# Patient Record
Sex: FEMALE | Race: WHITE | Employment: OTHER | URBAN - METROPOLITAN AREA
[De-identification: names, ages, dates, MRNs, and addresses within clinical notes are randomized per-mention and may not be internally consistent; named-entity substitution may affect disease eponyms.]

---

## 2019-10-15 ENCOUNTER — HOSPITAL ENCOUNTER (OUTPATIENT)
Dept: GENERAL RADIOLOGY | Age: 49
Discharge: HOME | End: 2019-10-15
Payer: COMMERCIAL

## 2019-10-15 ENCOUNTER — HOSPITAL ENCOUNTER (OUTPATIENT)
Age: 49
Discharge: HOME | End: 2019-10-15
Payer: COMMERCIAL

## 2019-10-15 DIAGNOSIS — S62.308A: ICD-10-CM

## 2019-10-15 DIAGNOSIS — J01.80 OTHER ACUTE SINUSITIS, RECURRENCE NOT SPECIFIED: ICD-10-CM

## 2019-10-15 DIAGNOSIS — S62.306P: ICD-10-CM

## 2019-10-15 DIAGNOSIS — J40 BRONCHITIS: ICD-10-CM

## 2019-10-15 PROCEDURE — 70220 X-RAY EXAM OF SINUSES: CPT

## 2019-10-15 PROCEDURE — 73130 X-RAY EXAM OF HAND: CPT

## 2019-10-15 PROCEDURE — 71046 X-RAY EXAM CHEST 2 VIEWS: CPT

## 2021-10-15 LAB — MAMMOGRAPHY, EXTERNAL: NORMAL

## 2022-07-21 ENCOUNTER — OFFICE VISIT (OUTPATIENT)
Dept: FAMILY MEDICINE CLINIC | Age: 52
End: 2022-07-21
Payer: OTHER GOVERNMENT

## 2022-07-21 VITALS
TEMPERATURE: 97.6 F | BODY MASS INDEX: 25.61 KG/M2 | HEART RATE: 74 BPM | HEIGHT: 68 IN | DIASTOLIC BLOOD PRESSURE: 82 MMHG | OXYGEN SATURATION: 96 % | WEIGHT: 169 LBS | SYSTOLIC BLOOD PRESSURE: 124 MMHG

## 2022-07-21 DIAGNOSIS — R12 HEARTBURN: ICD-10-CM

## 2022-07-21 DIAGNOSIS — F41.9 ANXIETY: Primary | ICD-10-CM

## 2022-07-21 DIAGNOSIS — G47.30 SLEEP APNEA, UNSPECIFIED TYPE: ICD-10-CM

## 2022-07-21 DIAGNOSIS — E78.5 HYPERLIPIDEMIA, UNSPECIFIED HYPERLIPIDEMIA TYPE: ICD-10-CM

## 2022-07-21 DIAGNOSIS — I10 HYPERTENSION, UNSPECIFIED TYPE: ICD-10-CM

## 2022-07-21 PROCEDURE — 99203 OFFICE O/P NEW LOW 30 MIN: CPT | Performed by: FAMILY MEDICINE

## 2022-07-21 RX ORDER — OMEPRAZOLE 20 MG/1
20 CAPSULE, DELAYED RELEASE ORAL DAILY
COMMUNITY
End: 2022-07-21 | Stop reason: SDUPTHER

## 2022-07-21 RX ORDER — OMEPRAZOLE 20 MG/1
40 CAPSULE, DELAYED RELEASE ORAL
COMMUNITY
Start: 2021-10-15

## 2022-07-21 RX ORDER — BUSPIRONE HYDROCHLORIDE 10 MG/1
10 TABLET ORAL
COMMUNITY
Start: 2022-07-12

## 2022-07-21 RX ORDER — HYDROCHLOROTHIAZIDE 12.5 MG/1
12.5 TABLET ORAL DAILY
COMMUNITY
End: 2022-07-21 | Stop reason: SDUPTHER

## 2022-07-21 RX ORDER — ATORVASTATIN CALCIUM 10 MG/1
10 TABLET, FILM COATED ORAL DAILY
COMMUNITY

## 2022-07-21 RX ORDER — BUSPIRONE HYDROCHLORIDE 5 MG/1
5 TABLET ORAL DAILY
COMMUNITY
End: 2022-07-21 | Stop reason: SDUPTHER

## 2022-07-21 RX ORDER — TELMISARTAN 40 MG/1
40 TABLET ORAL DAILY
COMMUNITY

## 2022-07-21 RX ORDER — HYDROCHLOROTHIAZIDE 12.5 MG/1
12.5 TABLET ORAL
COMMUNITY
Start: 2022-02-10

## 2022-07-21 SDOH — ECONOMIC STABILITY: FOOD INSECURITY: WITHIN THE PAST 12 MONTHS, THE FOOD YOU BOUGHT JUST DIDN'T LAST AND YOU DIDN'T HAVE MONEY TO GET MORE.: NEVER TRUE

## 2022-07-21 SDOH — ECONOMIC STABILITY: FOOD INSECURITY: WITHIN THE PAST 12 MONTHS, YOU WORRIED THAT YOUR FOOD WOULD RUN OUT BEFORE YOU GOT MONEY TO BUY MORE.: NEVER TRUE

## 2022-07-21 ASSESSMENT — PATIENT HEALTH QUESTIONNAIRE - PHQ9
1. LITTLE INTEREST OR PLEASURE IN DOING THINGS: 0
SUM OF ALL RESPONSES TO PHQ QUESTIONS 1-9: 0
2. FEELING DOWN, DEPRESSED OR HOPELESS: 0
SUM OF ALL RESPONSES TO PHQ QUESTIONS 1-9: 0
SUM OF ALL RESPONSES TO PHQ9 QUESTIONS 1 & 2: 0
SUM OF ALL RESPONSES TO PHQ QUESTIONS 1-9: 0
1. LITTLE INTEREST OR PLEASURE IN DOING THINGS: 0
SUM OF ALL RESPONSES TO PHQ QUESTIONS 1-9: 0

## 2022-07-21 ASSESSMENT — SOCIAL DETERMINANTS OF HEALTH (SDOH): HOW HARD IS IT FOR YOU TO PAY FOR THE VERY BASICS LIKE FOOD, HOUSING, MEDICAL CARE, AND HEATING?: NOT HARD AT ALL

## 2022-07-21 NOTE — PROGRESS NOTES
Chief Complaint   Patient presents with    New Patient     Does go to [place] but would like to have another doctor. HPI:  Brock López is a 46 y.o. female     Establish PCP outside of [place] system    Only on a few meds    Had bloodwork in April   Scheduled to go back in September     Just started buspar this past week     Reports she is feeling well without complaints    Patient Active Problem List   Diagnosis    Anxiety    Heartburn    Hyperlipidemia    Hypertension    Sleep apnea         Current Outpatient Medications   Medication Sig Dispense Refill    telmisartan (MICARDIS) 40 MG tablet Take 40 mg by mouth in the morning. atorvastatin (LIPITOR) 10 MG tablet Take 10 mg by mouth in the morning. busPIRone (BUSPAR) 10 MG tablet Take 10 mg by mouth      hydroCHLOROthiazide (HYDRODIURIL) 12.5 MG tablet Take 12.5 mg by mouth      omeprazole (PRILOSEC) 20 MG delayed release capsule Take 40 mg by mouth       No current facility-administered medications for this visit. Past Medical History:   Diagnosis Date    Anxiety     Heartburn     Hyperlipidemia     Hypertension     Sleep apnea      Past Surgical History:   Procedure Laterality Date    HERNIA REPAIR Right     TOTAL VAGINAL HYSTERECTOMY  12/09/2007     Family History   Problem Relation Age of Onset    Hypertension Mother     Other Father     Cancer Father     Diabetes Father     Cancer Maternal Grandmother     Cancer Paternal Grandmother      Social History     Socioeconomic History    Marital status: Single     Spouse name: None    Number of children: None    Years of education: None    Highest education level: None   Tobacco Use    Smoking status: Every Day     Packs/day: 0.50     Types: Cigarettes     Start date: 24    Smokeless tobacco: Never   Substance and Sexual Activity    Alcohol use:  Yes     Alcohol/week: 4.0 standard drinks     Types: 4 Shots of liquor per week    Drug use: Never     Social Determinants of Health     Financial Resource Strain: Low Risk     Difficulty of Paying Living Expenses: Not hard at all   Food Insecurity: No Food Insecurity    Worried About Running Out of Food in the Last Year: Never true    Ran Out of Food in the Last Year: Never true     No Known Allergies    Review of Systems:   General ROS: negative for - chills, fatigue, fever, malaise, weight gain or weight loss  Respiratory ROS: no cough, shortness of breath, or wheezing  Cardiovascular ROS: no chest pain or dyspnea on exertion  Gastrointestinal ROS: no abdominal pain, change in bowel habits, or black or bloody stools  Genito-Urinary ROS: no dysuria, trouble voiding  Musculoskeletal ROS: back pain/hip pain   Neurological ROS: negative for - behavioral changes, memory loss, numbness/tingling, tremors or weakness    In general patient otherwise reports feeling well. Physical Exam:  /82 (Site: Left Upper Arm, Position: Sitting, Cuff Size: Medium Adult)   Pulse 74   Temp 97.6 °F (36.4 °C) (Tympanic)   Ht 5' 8\" (1.727 m)   Wt 169 lb (76.7 kg)   LMP 12/09/2007 (Exact Date)   SpO2 96%   Breastfeeding No   BMI 25.70 kg/m²     Gen: Well, NAD, Alert, Oriented x 3   HEENT: EOMI, eyes clear, MMM  Skin: without rash or jaundice  Neck: no significant lymphadenopathy or thyromegaly  Lungs: CTA B w/out Rales/Wheezes/Rhonchi, Good respiratory effort   Heart: RRR, S1S2, w/out M/R/G, non-displaced PMI   Ext: No C/C/E Bilaterally. Neuro: Neurovascularly intact w/ Sensory/Motor intact UE/LE Bilaterally. No results found for: WBC, HGB, HCT, PLT, CHOL, TRIG, HDL, LDLDIRECT, ALT, AST, NA, K, CL, CREATININE, BUN, CO2, TSH, PSA, INR, GLUF, LABA1C, LABMICR      A&P   Diagnosis Orders   1. Anxiety        2. Heartburn        3. Hyperlipidemia, unspecified hyperlipidemia type        4. Hypertension, unspecified type        5. Sleep apnea, unspecified type            Healthy diet and exercise  Reports bad hips and bad back     Labs through [facility] through [employer].

## 2022-08-17 ENCOUNTER — HOSPITAL ENCOUNTER (EMERGENCY)
Age: 52
Discharge: HOME | End: 2022-08-17
Attending: EMERGENCY MEDICINE
Payer: OTHER GOVERNMENT

## 2022-08-17 ENCOUNTER — APPOINTMENT (OUTPATIENT)
Dept: CT IMAGING | Age: 52
End: 2022-08-17
Payer: OTHER GOVERNMENT

## 2022-08-17 VITALS
WEIGHT: 172 LBS | HEIGHT: 68 IN | HEART RATE: 67 BPM | BODY MASS INDEX: 26.07 KG/M2 | SYSTOLIC BLOOD PRESSURE: 157 MMHG | TEMPERATURE: 98.5 F | RESPIRATION RATE: 18 BRPM | OXYGEN SATURATION: 96 % | DIASTOLIC BLOOD PRESSURE: 99 MMHG

## 2022-08-17 DIAGNOSIS — R11.2 NON-INTRACTABLE VOMITING WITH NAUSEA, UNSPECIFIED VOMITING TYPE: Primary | ICD-10-CM

## 2022-08-17 LAB
ALBUMIN SERPL-MCNC: 4.7 G/DL (ref 3.5–4.6)
ALP BLD-CCNC: 110 U/L (ref 40–130)
ALT SERPL-CCNC: 131 U/L (ref 0–33)
ANION GAP SERPL CALCULATED.3IONS-SCNC: 27 MEQ/L (ref 9–15)
AST SERPL-CCNC: 167 U/L (ref 0–35)
BASOPHILS ABSOLUTE: 0 K/UL (ref 0–0.2)
BASOPHILS RELATIVE PERCENT: 1.1 %
BILIRUB SERPL-MCNC: 2.3 MG/DL (ref 0.2–0.7)
BUN BLDV-MCNC: 8 MG/DL (ref 6–20)
CALCIUM SERPL-MCNC: 9.8 MG/DL (ref 8.5–9.9)
CHLORIDE BLD-SCNC: 87 MEQ/L (ref 95–107)
CO2: 21 MEQ/L (ref 20–31)
CREAT SERPL-MCNC: 0.56 MG/DL (ref 0.5–0.9)
EOSINOPHILS ABSOLUTE: 0 K/UL (ref 0–0.7)
EOSINOPHILS RELATIVE PERCENT: 0.1 %
GFR AFRICAN AMERICAN: >60
GFR NON-AFRICAN AMERICAN: >60
GLOBULIN: 2.5 G/DL (ref 2.3–3.5)
GLUCOSE BLD-MCNC: 102 MG/DL (ref 70–99)
HCT VFR BLD CALC: 41.2 % (ref 37–47)
HEMOGLOBIN: 14.4 G/DL (ref 12–16)
LYMPHOCYTES ABSOLUTE: 1.1 K/UL (ref 1–4.8)
LYMPHOCYTES RELATIVE PERCENT: 24.5 %
MCH RBC QN AUTO: 34.9 PG (ref 27–31.3)
MCHC RBC AUTO-ENTMCNC: 34.8 % (ref 33–37)
MCV RBC AUTO: 100.1 FL (ref 82–100)
MONOCYTES ABSOLUTE: 0.5 K/UL (ref 0.2–0.8)
MONOCYTES RELATIVE PERCENT: 11.2 %
NEUTROPHILS ABSOLUTE: 2.8 K/UL (ref 1.4–6.5)
NEUTROPHILS RELATIVE PERCENT: 63.1 %
PDW BLD-RTO: 13.9 % (ref 11.5–14.5)
PLATELET # BLD: 157 K/UL (ref 130–400)
POTASSIUM SERPL-SCNC: 3.9 MEQ/L (ref 3.4–4.9)
RBC # BLD: 4.12 M/UL (ref 4.2–5.4)
SODIUM BLD-SCNC: 135 MEQ/L (ref 135–144)
TOTAL PROTEIN: 7.2 G/DL (ref 6.3–8)
WBC # BLD: 4.5 K/UL (ref 4.8–10.8)

## 2022-08-17 PROCEDURE — 99285 EMERGENCY DEPT VISIT HI MDM: CPT

## 2022-08-17 PROCEDURE — 36415 COLL VENOUS BLD VENIPUNCTURE: CPT

## 2022-08-17 PROCEDURE — 96374 THER/PROPH/DIAG INJ IV PUSH: CPT

## 2022-08-17 PROCEDURE — 2580000003 HC RX 258: Performed by: EMERGENCY MEDICINE

## 2022-08-17 PROCEDURE — 6360000004 HC RX CONTRAST MEDICATION: Performed by: EMERGENCY MEDICINE

## 2022-08-17 PROCEDURE — 85025 COMPLETE CBC W/AUTO DIFF WBC: CPT

## 2022-08-17 PROCEDURE — 96375 TX/PRO/DX INJ NEW DRUG ADDON: CPT

## 2022-08-17 PROCEDURE — 6370000000 HC RX 637 (ALT 250 FOR IP): Performed by: EMERGENCY MEDICINE

## 2022-08-17 PROCEDURE — 74177 CT ABD & PELVIS W/CONTRAST: CPT

## 2022-08-17 PROCEDURE — 80053 COMPREHEN METABOLIC PANEL: CPT

## 2022-08-17 PROCEDURE — 96376 TX/PRO/DX INJ SAME DRUG ADON: CPT

## 2022-08-17 PROCEDURE — 6360000002 HC RX W HCPCS: Performed by: EMERGENCY MEDICINE

## 2022-08-17 RX ORDER — ONDANSETRON 2 MG/ML
4 INJECTION INTRAMUSCULAR; INTRAVENOUS ONCE
Status: COMPLETED | OUTPATIENT
Start: 2022-08-17 | End: 2022-08-17

## 2022-08-17 RX ORDER — ONDANSETRON 4 MG/1
4 TABLET, ORALLY DISINTEGRATING ORAL EVERY 8 HOURS PRN
Qty: 15 TABLET | Refills: 0 | Status: SHIPPED | OUTPATIENT
Start: 2022-08-17

## 2022-08-17 RX ORDER — ONDANSETRON 4 MG/1
4 TABLET, ORALLY DISINTEGRATING ORAL ONCE
Status: COMPLETED | OUTPATIENT
Start: 2022-08-17 | End: 2022-08-17

## 2022-08-17 RX ORDER — DIAZEPAM 5 MG/ML
5 INJECTION, SOLUTION INTRAMUSCULAR; INTRAVENOUS ONCE
Status: COMPLETED | OUTPATIENT
Start: 2022-08-17 | End: 2022-08-17

## 2022-08-17 RX ORDER — 0.9 % SODIUM CHLORIDE 0.9 %
1000 INTRAVENOUS SOLUTION INTRAVENOUS ONCE
Status: COMPLETED | OUTPATIENT
Start: 2022-08-17 | End: 2022-08-17

## 2022-08-17 RX ORDER — HYDROXYZINE 50 MG/1
50 TABLET, FILM COATED ORAL 2 TIMES DAILY PRN
Qty: 30 TABLET | Refills: 1 | Status: SHIPPED | OUTPATIENT
Start: 2022-08-17 | End: 2022-09-01

## 2022-08-17 RX ADMIN — IOPAMIDOL 50 ML: 612 INJECTION, SOLUTION INTRAVENOUS at 20:38

## 2022-08-17 RX ADMIN — DIAZEPAM 5 MG: 5 INJECTION, SOLUTION INTRAMUSCULAR; INTRAVENOUS at 19:09

## 2022-08-17 RX ADMIN — SODIUM CHLORIDE 1000 ML: 9 INJECTION, SOLUTION INTRAVENOUS at 19:08

## 2022-08-17 RX ADMIN — ONDANSETRON 4 MG: 2 INJECTION INTRAMUSCULAR; INTRAVENOUS at 19:09

## 2022-08-17 RX ADMIN — ONDANSETRON 4 MG: 2 INJECTION INTRAMUSCULAR; INTRAVENOUS at 21:23

## 2022-08-17 RX ADMIN — ONDANSETRON 4 MG: 4 TABLET, ORALLY DISINTEGRATING ORAL at 21:23

## 2022-08-17 ASSESSMENT — ENCOUNTER SYMPTOMS
EYE DISCHARGE: 0
WHEEZING: 0
ABDOMINAL DISTENTION: 0
SORE THROAT: 0
VOMITING: 1
CHEST TIGHTNESS: 0
ABDOMINAL PAIN: 0
NAUSEA: 1
PHOTOPHOBIA: 0
COUGH: 0
SHORTNESS OF BREATH: 0

## 2022-08-17 ASSESSMENT — PAIN - FUNCTIONAL ASSESSMENT
PAIN_FUNCTIONAL_ASSESSMENT: NONE - DENIES PAIN

## 2022-08-17 NOTE — ED PROVIDER NOTES
[facility] ED  eMERGENCY dEPARTMENT eNCOUnter      Pt Name: Jerrod Powers  MRN: [id number]  [place] 1970  Date of evaluation: 8/17/2022  Provider: Bo Alas MD    CHIEF COMPLAINT       Chief Complaint   Patient presents with    Nausea     Times 3 weeks         HISTORY OF PRESENT ILLNESS   (Location/Symptom, Timing/Onset,Context/Setting, Quality, Duration, Modifying Factors, Severity)  Note limiting factors. Jerrod Powers is a 46 y.o. female who presents to the emergency department for evaluation of nausea. Patient's had persistent nausea with vomiting for the past 3 weeks. She states its become more severe in the last 3 days where she feels like she cannot even keep anything down. No related abdominal pain. No diarrhea. No chest pain or shortness of breath. She relates the symptoms to her anxiety medication. Initially on Zoloft and then Wellbutrin and then now more recently BuSpar. She feels like she tolerates the medication for a couple weeks and then it begins to cause nausea. He has not taken her medication last 3 days because the vomiting is been severe. No real change since discontinuing her medication. HPI    NursingNotes were reviewed. REVIEW OF SYSTEMS    (2-9 systems for level 4, 10 or more for level 5)     Review of Systems   Constitutional:  Negative for chills and diaphoresis. HENT:  Negative for congestion, ear pain, mouth sores and sore throat. Eyes:  Negative for photophobia and discharge. Respiratory:  Negative for cough, chest tightness, shortness of breath and wheezing. Cardiovascular:  Negative for chest pain and palpitations. Gastrointestinal:  Positive for nausea and vomiting. Negative for abdominal distention and abdominal pain. Endocrine: Negative for cold intolerance. Genitourinary:  Negative for difficulty urinating. Musculoskeletal:  Negative for arthralgias. Skin:  Negative for pallor and rash.    Allergic/Immunologic: Negative for immunocompromised state. Neurological:  Negative for dizziness and syncope. Hematological:  Negative for adenopathy. Psychiatric/Behavioral:  Negative for agitation and hallucinations. All other systems reviewed and are negative. Except as noted above the remainder of the review of systems was reviewed and negative. PAST MEDICAL HISTORY     Past Medical History:   Diagnosis Date    Anxiety     Heartburn     Hyperlipidemia     Hypertension     Sleep apnea          SURGICALHISTORY       Past Surgical History:   Procedure Laterality Date    HERNIA REPAIR Right     TOTAL VAGINAL HYSTERECTOMY  12/09/2007         CURRENT MEDICATIONS       Previous Medications    ATORVASTATIN (LIPITOR) 10 MG TABLET    Take 10 mg by mouth in the morning. BUSPIRONE (BUSPAR) 10 MG TABLET    Take 10 mg by mouth    HYDROCHLOROTHIAZIDE (HYDRODIURIL) 12.5 MG TABLET    Take 12.5 mg by mouth    OMEPRAZOLE (PRILOSEC) 20 MG DELAYED RELEASE CAPSULE    Take 40 mg by mouth    TELMISARTAN (MICARDIS) 40 MG TABLET    Take 40 mg by mouth in the morning. ALLERGIES     Patient has no known allergies. FAMILY HISTORY       Family History   Problem Relation Age of Onset    Hypertension Mother     Other Father     Cancer Father     Diabetes Father     Cancer Maternal Grandmother     Cancer Paternal Grandmother           SOCIAL HISTORY       Social History     Socioeconomic History    Marital status: Single     Spouse name: None    Number of children: None    Years of education: None    Highest education level: None   Tobacco Use    Smoking status: Every Day     Packs/day: 0.50     Types: Cigarettes     Start date: 24    Smokeless tobacco: Never   Substance and Sexual Activity    Alcohol use:  Yes     Alcohol/week: 4.0 standard drinks     Types: 4 Shots of liquor per week    Drug use: Never     Social Determinants of Health     Financial Resource Strain: Low Risk     Difficulty of Paying Living Expenses: Not hard at all   Food Insecurity: No Food Insecurity    Worried About Running Out of Food in the Last Year: Never true    Ran Out of Food in the Last Year: Never true       SCREENINGS    [place] Coma Scale  Eye Opening: Spontaneous  Best Verbal Response: Oriented  Best Motor Response: Obeys commands  [place] Coma Scale Score: 15 @FLOW([id number])@      PHYSICAL EXAM    (up to 7 for level 4, 8 or more for level 5)     ED Triage Vitals [08/17/22 1811]   BP Temp Temp Source Heart Rate Resp SpO2 Height Weight   (!) 130/94 98.5 °F (36.9 °C) Oral 97 18 96 % 5' 8\" (1.727 m) 172 lb (78 kg)       Physical Exam  Vitals and nursing note reviewed. Constitutional:       Appearance: She is well-developed. HENT:      Head: Normocephalic. Nose: Nose normal.   Eyes:      Conjunctiva/sclera: Conjunctivae normal.      Pupils: Pupils are equal, round, and reactive to light. Cardiovascular:      Rate and Rhythm: Normal rate and regular rhythm. Heart sounds: Normal heart sounds. Pulmonary:      Effort: Pulmonary effort is normal.      Breath sounds: Normal breath sounds. Abdominal:      General: Bowel sounds are normal.      Palpations: Abdomen is soft. Tenderness: There is no abdominal tenderness. There is no guarding. Musculoskeletal:         General: Normal range of motion. Cervical back: Normal range of motion and neck supple. Skin:     General: Skin is warm and dry. Capillary Refill: Capillary refill takes less than 2 seconds. Neurological:      Mental Status: She is alert and oriented to person, place, and time.    Psychiatric:         Mood and Affect: Mood normal.       DIAGNOSTIC RESULTS     EKG: All EKG's are interpreted by the Emergency Department Physician who either signs or Co-signsthis chart in the absence of a cardiologist.        RADIOLOGY:   Non-plain filmimages such as CT, Ultrasound and MRI are read by the radiologist. Plain radiographic images are visualized and preliminarily interpreted by the emergency physician with the below findings:    Interpretation per the Radiologist below, if available at the time ofthis note:    CT ABDOMEN PELVIS W IV CONTRAST Additional Contrast? None    (Results Pending)         ED BEDSIDE ULTRASOUND:   Performed by ED Physician - none    LABS:  Labs Reviewed   COMPREHENSIVE METABOLIC PANEL - Abnormal; Notable for the following components:       Result Value    Chloride 87 (*)     Anion Gap 27 (*)     Glucose 102 (*)     Albumin 4.7 (*)     Total Bilirubin 2.3 (*)      (*)      (*)     All other components within normal limits   CBC WITH AUTO DIFFERENTIAL - Abnormal; Notable for the following components:    WBC 4.5 (*)     RBC 4.12 (*)     .1 (*)     MCH 34.9 (*)     All other components within normal limits       All other labs were within normal range or not returned as of this dictation. EMERGENCY DEPARTMENT COURSE and DIFFERENTIAL DIAGNOSIS/MDM:   Vitals:    Vitals:    08/17/22 1811 08/17/22 2045   BP: (!) 130/94 (!) 157/99   Pulse: 97 67   Resp: 18 18   Temp: 98.5 °F (36.9 °C)    TempSrc: Oral    SpO2: 96% 96%   Weight: 172 lb (78 kg)    Height: 5' 8\" (1.727 m)           MDM  On recheck patient's nausea is mostly improved. She did have elevated liver functions and bilirubin so CT was performed. ET shows no signs of gallbladder or pancreas disease. She does have an adrenal gland cyst that was actually evaluated by MRI in the past few months. Considered benign. Already discontinued the BuSpar. I Jose Comes put her on Atarax as needed till she follows up with her primary care physician. CONSULTS:  None    PROCEDURES:  Unless otherwise noted below, none     Procedures    FINAL IMPRESSION      1.  Non-intractable vomiting with nausea, unspecified vomiting type          DISPOSITION/PLAN   DISPOSITION Discharge - Pending Orders Complete 08/17/2022 09:18:57 PM      PATIENT REFERRED TO:  MD [facility], [address] [place] [address], [phone]  [phone]          DISCHARGE MEDICATIONS:  New Prescriptions    HYDROXYZINE HCL (ATARAX) 50 MG TABLET    Take 1 tablet by mouth 2 times daily as needed for Itching or Anxiety May sub Vistaril.     ONDANSETRON (ZOFRAN ODT) 4 MG DISINTEGRATING TABLET    Take 1 tablet by mouth every 8 hours as needed for Nausea          (Please note that portions of this note were completed with a voice recognition program.  Efforts were made to edit the dictations but occasionally words are mis-transcribed.)    Kalen Cardenas MD (electronically signed)  Attending Emergency Physician          Kalen Cardenas MD  08/17/22 4841

## 2022-08-17 NOTE — ED TRIAGE NOTES
Pt states that she has N/V off and on for 3 weeks.    Pt thought maybe due to buspar that she was taking and has since stopped taking  Pt states that she has no pain   Pt denies any HA   Pt denies any chest pain  Pt states that \"any time I take anything for anxiety it makes it happen\"   Pt has been unable to eat or drink anything for the last 3 days  Pt is alert and oriented times 4  Pt states has been having small bowel movements  Pt states that has been urinating

## 2022-10-21 ENCOUNTER — OFFICE VISIT (OUTPATIENT)
Dept: FAMILY MEDICINE CLINIC | Age: 52
End: 2022-10-21
Payer: OTHER GOVERNMENT

## 2022-10-21 VITALS
HEIGHT: 68 IN | HEART RATE: 89 BPM | OXYGEN SATURATION: 95 % | BODY MASS INDEX: 25.31 KG/M2 | SYSTOLIC BLOOD PRESSURE: 142 MMHG | DIASTOLIC BLOOD PRESSURE: 90 MMHG | WEIGHT: 167 LBS

## 2022-10-21 DIAGNOSIS — D49.0 IPMN (INTRADUCTAL PAPILLARY MUCINOUS NEOPLASM): ICD-10-CM

## 2022-10-21 DIAGNOSIS — R63.4 UNINTENTIONAL WEIGHT LOSS: ICD-10-CM

## 2022-10-21 DIAGNOSIS — K76.0 HEPATIC STEATOSIS: ICD-10-CM

## 2022-10-21 DIAGNOSIS — R11.2 NAUSEA AND VOMITING, UNSPECIFIED VOMITING TYPE: Primary | ICD-10-CM

## 2022-10-21 PROCEDURE — 99213 OFFICE O/P EST LOW 20 MIN: CPT | Performed by: NURSE PRACTITIONER

## 2022-10-21 ASSESSMENT — ENCOUNTER SYMPTOMS
TROUBLE SWALLOWING: 0
NAUSEA: 1
CHEST TIGHTNESS: 0
COUGH: 0
EYE PAIN: 0
COLOR CHANGE: 0
SHORTNESS OF BREATH: 0
CONSTIPATION: 0
ABDOMINAL PAIN: 0
DIARRHEA: 0
VOMITING: 1
BACK PAIN: 0

## 2022-10-21 NOTE — PROGRESS NOTES
Subjective  Chief Complaint   Patient presents with    Nausea & Vomiting     Since the end of Jan 2022, 20 lbs down without trying, pt thought it was related to her anxiety medication but now knows it is not because she stopped taking it. HPI    Pt here to discuss symptoms as above. Started in January with significant nausea and vomiting. Thought it was related to anxiety medication, stopped the medication. Started a new one 2 more times and had same symptoms. Initially symptoms improved after stopping medications; however this time after stopping medication in July the symptoms have not subsided. [facility] Did have MRI of the abdomen in July which showed Adrenal Nodule, fatty liver and and possible ipmn of the pancreas. Has lost approx 35 pounds in 3 1/2 years without trying. Has had labs which were all normal other than elevated liver enzymes. Past Medical History:   Diagnosis Date    Anxiety     Heartburn     Hyperlipidemia     Hypertension     Sleep apnea      Patient Active Problem List    Diagnosis Date Noted    Anxiety 07/21/2022    Heartburn 07/21/2022    Hyperlipidemia 07/21/2022    Hypertension 07/21/2022    Sleep apnea 07/21/2022     Past Surgical History:   Procedure Laterality Date    HERNIA REPAIR Right     TOTAL VAGINAL HYSTERECTOMY  12/09/2007     Family History   Problem Relation Age of Onset    Hypertension Mother     Other Father     Cancer Father     Diabetes Father     Cancer Maternal Grandmother     Cancer Paternal Grandmother      Social History     Socioeconomic History    Marital status: Single     Spouse name: None    Number of children: None    Years of education: None    Highest education level: None   Tobacco Use    Smoking status: Every Day     Packs/day: 0.50     Years: 35.00     Pack years: 17.50     Types: Cigarettes     Start date: 1987    Smokeless tobacco: Never   Substance and Sexual Activity    Alcohol use:  Yes     Alcohol/week: 4.0 standard drinks     Types: 4 Shots of liquor per week    Drug use: Never     Social Determinants of Health     Financial Resource Strain: Low Risk     Difficulty of Paying Living Expenses: Not hard at all   Food Insecurity: No Food Insecurity    Worried About Running Out of Food in the Last Year: Never true    Ran Out of Food in the Last Year: Never true     Current Outpatient Medications on File Prior to Visit   Medication Sig Dispense Refill    ondansetron (ZOFRAN ODT) 4 MG disintegrating tablet Take 1 tablet by mouth every 8 hours as needed for Nausea 15 tablet 0    telmisartan (MICARDIS) 40 MG tablet Take 40 mg by mouth in the morning. atorvastatin (LIPITOR) 10 MG tablet Take 10 mg by mouth in the morning. hydroCHLOROthiazide (HYDRODIURIL) 12.5 MG tablet Take 12.5 mg by mouth      omeprazole (PRILOSEC) 20 MG delayed release capsule Take 40 mg by mouth      busPIRone (BUSPAR) 10 MG tablet Take 10 mg by mouth (Patient not taking: Reported on 10/21/2022)       No current facility-administered medications on file prior to visit. No Known Allergies    Review of Systems   Constitutional:  Positive for unexpected weight change. Negative for activity change, appetite change, chills, diaphoresis, fatigue and fever. HENT:  Negative for congestion, ear pain, hearing loss and trouble swallowing. Eyes:  Negative for pain and visual disturbance. Respiratory:  Negative for cough, chest tightness and shortness of breath. Cardiovascular:  Negative for chest pain, palpitations and leg swelling. Gastrointestinal:  Positive for nausea and vomiting. Negative for abdominal pain, constipation and diarrhea. Endocrine: Negative for polydipsia, polyphagia and polyuria. Genitourinary:  Negative for difficulty urinating and dysuria. Musculoskeletal:  Negative for arthralgias and back pain. Skin:  Negative for color change and rash. Neurological:  Negative for dizziness and light-headedness. Psychiatric/Behavioral:  Negative for dysphoric mood. The patient is not nervous/anxious. Objective  Vitals:    10/21/22 1401 10/21/22 1407   BP: (!) 148/92 (!) 142/90   Site: Right Upper Arm    Position: Sitting    Cuff Size: Medium Adult    Pulse: 89    SpO2: 95%    Weight: 167 lb (75.8 kg)    Height: 5' 8\" (1.727 m)      Physical Exam  Constitutional:       General: She is not in acute distress. Appearance: Normal appearance. She is normal weight. She is not ill-appearing, toxic-appearing or diaphoretic. HENT:      Head: Normocephalic and atraumatic. Right Ear: External ear normal.      Left Ear: External ear normal.      Nose: Nose normal. No congestion or rhinorrhea. Eyes:      Extraocular Movements: Extraocular movements intact. Conjunctiva/sclera: Conjunctivae normal.      Pupils: Pupils are equal, round, and reactive to light. Cardiovascular:      Rate and Rhythm: Normal rate and regular rhythm. Pulses: Normal pulses. Heart sounds: Normal heart sounds. No murmur heard. Pulmonary:      Effort: Pulmonary effort is normal. No respiratory distress. Breath sounds: Normal breath sounds. No stridor. No wheezing, rhonchi or rales. Chest:      Chest wall: No tenderness. Musculoskeletal:         General: Normal range of motion. Cervical back: Normal range of motion and neck supple. No tenderness. Right lower leg: No edema. Left lower leg: No edema. Lymphadenopathy:      Cervical: No cervical adenopathy. Skin:     General: Skin is warm. Capillary Refill: Capillary refill takes less than 2 seconds. Coloration: Skin is not jaundiced. Findings: No erythema or lesion. Neurological:      General: No focal deficit present. Mental Status: She is alert and oriented to person, place, and time. Mental status is at baseline. Cranial Nerves: No cranial nerve deficit.       Coordination: Coordination normal.      Gait: Gait normal. Psychiatric:         Mood and Affect: Mood normal.         Behavior: Behavior normal.         Thought Content: Thought content normal.         Judgment: Judgment normal.       Assessment& Plan     Diagnosis Orders   1. Nausea and vomiting, unspecified vomiting type  [facility]      2. Unintentional weight loss  [facility]      3. Hepatic steatosis  [facility]      4. IPMN (intraductal papillary mucinous neoplasm)  [facility]        Referral to GI. Side effects, adverse effects of the medication prescribed today, as well as treatment plan/ rationale and result expectations have been discussed with the patient who expresses understanding and desires to proceed. Close follow up to evaluate treatment results and for coordination of care. I have reviewed the patient's medical history in detail and updated the computerized patient record. As always, patient is advised that if symptoms worsen in any way they will proceed to the nearest emergency room. Orders Placed This Encounter   Procedures    [facility]     Referral Priority:   Routine     Referral Type:   Eval and Treat     Referral Reason:   Specialty Services Required     Requested Specialty:   Gastroenterology     Number of Visits Requested:   1       No orders of the defined types were placed in this encounter. There are no discontinued medications. Return if symptoms worsen or fail to improve.     Amanda Torres, APRN - CNP

## 2022-11-15 ENCOUNTER — OFFICE VISIT (OUTPATIENT)
Dept: GASTROENTEROLOGY | Age: 52
End: 2022-11-15
Payer: OTHER GOVERNMENT

## 2022-11-15 VITALS
HEIGHT: 68 IN | SYSTOLIC BLOOD PRESSURE: 124 MMHG | OXYGEN SATURATION: 99 % | WEIGHT: 161 LBS | HEART RATE: 68 BPM | DIASTOLIC BLOOD PRESSURE: 64 MMHG | BODY MASS INDEX: 24.4 KG/M2

## 2022-11-15 DIAGNOSIS — R63.0 LOSS OF APPETITE: ICD-10-CM

## 2022-11-15 DIAGNOSIS — K70.9 ALCOHOLIC LIVER DISEASE (HCC): ICD-10-CM

## 2022-11-15 DIAGNOSIS — R11.2 NAUSEA AND VOMITING, UNSPECIFIED VOMITING TYPE: Primary | ICD-10-CM

## 2022-11-15 DIAGNOSIS — K76.0 FATTY LIVER: ICD-10-CM

## 2022-11-15 DIAGNOSIS — R63.4 WEIGHT LOSS: ICD-10-CM

## 2022-11-15 PROCEDURE — 3078F DIAST BP <80 MM HG: CPT | Performed by: INTERNAL MEDICINE

## 2022-11-15 PROCEDURE — 99204 OFFICE O/P NEW MOD 45 MIN: CPT | Performed by: INTERNAL MEDICINE

## 2022-11-15 PROCEDURE — 3074F SYST BP LT 130 MM HG: CPT | Performed by: INTERNAL MEDICINE

## 2022-11-15 RX ORDER — SODIUM, POTASSIUM,MAG SULFATES 17.5-3.13G
SOLUTION, RECONSTITUTED, ORAL ORAL
Qty: 354 ML | Refills: 0 | Status: SHIPPED | OUTPATIENT
Start: 2022-11-15

## 2022-11-15 NOTE — PROGRESS NOTES
Gastroenterology Clinic Visit    Norah Mar  [id number]  Chief Complaint   Patient presents with    New Patient    Nausea     HPI: 45 y.o. female presents to the clinic with persistent nausea, occasional vomiting over the last 11 months. Patient reports symptoms started initially in third week of January, 3-1/2 weeks into new treatment with Zoloft. She discontinued Zoloft with some improvement in symptoms, restarted Lexapro in May, once again developed nausea and vomiting 3-1/2 weeks into the treatment, discontinued Lexapro and was tried on BuSpar in July 2022, once again she had nausea and vomiting 3-1/2 weeks into the treatment. She once again discontinued the BuSpar. She has not restarted any of the other antianxiety medication however her nausea and occasional vomiting symptoms persist.  She also has noticed reduced appetite, weight loss of about 20 pounds over the last year. Since she could not take any of the antianxiety medication patient has started using marijuana with some improvement in symptoms. She denies any abdominal pain or dysphagia. She denies any hematemesis, hematochezia or melena. She is a  and receives most of her care at the [facility]. She has history of gastroesophageal reflux, on Prilosec since 2007. S/p upper endoscopy prior to her penitentiary from the [facility] in 2019. According to patient this was noted to be normal.  On further review of her chart patient endorses ongoing smoking, alcohol use and marijuana use as noted above. She smokes about half a pack of cigarettes a day, drinks about 3 drinks (liquor) a day. She does endorse to having issues with excess alcohol intake in the past, needed rehabilitation. No family history of colon cancer, stomach cancer or esophageal cancer. Previous GI work up/Endoscopic investigations:   EGD: 2009:  At [facility]: Normal per patient  Colonoscopy: 5/15/2013: Report not available for review    Review of Systems   All other systems reviewed and are is well-developed. Eyes:      General: No scleral icterus. Cardiovascular:      Rate and Rhythm: Normal rate and regular rhythm. Pulmonary:      Effort: Pulmonary effort is normal.      Breath sounds: Normal breath sounds. Abdominal:      General: Bowel sounds are normal. There is no distension. Palpations: Abdomen is soft. There is no mass. Tenderness: There is no abdominal tenderness. There is no guarding or rebound. Musculoskeletal:         General: Normal range of motion. Lymphadenopathy:      Cervical: No cervical adenopathy. Neurological:      Mental Status: She is alert and oriented to person, place, and time. Psychiatric:         Behavior: Behavior normal.         Thought Content: Thought content normal.         Judgment: Judgment normal.     Laboratory, Pathology, Radiology reviewed indetail with relevant important investigations summarized below:  Lab Results   Component Value Date    WBC 4.5 (L) 08/17/2022    HGB 14.4 08/17/2022    HCT 41.2 08/17/2022    .1 (H) 08/17/2022     08/17/2022     No results found for: IRON, TIBC, FERRITIN  No results found for: Sabrina Riedel   No results found for: FOLATE  Lab Results   Component Value Date    LABALBU 4.7 (H) 08/17/2022      Lab Results   Component Value Date     (H) 08/17/2022     (H) 08/17/2022    ALKPHOS 110 08/17/2022    BILITOT 2.3 (H) 08/17/2022   Patient reports labs in September at [place] with AST ALT in the 90's    MRI: 10/21/2022: Diffuse hepatic steatosis, focal significant fatty infiltration, small right adrenal nodule, small cystic pancreatic lesion suggestive of IPMN    Assessment and Plan:  45 y.o. female presents to the GI clinic for further evaluation of persistent nausea and vomiting. Initially symptoms related to antianxiety medications (Zoloft, Lexapro and BuSpar). Patient developed consistent symptoms 3-1/2 weeks into treatment.   No significant improvement in symptoms despite stopping BuSpar a couple of months ago. Review of her labs, imaging shows evidence for significant fatty infiltration likely secondary to alcohol use, concern for alcoholic liver disease, no evidence for cirrhosis at this time. Alcohol-related gastritis in differential, certainly has element of alcoholic liver disease. 1. Nausea and vomiting, unspecified vomiting type  2. Loss of appetite  -Strongly recommend alcohol cessation  - Additionally recommend reduction/stopping  in smoking  -EGD to evaluate further  -Check LFTs at next GI clinic visit  -Continue PPI therapy    3. Weight loss  -Likely a symptom of alcoholic liver disease  - EGD and colonoscopy to exclude organic pathology in the GI tract  - Na Sulfate-K Sulfate-Mg Sulf (SUPREP) 17.5-3.13-1.6 GM/177ML SOLN solution; As directed  Dispense: 354 mL; Refill: 0    4. Fatty liver  5. Concern for Alcoholic liver disease ([address]  -Importance of alcohol cessation emphasized  -Monitor LFTs    Follow up in GI clinic in 2 weeks after endoscopic evaluation     Rodrigo Bradley MD   Staff Gastroenterologist  [facility]    Please note this report has been partially produced using speech recognition software and may cause or contain errors related to thatsystem including grammar, punctuation and spelling as well as words and phrases that may seem inappropriate. If there are questions or concerns please feel free to contact me to clarify.

## 2022-11-21 ENCOUNTER — TELEPHONE (OUTPATIENT)
Dept: GASTROENTEROLOGY | Age: 52
End: 2022-11-21

## 2023-01-12 ENCOUNTER — ANESTHESIA (OUTPATIENT)
Dept: ENDOSCOPY | Age: 53
End: 2023-01-12
Payer: OTHER GOVERNMENT

## 2023-01-12 ENCOUNTER — HOSPITAL ENCOUNTER (OUTPATIENT)
Age: 53
Setting detail: OUTPATIENT SURGERY
Discharge: HOME | End: 2023-01-12
Attending: INTERNAL MEDICINE | Admitting: INTERNAL MEDICINE
Payer: OTHER GOVERNMENT

## 2023-01-12 ENCOUNTER — ANESTHESIA EVENT (OUTPATIENT)
Dept: ENDOSCOPY | Age: 53
End: 2023-01-12
Payer: OTHER GOVERNMENT

## 2023-01-12 VITALS
DIASTOLIC BLOOD PRESSURE: 59 MMHG | SYSTOLIC BLOOD PRESSURE: 113 MMHG | BODY MASS INDEX: 24.55 KG/M2 | RESPIRATION RATE: 16 BRPM | TEMPERATURE: 97.6 F | WEIGHT: 162 LBS | OXYGEN SATURATION: 98 % | HEIGHT: 68 IN | HEART RATE: 73 BPM

## 2023-01-12 PROCEDURE — 6370000000 HC RX 637 (ALT 250 FOR IP): Performed by: INTERNAL MEDICINE

## 2023-01-12 PROCEDURE — 7100000010 HC PHASE II RECOVERY - FIRST 15 MIN: Performed by: INTERNAL MEDICINE

## 2023-01-12 PROCEDURE — 3700000001 HC ADD 15 MINUTES (ANESTHESIA): Performed by: INTERNAL MEDICINE

## 2023-01-12 PROCEDURE — 2580000003 HC RX 258: Performed by: INTERNAL MEDICINE

## 2023-01-12 PROCEDURE — 2580000003 HC RX 258

## 2023-01-12 PROCEDURE — 2500000003 HC RX 250 WO HCPCS: Performed by: NURSE ANESTHETIST, CERTIFIED REGISTERED

## 2023-01-12 PROCEDURE — 3609017100 HC EGD: Performed by: INTERNAL MEDICINE

## 2023-01-12 PROCEDURE — 3700000000 HC ANESTHESIA ATTENDED CARE: Performed by: INTERNAL MEDICINE

## 2023-01-12 PROCEDURE — 7100000011 HC PHASE II RECOVERY - ADDTL 15 MIN: Performed by: INTERNAL MEDICINE

## 2023-01-12 PROCEDURE — 3609027000 HC COLONOSCOPY: Performed by: INTERNAL MEDICINE

## 2023-01-12 PROCEDURE — 43235 EGD DIAGNOSTIC BRUSH WASH: CPT | Performed by: INTERNAL MEDICINE

## 2023-01-12 PROCEDURE — 2709999900 HC NON-CHARGEABLE SUPPLY: Performed by: INTERNAL MEDICINE

## 2023-01-12 PROCEDURE — 6360000002 HC RX W HCPCS: Performed by: NURSE ANESTHETIST, CERTIFIED REGISTERED

## 2023-01-12 PROCEDURE — 45378 DIAGNOSTIC COLONOSCOPY: CPT | Performed by: INTERNAL MEDICINE

## 2023-01-12 RX ORDER — SIMETHICONE 20 MG/.3ML
EMULSION ORAL PRN
Status: DISCONTINUED | OUTPATIENT
Start: 2023-01-12 | End: 2023-01-12 | Stop reason: ALTCHOICE

## 2023-01-12 RX ORDER — PROPOFOL 10 MG/ML
INJECTION, EMULSION INTRAVENOUS PRN
Status: DISCONTINUED | OUTPATIENT
Start: 2023-01-12 | End: 2023-01-12 | Stop reason: SDUPTHER

## 2023-01-12 RX ORDER — SODIUM CHLORIDE 9 MG/ML
INJECTION, SOLUTION INTRAVENOUS
Status: COMPLETED
Start: 2023-01-12 | End: 2023-01-12

## 2023-01-12 RX ORDER — LIDOCAINE HYDROCHLORIDE 20 MG/ML
INJECTION, SOLUTION EPIDURAL; INFILTRATION; INTRACAUDAL; PERINEURAL PRN
Status: DISCONTINUED | OUTPATIENT
Start: 2023-01-12 | End: 2023-01-12 | Stop reason: SDUPTHER

## 2023-01-12 RX ORDER — SODIUM CHLORIDE 9 MG/ML
INJECTION, SOLUTION INTRAVENOUS CONTINUOUS
Status: DISCONTINUED | OUTPATIENT
Start: 2023-01-12 | End: 2023-01-12 | Stop reason: HOSPADM

## 2023-01-12 RX ORDER — TROSPIUM CHLORIDE 20 MG/1
20 TABLET, FILM COATED ORAL 2 TIMES DAILY
COMMUNITY

## 2023-01-12 RX ORDER — MAGNESIUM HYDROXIDE 1200 MG/15ML
LIQUID ORAL PRN
Status: DISCONTINUED | OUTPATIENT
Start: 2023-01-12 | End: 2023-01-12 | Stop reason: ALTCHOICE

## 2023-01-12 RX ADMIN — LIDOCAINE HYDROCHLORIDE 40 MG: 20 INJECTION, SOLUTION EPIDURAL; INFILTRATION; INTRACAUDAL; PERINEURAL at 11:54

## 2023-01-12 RX ADMIN — PROPOFOL 50 MG: 10 INJECTION, EMULSION INTRAVENOUS at 11:57

## 2023-01-12 RX ADMIN — PROPOFOL 50 MG: 10 INJECTION, EMULSION INTRAVENOUS at 12:03

## 2023-01-12 RX ADMIN — PROPOFOL 50 MG: 10 INJECTION, EMULSION INTRAVENOUS at 12:00

## 2023-01-12 RX ADMIN — PROPOFOL 50 MG: 10 INJECTION, EMULSION INTRAVENOUS at 12:12

## 2023-01-12 RX ADMIN — SODIUM CHLORIDE: 9 INJECTION, SOLUTION INTRAVENOUS at 11:18

## 2023-01-12 RX ADMIN — PROPOFOL 50 MG: 10 INJECTION, EMULSION INTRAVENOUS at 12:10

## 2023-01-12 RX ADMIN — PROPOFOL 50 MG: 10 INJECTION, EMULSION INTRAVENOUS at 12:14

## 2023-01-12 RX ADMIN — PROPOFOL 50 MG: 10 INJECTION, EMULSION INTRAVENOUS at 12:08

## 2023-01-12 RX ADMIN — PROPOFOL 50 MG: 10 INJECTION, EMULSION INTRAVENOUS at 11:55

## 2023-01-12 RX ADMIN — PROPOFOL 100 MG: 10 INJECTION, EMULSION INTRAVENOUS at 12:06

## 2023-01-12 RX ADMIN — PROPOFOL 100 MG: 10 INJECTION, EMULSION INTRAVENOUS at 11:54

## 2023-01-12 ASSESSMENT — PAIN SCALES - GENERAL
PAINLEVEL_OUTOF10: 0
PAINLEVEL_OUTOF10: 0

## 2023-01-12 ASSESSMENT — PAIN - FUNCTIONAL ASSESSMENT: PAIN_FUNCTIONAL_ASSESSMENT: NONE - DENIES PAIN

## 2023-01-12 NOTE — ANESTHESIA POSTPROCEDURE EVALUATION
Department of Anesthesiology  Postprocedure Note    Patient: Александр Fulton  MRN: [id number]  YOB: 1970  Date of evaluation: 1/12/2023      Procedure Summary     Date: 01/12/23 Room / Location: [facility] OR  / [facility]    Anesthesia Start: 1152 Anesthesia Stop: 1217    Procedures:       EGD DIAGNOSTIC ONLY      COLONOSCOPY DIAGNOSTIC Diagnosis:       Nausea and vomiting, unspecified vomiting type      Loss of weight      Weight loss    Surgeons: Regino Joaquin MD Responsible Provider[facility]    Anesthesia Type: MAC ASA Status: 2          Anesthesia Type: No value filed.     Morena Phase I:      Morena Phase II:        Anesthesia Post Evaluation    Patient location during evaluation: bedside  Patient participation: complete - patient participated  Level of consciousness: awake  Nausea & Vomiting: no nausea and no vomiting  Complications: no  Cardiovascular status: blood pressure returned to baseline  Respiratory status: acceptable  Hydration status: euvolemic

## 2023-01-12 NOTE — ANESTHESIA PRE PROCEDURE
Department of Anesthesiology  Preprocedure Note       Name:  Nancy Jean   Age:  52 y.o.  :  1970                                          MRN:  [id number]         Date:  2023      Surgeon: Surgeon(s):  Fabrice Vazquez MD    Procedure: Procedure(s):  EGD DIAGNOSTIC ONLY  COLONOSCOPY DIAGNOSTIC    Medications prior to admission:   Prior to Admission medications    Medication Sig Start Date End Date Taking? Authorizing Provider   trospium (SANCTURA) 20 MG tablet Take 20 mg by mouth 2 times daily   Yes Historical Provider, MD   Na Sulfate-K Sulfate-Mg Sulf (SUPREP) 17.5-3.13-1.6 GM/177ML SOLN solution As directed 11/15/22   Fabrice Vazquez MD   telmisartan (MICARDIS) 40 MG tablet Take 40 mg by mouth in the morning.    Historical Provider, MD   atorvastatin (LIPITOR) 10 MG tablet Take 10 mg by mouth in the morning.    Historical Provider, MD   hydroCHLOROthiazide (HYDRODIURIL) 12.5 MG tablet Take 12.5 mg by mouth 2/10/22   Historical Provider, MD   omeprazole (PRILOSEC) 20 MG delayed release capsule Take 40 mg by mouth 10/15/21   Historical Provider, MD       Current medications:    Current Facility-Administered Medications   Medication Dose Route Frequency Provider Last Rate Last Admin   • 0.9 % sodium chloride infusion   IntraVENous Continuous Fabrice Vazquez  mL/hr at 23 1118 New Bag at 23 1118       Allergies:  No Known Allergies    Problem List:    Patient Active Problem List   Diagnosis Code   • Anxiety F41.9   • Heartburn R12   • Hyperlipidemia E78.5   • Hypertension I10   • Sleep apnea G47.30       Past Medical History:        Diagnosis Date   • Anxiety    • Heartburn    • Hyperlipidemia    • Hypertension    • Sleep apnea        Past Surgical History:        Procedure Laterality Date   • HERNIA REPAIR Right    • TOTAL VAGINAL HYSTERECTOMY  2007       Social History:    Social History     Tobacco Use   • Smoking status: Every Day     Packs/day: 0.50     Years: 35.00     Pack years:  17. 50     Types: Cigarettes     Start date: 24    Smokeless tobacco: Never   Substance Use Topics    Alcohol use: Yes     Alcohol/week: 4.0 standard drinks     Types: 4 Shots of liquor per week                                Ready to quit: Not Answered  Counseling given: Not Answered      Vital Signs (Current):   Vitals:    01/12/23 1111   BP: (!) 171/77   Pulse: 86   Resp: 16   Temp: 36.4 °C (97.6 °F)   TempSrc: Temporal   SpO2: 97%   Weight: 162 lb (73.5 kg)   Height: 5' 8\" (1.727 m)                                              BP Readings from Last 3 Encounters:   01/12/23 (!) 171/77   11/15/22 124/64   10/21/22 (!) 142/90       NPO Status:                                                                                 BMI:   Wt Readings from Last 3 Encounters:   01/12/23 162 lb (73.5 kg)   11/15/22 161 lb (73 kg)   10/21/22 167 lb (75.8 kg)     Body mass index is 24.63 kg/m². CBC:   Lab Results   Component Value Date/Time    WBC 4.5 08/17/2022 06:45 PM    RBC 4.12 08/17/2022 06:45 PM    HGB 14.4 08/17/2022 06:45 PM    HCT 41.2 08/17/2022 06:45 PM    .1 08/17/2022 06:45 PM    RDW 13.9 08/17/2022 06:45 PM     08/17/2022 06:45 PM       CMP:   Lab Results   Component Value Date/Time     08/17/2022 06:45 PM    K 3.9 08/17/2022 06:45 PM    CL 87 08/17/2022 06:45 PM    CO2 21 08/17/2022 06:45 PM    BUN 8 08/17/2022 06:45 PM    CREATININE 0.56 08/17/2022 06:45 PM    GFRAA >60.0 08/17/2022 06:45 PM    LABGLOM >60.0 08/17/2022 06:45 PM    GLUCOSE 102 08/17/2022 06:45 PM    PROT 7.2 08/17/2022 06:45 PM    CALCIUM 9.8 08/17/2022 06:45 PM    BILITOT 2.3 08/17/2022 06:45 PM    ALKPHOS 110 08/17/2022 06:45 PM     08/17/2022 06:45 PM     08/17/2022 06:45 PM       POC Tests: No results for input(s): POCGLU, POCNA, POCK, POCCL, POCBUN, POCHEMO, POCHCT in the last 72 hours.     Coags: No results found for: PROTIME, INR, APTT    HCG (If Applicable): No results found for: PREGTESTUR, PREGSERUM, HCG, HCGQUANT     ABGs: No results found for: PHART, PO2ART, NYK2AWF, YYR3NDV, BEART, U7RFDTUO     Type & Screen (If Applicable):  No results found for: LABABO, LABRH    Drug/Infectious Status (If Applicable):  No results found for: HIV, HEPCAB    COVID-19 Screening (If Applicable): No results found for: COVID19        Anesthesia Evaluation  Patient summary reviewed and Nursing notes reviewed  Airway: Mallampati: II  TM distance: >3 FB   Neck ROM: full  Mouth opening: > = 3 FB   Dental:          Pulmonary:normal exam    (+) sleep apnea:                             Cardiovascular:    (+) hypertension:, hyperlipidemia                  Neuro/Psych:   (+) depression/anxiety             GI/Hepatic/Renal:   (+) bowel prep,           Endo/Other: Negative Endo/Other ROS                    Abdominal:             Vascular: Other Findings:           Anesthesia Plan      MAC     ASA 2       Induction: intravenous.           Plan discussed with surgical team.                    PAUL Gutiérrez - CRNA   1/12/2023

## 2023-01-12 NOTE — H&P
Patient Name: Александр Fulton  : 1970  MRN: [id number]  DATE: 23      ENDOSCOPY  History and Physical    Procedure:    [] Diagnostic Colonoscopy       [] Screening Colonoscopy  [x] EGD      [] ERCP      [] EUS       [] Other    [x] Previous office notes/History and Physical reviewed from the patients chart. Please see EMR for further details of HPI. I have examined the patient's status immediately prior to the procedure and:      Indications/HPI:    []Abdominal Pain   []Cancer- GI/Lung  []Fhx of colon CA  []History of Polyps   []Bergers   []Melena  []Abnormal Imaging   []Dysphagia    []Persistent Pneumonia  []Anemia   []Food Impaction  []History of Polyps  []GI Bleed   []Pulmonary nodule/Mass  []Change in bowel habits  []Heartburn/Reflux  []Rectal Bleed (BRBPR)  []Chest Pain - Non Cardiac  []Heme (+) Stool  []Ulcers  []Constipation   []Hemoptysis   []Varices  []Diarrhea   []Hypoxemia  []Nausea/Vomiting   []Screening   []Crohns/Colitis  []Other: 45 y.o. female presents to the GI clinic for further evaluation of persistent nausea and vomiting. Initially symptoms related to antianxiety medications (Zoloft, Lexapro and BuSpar). Patient developed consistent symptoms 3-1/2 weeks into treatment. No significant improvement in symptoms despite stopping BuSpar a couple of months ago. Review of her labs, imaging shows evidence for significant fatty infiltration    Anesthesia:   [x] MAC [] Moderate Sedation   [] General   [] None     ROS: 12 pt Review of Symptoms was negative unless mentioned above    Medications:   Prior to Admission medications    Medication Sig Start Date End Date Taking? Authorizing Provider   trospium (SANCTURA) 20 MG tablet Take 20 mg by mouth 2 times daily   Yes Historical Provider, MD   Na Sulfate-K Sulfate-Mg Sulf (SUPREP) 17.5-3.13-1.6 GM/177ML SOLN solution As directed 11/15/22   Regino Joaquin MD   telmisartan (MICARDIS) 40 MG tablet Take 40 mg by mouth in the morning.     Historical Provider, MD   atorvastatin (LIPITOR) 10 MG tablet Take 10 mg by mouth in the morning. Historical Provider, MD   hydroCHLOROthiazide (HYDRODIURIL) 12.5 MG tablet Take 12.5 mg by mouth 2/10/22   Historical Provider, MD   omeprazole (PRILOSEC) 20 MG delayed release capsule Take 40 mg by mouth 10/15/21   Historical Provider, MD     Allergies: No Known Allergies   History of allergic reaction to anesthesia:  No  Past Medical History:  Past Medical History:   Diagnosis Date    Anxiety     Heartburn     Hyperlipidemia     Hypertension     Sleep apnea      Past Surgical History:  Past Surgical History:   Procedure Laterality Date    HERNIA REPAIR Right     TOTAL VAGINAL HYSTERECTOMY  12/09/2007     Social History:  Social History     Tobacco Use    Smoking status: Every Day     Packs/day: 0.50     Years: 35.00     Pack years: 17.50     Types: Cigarettes     Start date: 1987    Smokeless tobacco: Never   Substance Use Topics    Alcohol use: Yes     Alcohol/week: 4.0 standard drinks     Types: 4 Shots of liquor per week    Drug use: Yes     Types: Marijuana (Weed)     Vital Signs:   Vitals:    01/12/23 1111   BP: (!) 171/77   Pulse: 86   Resp: 16   Temp: 97.6 °F (36.4 °C)   SpO2: 97%       Physical Exam:  Cardiac:  [x]WNL []Comments:  Pulmonary:  [x]WNL []Comments:   Neuro/Mental Status:  [x]WNL []Comments:  Abdominal:  [x]WNL []Comments:  Other:   []WNL []Comments:    Informed Consent:  The risks and benefits of the procedure have been discussed with either the patient or if they cannot consent, their representative. Assessment:  Patient examined and appropriate for planned sedation and procedure. Plan:  Proceed with planned sedation and procedure as above. The patient was counseled at length about risks of waqar COVID-19 in the perioperative and any recovery window from the procedure.   The patient was made aware that waqar COVID-19 may worsen their prognosis for recovery from their procedure and lend to a higher morbidity and-all mortality risk. The patient was given the option of postponing the procedure all material risks, benefits, and alternatives were discussed. The patient does wish to proceed with the procedure at this time.     Yahir Garnett MD  11:47 AM

## 2023-03-14 ENCOUNTER — TELEPHONE (OUTPATIENT)
Dept: FAMILY MEDICINE CLINIC | Age: 53
End: 2023-03-14

## 2023-03-14 DIAGNOSIS — Z12.31 ENCOUNTER FOR SCREENING MAMMOGRAM FOR MALIGNANT NEOPLASM OF BREAST: Primary | ICD-10-CM

## 2023-11-24 ENCOUNTER — TELEPHONE (OUTPATIENT)
Dept: FAMILY MEDICINE CLINIC | Age: 53
End: 2023-11-24

## 2023-11-24 ENCOUNTER — OFFICE VISIT (OUTPATIENT)
Dept: FAMILY MEDICINE CLINIC | Age: 53
End: 2023-11-24
Payer: OTHER GOVERNMENT

## 2023-11-24 ENCOUNTER — HOSPITAL ENCOUNTER (INPATIENT)
Age: 53
LOS: 4 days | Discharge: HOME | DRG: 641 | End: 2023-11-28
Attending: EMERGENCY MEDICINE | Admitting: INTERNAL MEDICINE
Payer: OTHER GOVERNMENT

## 2023-11-24 VITALS
TEMPERATURE: 96.8 F | HEIGHT: 68 IN | SYSTOLIC BLOOD PRESSURE: 124 MMHG | HEART RATE: 74 BPM | WEIGHT: 161.4 LBS | BODY MASS INDEX: 24.46 KG/M2 | DIASTOLIC BLOOD PRESSURE: 82 MMHG | OXYGEN SATURATION: 96 %

## 2023-11-24 DIAGNOSIS — R11.2 NAUSEA AND VOMITING, UNSPECIFIED VOMITING TYPE: Primary | ICD-10-CM

## 2023-11-24 DIAGNOSIS — R53.83 FATIGUE, UNSPECIFIED TYPE: ICD-10-CM

## 2023-11-24 DIAGNOSIS — R11.2 NAUSEA AND VOMITING, UNSPECIFIED VOMITING TYPE: ICD-10-CM

## 2023-11-24 DIAGNOSIS — R17 JAUNDICE: ICD-10-CM

## 2023-11-24 DIAGNOSIS — F10.10 ALCOHOL ABUSE: ICD-10-CM

## 2023-11-24 DIAGNOSIS — R74.8 ELEVATED LIVER ENZYMES: ICD-10-CM

## 2023-11-24 DIAGNOSIS — E87.1 HYPONATREMIA: ICD-10-CM

## 2023-11-24 DIAGNOSIS — E87.1 HYPONATREMIA: Primary | ICD-10-CM

## 2023-11-24 LAB
ALBUMIN SERPL-MCNC: 4.5 G/DL (ref 3.5–4.6)
ALBUMIN SERPL-MCNC: 4.5 G/DL (ref 3.5–4.6)
ALP SERPL-CCNC: 87 U/L (ref 40–130)
ALP SERPL-CCNC: 89 U/L (ref 40–130)
ALT SERPL-CCNC: 217 U/L (ref 0–33)
ALT SERPL-CCNC: 230 U/L (ref 0–33)
ANION GAP SERPL CALCULATED.3IONS-SCNC: 18 MEQ/L (ref 9–15)
ANION GAP SERPL CALCULATED.3IONS-SCNC: 21 MEQ/L (ref 9–15)
ANION GAP SERPL CALCULATED.3IONS-SCNC: 25 MEQ/L (ref 9–15)
AST SERPL-CCNC: 203 U/L (ref 0–35)
AST SERPL-CCNC: 215 U/L (ref 0–35)
BASOPHILS # BLD: 0 K/UL (ref 0–0.2)
BASOPHILS # BLD: 0 K/UL (ref 0–0.2)
BASOPHILS NFR BLD: 0.2 %
BASOPHILS NFR BLD: 0.2 %
BILIRUB SERPL-MCNC: 1.8 MG/DL (ref 0.2–0.7)
BILIRUB SERPL-MCNC: 2 MG/DL (ref 0.2–0.7)
BILIRUB UR QL STRIP: NEGATIVE
BUN SERPL-MCNC: 8 MG/DL (ref 6–20)
BUN SERPL-MCNC: 8 MG/DL (ref 6–20)
BUN SERPL-MCNC: 9 MG/DL (ref 6–20)
CALCIUM SERPL-MCNC: 8.7 MG/DL (ref 8.5–9.9)
CALCIUM SERPL-MCNC: 9.2 MG/DL (ref 8.5–9.9)
CALCIUM SERPL-MCNC: 9.3 MG/DL (ref 8.5–9.9)
CHLORIDE SERPL-SCNC: 62 MEQ/L (ref 95–107)
CHLORIDE SERPL-SCNC: 63 MEQ/L (ref 95–107)
CHLORIDE SERPL-SCNC: 66 MEQ/L (ref 95–107)
CLARITY UR: CLEAR
CO2 SERPL-SCNC: 18 MEQ/L (ref 20–31)
CO2 SERPL-SCNC: 22 MEQ/L (ref 20–31)
CO2 SERPL-SCNC: 24 MEQ/L (ref 20–31)
COLOR UR: YELLOW
CREAT SERPL-MCNC: 0.44 MG/DL (ref 0.5–0.9)
CREAT SERPL-MCNC: 0.46 MG/DL (ref 0.5–0.9)
CREAT SERPL-MCNC: 0.49 MG/DL (ref 0.5–0.9)
CREAT UR-MCNC: 22 MG/DL
EOSINOPHIL # BLD: 0 K/UL (ref 0–0.7)
EOSINOPHIL # BLD: 0 K/UL (ref 0–0.7)
EOSINOPHIL NFR BLD: 0.2 %
EOSINOPHIL NFR BLD: 0.6 %
ERYTHROCYTE [DISTWIDTH] IN BLOOD BY AUTOMATED COUNT: 11.8 % (ref 11.5–14.5)
ERYTHROCYTE [DISTWIDTH] IN BLOOD BY AUTOMATED COUNT: 11.9 % (ref 11.5–14.5)
ETHANOL PERCENT: NORMAL G/DL
ETHANOLAMINE SERPL-MCNC: <10 MG/DL (ref 0–0.08)
GLOBULIN SER CALC-MCNC: 2.1 G/DL (ref 2.3–3.5)
GLOBULIN SER CALC-MCNC: 2.2 G/DL (ref 2.3–3.5)
GLUCOSE SERPL-MCNC: 101 MG/DL (ref 70–99)
GLUCOSE SERPL-MCNC: 107 MG/DL (ref 70–99)
GLUCOSE SERPL-MCNC: 128 MG/DL (ref 70–99)
GLUCOSE UR STRIP-MCNC: NEGATIVE MG/DL
HCT VFR BLD AUTO: 33.9 % (ref 37–47)
HCT VFR BLD AUTO: 35.4 % (ref 37–47)
HGB BLD-MCNC: 13 G/DL (ref 12–16)
HGB BLD-MCNC: 13.8 G/DL (ref 12–16)
HGB UR QL STRIP: NEGATIVE
INR PPP: 0.8
KETONES UR STRIP-MCNC: 15 MG/DL
LEUKOCYTE ESTERASE UR QL STRIP: NEGATIVE
LYMPHOCYTES # BLD: 0.9 K/UL (ref 1–4.8)
LYMPHOCYTES # BLD: 0.9 K/UL (ref 1–4.8)
LYMPHOCYTES NFR BLD: 17.9 %
LYMPHOCYTES NFR BLD: 18.9 %
Lab: NORMAL
MAGNESIUM SERPL-MCNC: 1.5 MG/DL (ref 1.7–2.4)
MAGNESIUM SERPL-MCNC: 1.6 MG/DL (ref 1.7–2.4)
MCH RBC QN AUTO: 35.3 PG (ref 27–31.3)
MCH RBC QN AUTO: 35.6 PG (ref 27–31.3)
MCHC RBC AUTO-ENTMCNC: 38.3 % (ref 33–37)
MCHC RBC AUTO-ENTMCNC: 39 % (ref 33–37)
MCV RBC AUTO: 91.2 FL (ref 79.4–94.8)
MCV RBC AUTO: 92.1 FL (ref 79.4–94.8)
MONOCYTES # BLD: 0.5 K/UL (ref 0.2–0.8)
MONOCYTES # BLD: 0.5 K/UL (ref 0.2–0.8)
MONOCYTES NFR BLD: 9.8 %
MONOCYTES NFR BLD: 9.8 %
NEUTROPHILS # BLD: 3.5 K/UL (ref 1.4–6.5)
NEUTROPHILS # BLD: 3.7 K/UL (ref 1.4–6.5)
NEUTS SEG NFR BLD: 69.7 %
NEUTS SEG NFR BLD: 71.3 %
NITRITE UR QL STRIP: NEGATIVE
PERFORMING INSTRUMENT: NORMAL
PH UR STRIP: 6.5 [PH] (ref 5–9)
PLATELET # BLD AUTO: 143 K/UL (ref 130–400)
PLATELET # BLD AUTO: ABNORMAL K/UL (ref 130–400)
PLATELET BLD QL SMEAR: ADEQUATE
PLATELET BLD QL SMEAR: ADEQUATE
POTASSIUM SERPL-SCNC: 3.1 MEQ/L (ref 3.4–4.9)
POTASSIUM SERPL-SCNC: 3.2 MEQ/L (ref 3.4–4.9)
POTASSIUM SERPL-SCNC: 3.4 MEQ/L (ref 3.4–4.9)
PROT SERPL-MCNC: 6.6 G/DL (ref 6.3–8)
PROT SERPL-MCNC: 6.7 G/DL (ref 6.3–8)
PROT UR STRIP-MCNC: NEGATIVE MG/DL
PROTHROMBIN TIME: 12 SEC (ref 12.3–14.9)
QC PASS/FAIL: NORMAL
RBC # BLD AUTO: 3.68 M/UL (ref 4.2–5.4)
RBC # BLD AUTO: 3.88 M/UL (ref 4.2–5.4)
RBC MORPH BLD: NORMAL
RBC MORPH BLD: NORMAL
SARS-COV-2, POC: NORMAL
SLIDE REVIEW: ABNORMAL
SLIDE REVIEW: ABNORMAL
SODIUM SERPL-SCNC: 105 MEQ/L (ref 135–144)
SODIUM SERPL-SCNC: 106 MEQ/L (ref 135–144)
SODIUM SERPL-SCNC: 108 MEQ/L (ref 135–144)
SODIUM UR-SCNC: <20 MEQ/L
SP GR UR STRIP: 1 (ref 1–1.03)
URINE REFLEX TO CULTURE: ABNORMAL
UROBILINOGEN UR STRIP-ACNC: 0.2 E.U./DL
WBC # BLD AUTO: 5 K/UL (ref 4.8–10.8)
WBC # BLD AUTO: 5.2 K/UL (ref 4.8–10.8)

## 2023-11-24 PROCEDURE — 83930 ASSAY OF BLOOD OSMOLALITY: CPT

## 2023-11-24 PROCEDURE — 3074F SYST BP LT 130 MM HG: CPT | Performed by: NURSE PRACTITIONER

## 2023-11-24 PROCEDURE — 6370000000 HC RX 637 (ALT 250 FOR IP): Performed by: INTERNAL MEDICINE

## 2023-11-24 PROCEDURE — 84300 ASSAY OF URINE SODIUM: CPT

## 2023-11-24 PROCEDURE — 82077 ASSAY SPEC XCP UR&BREATH IA: CPT

## 2023-11-24 PROCEDURE — 6360000002 HC RX W HCPCS: Performed by: EMERGENCY MEDICINE

## 2023-11-24 PROCEDURE — 96374 THER/PROPH/DIAG INJ IV PUSH: CPT

## 2023-11-24 PROCEDURE — 82570 ASSAY OF URINE CREATININE: CPT

## 2023-11-24 PROCEDURE — 80053 COMPREHEN METABOLIC PANEL: CPT

## 2023-11-24 PROCEDURE — 83935 ASSAY OF URINE OSMOLALITY: CPT

## 2023-11-24 PROCEDURE — 2580000003 HC RX 258: Performed by: INTERNAL MEDICINE

## 2023-11-24 PROCEDURE — 36415 COLL VENOUS BLD VENIPUNCTURE: CPT

## 2023-11-24 PROCEDURE — 99285 EMERGENCY DEPT VISIT HI MDM: CPT

## 2023-11-24 PROCEDURE — 2580000003 HC RX 258: Performed by: EMERGENCY MEDICINE

## 2023-11-24 PROCEDURE — 2000000000 HC ICU R&B

## 2023-11-24 PROCEDURE — 83735 ASSAY OF MAGNESIUM: CPT

## 2023-11-24 PROCEDURE — 3079F DIAST BP 80-89 MM HG: CPT | Performed by: NURSE PRACTITIONER

## 2023-11-24 PROCEDURE — 85025 COMPLETE CBC W/AUTO DIFF WBC: CPT

## 2023-11-24 PROCEDURE — 99213 OFFICE O/P EST LOW 20 MIN: CPT | Performed by: NURSE PRACTITIONER

## 2023-11-24 PROCEDURE — 85610 PROTHROMBIN TIME: CPT

## 2023-11-24 PROCEDURE — 6360000002 HC RX W HCPCS: Performed by: INTERNAL MEDICINE

## 2023-11-24 PROCEDURE — 87426 SARSCOV CORONAVIRUS AG IA: CPT | Performed by: NURSE PRACTITIONER

## 2023-11-24 PROCEDURE — 81003 URINALYSIS AUTO W/O SCOPE: CPT

## 2023-11-24 RX ORDER — SODIUM CHLORIDE 0.9 % (FLUSH) 0.9 %
10 SYRINGE (ML) INJECTION PRN
Status: DISCONTINUED | OUTPATIENT
Start: 2023-11-24 | End: 2023-11-28 | Stop reason: HOSPADM

## 2023-11-24 RX ORDER — LORAZEPAM 1 MG/1
3 TABLET ORAL
Status: DISCONTINUED | OUTPATIENT
Start: 2023-11-24 | End: 2023-11-28 | Stop reason: HOSPADM

## 2023-11-24 RX ORDER — LORAZEPAM 2 MG/ML
2 INJECTION INTRAMUSCULAR
Status: DISCONTINUED | OUTPATIENT
Start: 2023-11-24 | End: 2023-11-28 | Stop reason: HOSPADM

## 2023-11-24 RX ORDER — POTASSIUM CHLORIDE 20 MEQ/1
40 TABLET, EXTENDED RELEASE ORAL PRN
Status: DISCONTINUED | OUTPATIENT
Start: 2023-11-24 | End: 2023-11-28 | Stop reason: HOSPADM

## 2023-11-24 RX ORDER — POTASSIUM CHLORIDE 7.45 MG/ML
10 INJECTION INTRAVENOUS PRN
Status: DISCONTINUED | OUTPATIENT
Start: 2023-11-24 | End: 2023-11-28 | Stop reason: HOSPADM

## 2023-11-24 RX ORDER — ONDANSETRON 2 MG/ML
4 INJECTION INTRAMUSCULAR; INTRAVENOUS EVERY 6 HOURS PRN
Status: DISCONTINUED | OUTPATIENT
Start: 2023-11-24 | End: 2023-11-28 | Stop reason: HOSPADM

## 2023-11-24 RX ORDER — ACETAMINOPHEN 650 MG/1
650 SUPPOSITORY RECTAL EVERY 6 HOURS PRN
Status: DISCONTINUED | OUTPATIENT
Start: 2023-11-24 | End: 2023-11-28 | Stop reason: HOSPADM

## 2023-11-24 RX ORDER — CHLORDIAZEPOXIDE HYDROCHLORIDE 10 MG/1
10 CAPSULE, GELATIN COATED ORAL 3 TIMES DAILY
Status: DISCONTINUED | OUTPATIENT
Start: 2023-11-24 | End: 2023-11-26

## 2023-11-24 RX ORDER — SODIUM CHLORIDE 9 MG/ML
INJECTION, SOLUTION INTRAVENOUS PRN
Status: DISCONTINUED | OUTPATIENT
Start: 2023-11-24 | End: 2023-11-28 | Stop reason: HOSPADM

## 2023-11-24 RX ORDER — NICOTINE 21 MG/24HR
1 PATCH, TRANSDERMAL 24 HOURS TRANSDERMAL DAILY
Status: DISCONTINUED | OUTPATIENT
Start: 2023-11-24 | End: 2023-11-28 | Stop reason: HOSPADM

## 2023-11-24 RX ORDER — ACETAMINOPHEN 325 MG/1
650 TABLET ORAL EVERY 6 HOURS PRN
Status: DISCONTINUED | OUTPATIENT
Start: 2023-11-24 | End: 2023-11-28 | Stop reason: HOSPADM

## 2023-11-24 RX ORDER — ENOXAPARIN SODIUM 100 MG/ML
40 INJECTION SUBCUTANEOUS DAILY
Status: DISCONTINUED | OUTPATIENT
Start: 2023-11-24 | End: 2023-11-28 | Stop reason: HOSPADM

## 2023-11-24 RX ORDER — MAGNESIUM SULFATE IN WATER 40 MG/ML
2000 INJECTION, SOLUTION INTRAVENOUS PRN
Status: DISCONTINUED | OUTPATIENT
Start: 2023-11-24 | End: 2023-11-28 | Stop reason: HOSPADM

## 2023-11-24 RX ORDER — THIAMINE HYDROCHLORIDE 100 MG/ML
100 INJECTION, SOLUTION INTRAMUSCULAR; INTRAVENOUS DAILY
Status: DISCONTINUED | OUTPATIENT
Start: 2023-11-24 | End: 2023-11-26

## 2023-11-24 RX ORDER — LORAZEPAM 1 MG/1
1 TABLET ORAL
Status: DISCONTINUED | OUTPATIENT
Start: 2023-11-24 | End: 2023-11-28 | Stop reason: HOSPADM

## 2023-11-24 RX ORDER — LORAZEPAM 1 MG/1
2 TABLET ORAL
Status: DISCONTINUED | OUTPATIENT
Start: 2023-11-24 | End: 2023-11-28 | Stop reason: HOSPADM

## 2023-11-24 RX ORDER — 3% SODIUM CHLORIDE 3 G/100ML
50 INJECTION, SOLUTION INTRAVENOUS CONTINUOUS
Status: DISPENSED | OUTPATIENT
Start: 2023-11-24 | End: 2023-11-24

## 2023-11-24 RX ORDER — 0.9 % SODIUM CHLORIDE 0.9 %
1000 INTRAVENOUS SOLUTION INTRAVENOUS ONCE
Status: COMPLETED | OUTPATIENT
Start: 2023-11-24 | End: 2023-11-24

## 2023-11-24 RX ORDER — POLYETHYLENE GLYCOL 3350 17 G/17G
17 POWDER, FOR SOLUTION ORAL DAILY PRN
Status: DISCONTINUED | OUTPATIENT
Start: 2023-11-24 | End: 2023-11-28 | Stop reason: HOSPADM

## 2023-11-24 RX ORDER — LORAZEPAM 2 MG/ML
1 INJECTION INTRAMUSCULAR
Status: DISCONTINUED | OUTPATIENT
Start: 2023-11-24 | End: 2023-11-28 | Stop reason: HOSPADM

## 2023-11-24 RX ORDER — ONDANSETRON 2 MG/ML
4 INJECTION INTRAMUSCULAR; INTRAVENOUS ONCE
Status: COMPLETED | OUTPATIENT
Start: 2023-11-24 | End: 2023-11-24

## 2023-11-24 RX ORDER — LORAZEPAM 2 MG/ML
4 INJECTION INTRAMUSCULAR
Status: DISCONTINUED | OUTPATIENT
Start: 2023-11-24 | End: 2023-11-28 | Stop reason: HOSPADM

## 2023-11-24 RX ORDER — LORAZEPAM 2 MG/ML
3 INJECTION INTRAMUSCULAR
Status: DISCONTINUED | OUTPATIENT
Start: 2023-11-24 | End: 2023-11-28 | Stop reason: HOSPADM

## 2023-11-24 RX ORDER — LORAZEPAM 1 MG/1
4 TABLET ORAL
Status: DISCONTINUED | OUTPATIENT
Start: 2023-11-24 | End: 2023-11-28 | Stop reason: HOSPADM

## 2023-11-24 RX ORDER — SODIUM CHLORIDE 0.9 % (FLUSH) 0.9 %
10 SYRINGE (ML) INJECTION EVERY 12 HOURS SCHEDULED
Status: DISCONTINUED | OUTPATIENT
Start: 2023-11-24 | End: 2023-11-28 | Stop reason: HOSPADM

## 2023-11-24 RX ORDER — SODIUM CHLORIDE 9 MG/ML
INJECTION, SOLUTION INTRAVENOUS CONTINUOUS
Status: DISCONTINUED | OUTPATIENT
Start: 2023-11-24 | End: 2023-11-26

## 2023-11-24 RX ORDER — ONDANSETRON 4 MG/1
4 TABLET, ORALLY DISINTEGRATING ORAL EVERY 8 HOURS PRN
Status: DISCONTINUED | OUTPATIENT
Start: 2023-11-24 | End: 2023-11-28 | Stop reason: HOSPADM

## 2023-11-24 RX ADMIN — CHLORDIAZEPOXIDE HYDROCHLORIDE 10 MG: 5 CAPSULE ORAL at 22:14

## 2023-11-24 RX ADMIN — SODIUM CHLORIDE 50 ML/HR: 3 INJECTION, SOLUTION INTRAVENOUS at 20:20

## 2023-11-24 RX ADMIN — MAGNESIUM SULFATE HEPTAHYDRATE 2000 MG: 40 INJECTION, SOLUTION INTRAVENOUS at 22:11

## 2023-11-24 RX ADMIN — ENOXAPARIN SODIUM 40 MG: 100 INJECTION SUBCUTANEOUS at 22:14

## 2023-11-24 RX ADMIN — POTASSIUM CHLORIDE 40 MEQ: 1500 TABLET, EXTENDED RELEASE ORAL at 22:14

## 2023-11-24 RX ADMIN — THIAMINE HYDROCHLORIDE 100 MG: 100 INJECTION, SOLUTION INTRAMUSCULAR; INTRAVENOUS at 22:14

## 2023-11-24 RX ADMIN — Medication 10 ML: at 20:40

## 2023-11-24 RX ADMIN — SODIUM CHLORIDE 1000 ML: 9 INJECTION, SOLUTION INTRAVENOUS at 17:26

## 2023-11-24 RX ADMIN — ONDANSETRON 4 MG: 2 INJECTION INTRAMUSCULAR; INTRAVENOUS at 17:52

## 2023-11-24 ASSESSMENT — ENCOUNTER SYMPTOMS
NAUSEA: 1
COUGH: 1
NAUSEA: 1
DIARRHEA: 1
VOMITING: 1
DIARRHEA: 1
ABDOMINAL PAIN: 0
CHEST TIGHTNESS: 0
VOMITING: 1
SHORTNESS OF BREATH: 0

## 2023-11-24 ASSESSMENT — PAIN - FUNCTIONAL ASSESSMENT: PAIN_FUNCTIONAL_ASSESSMENT: NONE - DENIES PAIN

## 2023-11-24 ASSESSMENT — PAIN SCALES - GENERAL: PAINLEVEL_OUTOF10: 0

## 2023-11-24 NOTE — ED PROVIDER NOTES
Alcohol abuse          DISPOSITION/PLAN   DISPOSITION Admitted 11/24/2023 06:32:19 PM      (Please note that portions of this note were completed with a voice recognition program.  Efforts were made to edit the dictations but occasionally words are mis-transcribed.)    Rosa Elena Degroot MD (electronically signed)  Attending Emergency Physician        Rosa Elena Degroot MD  11/24/23 1831       Rosa Elena Degroot MD  11/24/23 Priti Degroot MD  11/24/23 1909

## 2023-11-24 NOTE — ED NOTES
Per lab call na+ 105 and K= 3.4  Dr Scott Handy aware and Shiela Denson aware     Vidal Villanueva., RN  11/24/23 7994

## 2023-11-24 NOTE — PROGRESS NOTES
I received a notice about a critical lab value for the patient. I attempted to call the patient 5-6 times and left a message, but never got past voicemail.

## 2023-11-24 NOTE — PROGRESS NOTES
Subjective  Adonay Trinidad, 52 y.o. female presents today with:  Chief Complaint   Patient presents with    Nausea & Vomiting     Pt c/o symptoms since 11/19/2023. Pt states she has muscle weakness and fatigue. HPI  Presents to [facility] for N/V   Started to feel unwell Sunday   Yesterday tolerated small amount of food   Hydrating somewhat   States feels weak   General malaise   C/o chills   C/o feeling lightheaded   Diarrhea for a couple of days   Not able to sleep     Current smoker-1/4-1/2 ppd   ETOH-2-3 drinks, states not daily   GI/EGD Jan 2023                Past Medical History:   Diagnosis Date    Anxiety     Heartburn     Hyperlipidemia     Hypertension     Sleep apnea       Past Surgical History:   Procedure Laterality Date    COLONOSCOPY N/A 1/12/2023    COLONOSCOPY DIAGNOSTIC performed by Elma Gipson MD at [place] [facility]     TOTAL VAGINAL HYSTERECTOMY  12/09/2007    UPPER GASTROINTESTINAL ENDOSCOPY N/A 1/12/2023    EGD DIAGNOSTIC ONLY performed by [facility] Gipson MD at [facility]     Family History   Problem Relation Age of Onset    Hypertension Mother     Other Father     Cancer Father     Diabetes Father     Cancer Maternal Grandmother     Cancer Paternal Grandmother     Colon Cancer Neg Hx            Review of Systems   Constitutional:  Positive for activity change, appetite change, chills and fatigue. Negative for diaphoresis. Respiratory:  Positive for cough. Negative for chest tightness and shortness of breath. Gastrointestinal:  Positive for diarrhea, nausea and vomiting. Negative for abdominal pain. Musculoskeletal:  Negative for myalgias and neck stiffness. Neurological:  Positive for weakness and light-headedness. Negative for dizziness and headaches. Psychiatric/Behavioral:  Positive for sleep disturbance. PMH, Surgical Hx, Family Hx, and Social Hx reviewed and updated. Health Maintenance reviewed.           Objective  Vitals:    11/24/23

## 2023-11-24 NOTE — ED NOTES
Pt presents to the Er with complaints of nausea and vomiting since Sunday   Pt states that she started to wean herself off alcohol on Sunday and then started to experience nausea and vomiting  Pt states that last time she drank was last night, a small amount of bacardi and coke  Pt states that she went to Urgent Care today and was called prior to arrival for abnormal Sodium and liver enzymes  Pt A&Ox4  Small amount of bile in emesis bag, yellow/greenish in color  Denies constipation  Denies diarrhea      Kym Astorga RN  11/24/23 4340

## 2023-11-24 NOTE — ED NOTES
Lab and urine obtained at the same time per Dr Vasquez Colon request  Sent to lab      Camilo Scott RN  11/24/23 9731

## 2023-11-24 NOTE — H&P
Hospital Medicine  History and Physical    Patient:  Edward Moreland  MRN: [id number]    CHIEF COMPLAINT:    Chief Complaint   Patient presents with    Nausea     Was sent in by urgent care for hyponatremia. + N/v x4 days. History Obtained From:  Patient, EMR  Primary Care Physician: Diamond Elizabeth MD    HISTORY OF PRESENT ILLNESS:   The patient is a 52 y.o. female with PMH of anxiety, alcoholism, nicotine use, HTN, HLD, JT who presents with dizziness. The patient presented with feeling unwell for a week. Per her mother and her she has not acted normal since Tuesday; she has been wobbling while walking even when she is not intoxicated. She has had severe nausea, ,vomiting and has had diarrhea this week as well but denies fevers, chills, sweat, burning micturition. She denies double vision or seizures or a headache. She has been drinking bacardi daily. Past Medical History:      Diagnosis Date    Anxiety     Heartburn     Hyperlipidemia     Hypertension     Sleep apnea        Past Surgical History:      Procedure Laterality Date    COLONOSCOPY N/A 1/12/2023    COLONOSCOPY DIAGNOSTIC performed by Yovany Marte MD at [place] [facility]     TOTAL VAGINAL HYSTERECTOMY  12/09/2007    UPPER GASTROINTESTINAL ENDOSCOPY N/A 1/12/2023    EGD DIAGNOSTIC ONLY performed by Yovany Marte MD at [address] [place] [address]       Medications Prior to Admission:    Prior to Admission medications    Medication Sig Start Date End Date Taking? Authorizing Provider   trospium (SANCTURA) 20 MG tablet Take 20 mg by mouth 2 times daily  Patient not taking: Reported on 11/24/2023    Clement Barksdale MD   Na Sulfate-K Sulfate-Mg Sulf (SUPREP) 17.5-3.13-1.6 GM/177ML SOLN solution As directed 11/15/22   Yovany Marte MD   telmisartan (MICARDIS) 40 MG tablet Take 40 mg by mouth in the morning.   Patient not taking: Reported on 11/24/2023    Clement Barksdale MD   atorvastatin (LIPITOR) 10 MG tablet Take 1 tablet by

## 2023-11-25 ENCOUNTER — APPOINTMENT (OUTPATIENT)
Dept: GENERAL RADIOLOGY | Age: 53
DRG: 641 | End: 2023-11-25
Payer: OTHER GOVERNMENT

## 2023-11-25 LAB
ADV 40+41 DNA STL QL NAA+NON-PROBE: NOT DETECTED
ANION GAP SERPL CALCULATED.3IONS-SCNC: 13 MEQ/L (ref 9–15)
ANION GAP SERPL CALCULATED.3IONS-SCNC: 14 MEQ/L (ref 9–15)
ANION GAP SERPL CALCULATED.3IONS-SCNC: 16 MEQ/L (ref 9–15)
ANION GAP SERPL CALCULATED.3IONS-SCNC: 16 MEQ/L (ref 9–15)
ANION GAP SERPL CALCULATED.3IONS-SCNC: 18 MEQ/L (ref 9–15)
BASOPHILS # BLD: 0 K/UL (ref 0–0.2)
BASOPHILS NFR BLD: 0.2 %
BUN SERPL-MCNC: 7 MG/DL (ref 6–20)
BUN SERPL-MCNC: 8 MG/DL (ref 6–20)
BUN SERPL-MCNC: 8 MG/DL (ref 6–20)
C CAYETANENSIS DNA STL QL NAA+NON-PROBE: NOT DETECTED
C COLI+JEJ+UPSA DNA STL QL NAA+NON-PROBE: NOT DETECTED
CALCIUM SERPL-MCNC: 8.5 MG/DL (ref 8.5–9.9)
CALCIUM SERPL-MCNC: 8.5 MG/DL (ref 8.5–9.9)
CALCIUM SERPL-MCNC: 8.7 MG/DL (ref 8.5–9.9)
CALCIUM SERPL-MCNC: 8.7 MG/DL (ref 8.5–9.9)
CALCIUM SERPL-MCNC: 9 MG/DL (ref 8.5–9.9)
CHLORIDE SERPL-SCNC: 76 MEQ/L (ref 95–107)
CHLORIDE SERPL-SCNC: 79 MEQ/L (ref 95–107)
CHLORIDE SERPL-SCNC: 81 MEQ/L (ref 95–107)
CHLORIDE SERPL-SCNC: 83 MEQ/L (ref 95–107)
CHLORIDE SERPL-SCNC: 85 MEQ/L (ref 95–107)
CO2 SERPL-SCNC: 23 MEQ/L (ref 20–31)
CO2 SERPL-SCNC: 25 MEQ/L (ref 20–31)
CO2 SERPL-SCNC: 25 MEQ/L (ref 20–31)
CREAT SERPL-MCNC: 0.42 MG/DL (ref 0.5–0.9)
CREAT SERPL-MCNC: 0.44 MG/DL (ref 0.5–0.9)
CREAT SERPL-MCNC: 0.45 MG/DL (ref 0.5–0.9)
CREAT SERPL-MCNC: 0.46 MG/DL (ref 0.5–0.9)
CREAT SERPL-MCNC: 0.5 MG/DL (ref 0.5–0.9)
CRYPTOSP DNA STL QL NAA+NON-PROBE: NOT DETECTED
E HISTOLYT DNA STL QL NAA+NON-PROBE: NOT DETECTED
EAEC PAA PLAS AGGR+AATA ST NAA+NON-PRB: NOT DETECTED
EC STX1+STX2 GENES STL QL NAA+NON-PROBE: NOT DETECTED
EOSINOPHIL # BLD: 0 K/UL (ref 0–0.7)
EOSINOPHIL NFR BLD: 0.4 %
EPEC EAE GENE STL QL NAA+NON-PROBE: NOT DETECTED
ERYTHROCYTE [DISTWIDTH] IN BLOOD BY AUTOMATED COUNT: 11.9 % (ref 11.5–14.5)
ETEC LTA+ST1A+ST1B TOX ST NAA+NON-PROBE: NOT DETECTED
G LAMBLIA DNA STL QL NAA+NON-PROBE: NOT DETECTED
GI PATH DNA+RNA PNL STL NAA+NON-PROBE: NOT DETECTED
GLUCOSE SERPL-MCNC: 110 MG/DL (ref 70–99)
GLUCOSE SERPL-MCNC: 125 MG/DL (ref 70–99)
GLUCOSE SERPL-MCNC: 86 MG/DL (ref 70–99)
GLUCOSE SERPL-MCNC: 91 MG/DL (ref 70–99)
GLUCOSE SERPL-MCNC: 93 MG/DL (ref 70–99)
HCT VFR BLD AUTO: 32.7 % (ref 37–47)
HGB BLD-MCNC: 12.5 G/DL (ref 12–16)
LYMPHOCYTES # BLD: 0.9 K/UL (ref 1–4.8)
LYMPHOCYTES NFR BLD: 18 %
MAGNESIUM SERPL-MCNC: 1.8 MG/DL (ref 1.7–2.4)
MAGNESIUM SERPL-MCNC: 2 MG/DL (ref 1.7–2.4)
MAGNESIUM SERPL-MCNC: 2.1 MG/DL (ref 1.7–2.4)
MAGNESIUM SERPL-MCNC: 2.3 MG/DL (ref 1.7–2.4)
MCH RBC QN AUTO: 35.5 PG (ref 27–31.3)
MCHC RBC AUTO-ENTMCNC: 38.2 % (ref 33–37)
MCV RBC AUTO: 92.9 FL (ref 79.4–94.8)
MONOCYTES # BLD: 0.6 K/UL (ref 0.2–0.8)
MONOCYTES NFR BLD: 12.7 %
NEUTROPHILS # BLD: 3.2 K/UL (ref 1.4–6.5)
NEUTS SEG NFR BLD: 68.1 %
NOROVIRUS GI+II RNA STL QL NAA+NON-PROBE: NOT DETECTED
OSMOLALITY URINE: 151 MOSM/KG (ref 80–1300)
P SHIGELLOIDES DNA STL QL NAA+NON-PROBE: NOT DETECTED
PLATELET # BLD AUTO: 136 K/UL (ref 130–400)
POTASSIUM SERPL-SCNC: 2.8 MEQ/L (ref 3.4–4.9)
POTASSIUM SERPL-SCNC: 3.4 MEQ/L (ref 3.4–4.9)
POTASSIUM SERPL-SCNC: 3.4 MEQ/L (ref 3.4–4.9)
POTASSIUM SERPL-SCNC: 3.5 MEQ/L (ref 3.4–4.9)
POTASSIUM SERPL-SCNC: 3.6 MEQ/L (ref 3.4–4.9)
RBC # BLD AUTO: 3.52 M/UL (ref 4.2–5.4)
RVA RNA STL QL NAA+NON-PROBE: NOT DETECTED
S ENT+BONG DNA STL QL NAA+NON-PROBE: NOT DETECTED
SAPO I+II+IV+V RNA STL QL NAA+NON-PROBE: NOT DETECTED
SHIGELLA SP+EIEC IPAH ST NAA+NON-PROBE: NOT DETECTED
SODIUM SERPL-SCNC: 117 MEQ/L (ref 135–144)
SODIUM SERPL-SCNC: 118 MEQ/L (ref 135–144)
SODIUM SERPL-SCNC: 120 MEQ/L (ref 135–144)
SODIUM SERPL-SCNC: 122 MEQ/L (ref 135–144)
SODIUM SERPL-SCNC: 123 MEQ/L (ref 135–144)
V CHOL+PARA+VUL DNA STL QL NAA+NON-PROBE: NOT DETECTED
V CHOLERAE DNA STL QL NAA+NON-PROBE: NOT DETECTED
WBC # BLD AUTO: 4.7 K/UL (ref 4.8–10.8)
Y ENTEROCOL DNA STL QL NAA+NON-PROBE: NOT DETECTED

## 2023-11-25 PROCEDURE — 97161 PT EVAL LOW COMPLEX 20 MIN: CPT

## 2023-11-25 PROCEDURE — 36415 COLL VENOUS BLD VENIPUNCTURE: CPT

## 2023-11-25 PROCEDURE — 87209 SMEAR COMPLEX STAIN: CPT

## 2023-11-25 PROCEDURE — 2580000003 HC RX 258: Performed by: INTERNAL MEDICINE

## 2023-11-25 PROCEDURE — 6360000002 HC RX W HCPCS: Performed by: INTERNAL MEDICINE

## 2023-11-25 PROCEDURE — 83930 ASSAY OF BLOOD OSMOLALITY: CPT

## 2023-11-25 PROCEDURE — 83735 ASSAY OF MAGNESIUM: CPT

## 2023-11-25 PROCEDURE — 71045 X-RAY EXAM CHEST 1 VIEW: CPT

## 2023-11-25 PROCEDURE — 80048 BASIC METABOLIC PNL TOTAL CA: CPT

## 2023-11-25 PROCEDURE — 2060000000 HC ICU INTERMEDIATE R&B

## 2023-11-25 PROCEDURE — 97166 OT EVAL MOD COMPLEX 45 MIN: CPT

## 2023-11-25 PROCEDURE — 6370000000 HC RX 637 (ALT 250 FOR IP): Performed by: INTERNAL MEDICINE

## 2023-11-25 PROCEDURE — 99255 IP/OBS CONSLTJ NEW/EST HI 80: CPT | Performed by: INTERNAL MEDICINE

## 2023-11-25 PROCEDURE — 87177 OVA AND PARASITES SMEARS: CPT

## 2023-11-25 PROCEDURE — 87507 IADNA-DNA/RNA PROBE TQ 12-25: CPT

## 2023-11-25 PROCEDURE — 85025 COMPLETE CBC W/AUTO DIFF WBC: CPT

## 2023-11-25 RX ORDER — POTASSIUM CHLORIDE 7.45 MG/ML
10 INJECTION INTRAVENOUS
Status: COMPLETED | OUTPATIENT
Start: 2023-11-25 | End: 2023-11-25

## 2023-11-25 RX ORDER — ONDANSETRON 4 MG/1
TABLET, FILM COATED ORAL
COMMUNITY
Start: 2023-09-27 | End: 2024-01-18

## 2023-11-25 RX ADMIN — CHLORDIAZEPOXIDE HYDROCHLORIDE 10 MG: 5 CAPSULE ORAL at 08:22

## 2023-11-25 RX ADMIN — POTASSIUM CHLORIDE 10 MEQ: 10 INJECTION, SOLUTION INTRAVENOUS at 01:24

## 2023-11-25 RX ADMIN — ENOXAPARIN SODIUM 40 MG: 100 INJECTION SUBCUTANEOUS at 22:18

## 2023-11-25 RX ADMIN — POTASSIUM CHLORIDE 10 MEQ: 10 INJECTION, SOLUTION INTRAVENOUS at 02:30

## 2023-11-25 RX ADMIN — CHLORDIAZEPOXIDE HYDROCHLORIDE 10 MG: 5 CAPSULE ORAL at 14:24

## 2023-11-25 RX ADMIN — POTASSIUM BICARBONATE 40 MEQ: 782 TABLET, EFFERVESCENT ORAL at 09:15

## 2023-11-25 RX ADMIN — SODIUM CHLORIDE: 9 INJECTION, SOLUTION INTRAVENOUS at 01:20

## 2023-11-25 RX ADMIN — CHLORDIAZEPOXIDE HYDROCHLORIDE 10 MG: 5 CAPSULE ORAL at 22:18

## 2023-11-25 RX ADMIN — ONDANSETRON 4 MG: 2 INJECTION INTRAMUSCULAR; INTRAVENOUS at 03:22

## 2023-11-25 RX ADMIN — Medication 10 ML: at 22:18

## 2023-11-25 RX ADMIN — THIAMINE HYDROCHLORIDE 100 MG: 100 INJECTION, SOLUTION INTRAMUSCULAR; INTRAVENOUS at 22:19

## 2023-11-25 ASSESSMENT — PAIN SCALES - GENERAL
PAINLEVEL_OUTOF10: 0

## 2023-11-25 NOTE — FLOWSHEET NOTE
Patient arrived to [address] from ICU via bed. Pt anderson alert and oriented x3. Pt denies pain. SR 80 on telemetry. NS infusing. Call light in pts reach. Bed alarm in use.

## 2023-11-25 NOTE — PLAN OF CARE
Problem: Discharge Planning  Goal: Discharge to home or other facility with appropriate resources  Outcome: Progressing  Flowsheets  Taken 11/24/2023 2032  Discharge to home or other facility with appropriate resources:   Identify barriers to discharge with patient and caregiver   Identify discharge learning needs (meds, wound care, etc)   Refer to discharge planning if patient needs post-hospital services based on physician order or complex needs related to functional status, cognitive ability or social support system   Arrange for needed discharge resources and transportation as appropriate   Arrange for interpreters to assist at discharge as needed  Taken 11/24/2023 2000  Discharge to home or other facility with appropriate resources:   Identify barriers to discharge with patient and caregiver   Arrange for needed discharge resources and transportation as appropriate   Identify discharge learning needs (meds, wound care, etc)   Arrange for interpreters to assist at discharge as needed   Refer to discharge planning if patient needs post-hospital services based on physician order or complex needs related to functional status, cognitive ability or social support system     Problem: Safety - Adult  Goal: Free from fall injury  Outcome: Progressing

## 2023-11-25 NOTE — CARE COORDINATION
Case Management Assessment  Initial Evaluation    Date/Time of Evaluation: 11/24/2023 8:43 PM  Assessment Completed by: Anat Peña RN    If patient is discharged prior to next notation, then this note serves as note for discharge by case management. Patient Name: Devon Tejada                   YOB: 1970  Diagnosis: Alcohol abuse [F10.10]  Hyponatremia [E87.1]                   Date / Time: 11/24/2023  4:56 PM    Patient Admission Status: Inpatient   Readmission Risk (Low < 19, Mod (19-27), High > 27): Readmission Risk Score: 6.9    Current PCP: Junaid Landa MD  PCP verified by CM? Yes    Chart Reviewed: Yes      History Provided by: Patient  Patient Orientation: Alert and Oriented, Person, Place, Situation, Self    Patient Cognition: Alert    Hospitalization in the last 30 days (Readmission):  No    If yes, Readmission Assessment in CM Navigator will be completed. Advance Directives:      Code Status: Full Code   Patient's Primary Decision Maker is: Named in Scanned ACP Document      Discharge Planning:    Patient lives with: Alone Type of Home: House  Primary Care Giver: Self  Patient Support Systems include: Parent (Simultaneous filing. User may not have seen previous data.)   Current Financial resources: [employer] ([address] [place] [address])  Current community resources: None  Current services prior to admission: (P) None            Current DME:              Type of Home Care services:  None    ADLS  Prior functional level: Independent in ADLs/IADLs  Current functional level: Independent in ADLs/IADLs    PT AM-PAC:   /24  OT AM-PAC:   /24    Family can provide assistance at DC: Yes  Would you like Case Management to discuss the discharge plan with any other family members/significant others, and if so, who?  Yes (mom Divina Venegas)  Plans to Return to Present Housing: Yes  Other Identified Issues/Barriers to RETURNING to current housing: none  Potential Assistance needed at discharge: (P) Other (Comment) (pt denied

## 2023-11-25 NOTE — ACP (ADVANCE CARE PLANNING)
Advance Care Planning     Advance Care Planning Activator (Inpatient)  Conversation Note      Date of ACP Conversation: 11/24/2023     Conversation Conducted with: Patient with Decision Making Capacity    ACP Activator: Arleth Graves RN        Health Care Decision Maker:     Current Designated Health Care Decision Maker:     Primary Decision Maker: Sarah Lyles [facility] - [phone]    Care Preferences    Ventilation: \"If you were in your present state of health and suddenly became very ill and were unable to breathe on your own, what would your preference be about the use of a ventilator (breathing machine) if it were available to you? \"      Would the patient desire the use of ventilator (breathing machine)?: yes    \"If your health worsens and it becomes clear that your chance of recovery is unlikely, what would your preference be about the use of a ventilator (breathing machine) if it were available to you? \"     Would the patient desire the use of ventilator (breathing machine)?: states \"depends on the prognosis. \"      Resuscitation  \"CPR works best to restart the heart when there is a sudden event, like a heart attack, in someone who is otherwise healthy. Unfortunately, CPR does not typically restart the heart for people who have serious health conditions or who are very sick. \"    \"In the event your heart stopped as a result of an underlying serious health condition, would you want attempts to be made to restart your heart (answer \"yes\" for attempt to resuscitate) or would you prefer a natural death (answer \"no\" for do not attempt to resuscitate)? \" yes       [x] Yes   [] No   Educated Patient / Elizabeth Ana Rosa regarding differences between Advance Directives and portable DNR orders.     Length of ACP Conversation in minutes:  10    Conversation Outcomes:  ACP discussion completed    Follow-up plan:    [] Schedule follow-up conversation to continue planning  [] Referred individual to Provider for additional

## 2023-11-25 NOTE — FLOWSHEET NOTE
Shift summary    0730 assumed care of pt. Resting in bed. No needs at this time    0800 assessment complete see flowsheet. A&Ox4 denies pain CIWA score 1. Pt still c/o intermittent diarrhea at times, no nausea or vomiting, requesting to eat. MP SR. No edema PPP. Up x 1 assist- weak/wobbly. Still lightheaded at times. Repeat Na 120. Remains on NS. Diet ordered. Instructed pt on fluid restriction. K replaced per PRN order. Mom at bedside and updated. Awaiting floor bed. Electronically signed by Adán Mabry RN on 11/25/2023 at 9:42 AM    Up to bathroom numerous times throughout shift, + diarrhea. Spoke with hospitalist, stool specimen ordered and sent. Less wobbly on feet compared to this AM. Fluid restriction maintained 1200 cc. CIWA score remains at 1. No needs per pt. Electronically signed by Adán Mabry RN on 11/25/2023 at 1:40 PM    1605 Diarrhea eased up throughout shift, pt still c/o abd discomfort/cramping. CIWA remains at 1 no PRNs given this shift, doing well on librium. .    Report to Andressa Felix RN on 1W. Pt placed on tele monitor.  Electronically signed by Adán Mabry RN on 11/25/2023 at 4:16 PM

## 2023-11-25 NOTE — ED NOTES
Report received from [facility], [address] [place] [address].      Imer Junior RN  11/24/23 1944

## 2023-11-25 NOTE — FLOWSHEET NOTE
Na level is 123, IV fluids stopped as ordered, lads changed to q 12 hours now. Pt assisted up to the bathroom, pt having loose stools.

## 2023-11-26 LAB
ALBUMIN SERPL-MCNC: 3.6 G/DL (ref 3.5–4.6)
ALP SERPL-CCNC: 69 U/L (ref 40–130)
ALT SERPL-CCNC: 130 U/L (ref 0–33)
ANION GAP SERPL CALCULATED.3IONS-SCNC: 13 MEQ/L (ref 9–15)
AST SERPL-CCNC: 102 U/L (ref 0–35)
BASOPHILS # BLD: 0 K/UL (ref 0–0.2)
BASOPHILS NFR BLD: 0.6 %
BILIRUB SERPL-MCNC: 0.8 MG/DL (ref 0.2–0.7)
BUN SERPL-MCNC: 5 MG/DL (ref 6–20)
CALCIUM SERPL-MCNC: 8.7 MG/DL (ref 8.5–9.9)
CHLORIDE SERPL-SCNC: 88 MEQ/L (ref 95–107)
CO2 SERPL-SCNC: 26 MEQ/L (ref 20–31)
CREAT SERPL-MCNC: 0.43 MG/DL (ref 0.5–0.9)
EOSINOPHIL # BLD: 0 K/UL (ref 0–0.7)
EOSINOPHIL NFR BLD: 0.8 %
ERYTHROCYTE [DISTWIDTH] IN BLOOD BY AUTOMATED COUNT: 11.9 % (ref 11.5–14.5)
GLOBULIN SER CALC-MCNC: 1.6 G/DL (ref 2.3–3.5)
GLUCOSE SERPL-MCNC: 92 MG/DL (ref 70–99)
HAV IGM SER IA-ACNC: NONREACTIVE
HCT VFR BLD AUTO: 30.6 % (ref 37–47)
HEPATITIS B CORE IGM ANTIBODY: NONREACTIVE
HEPATITIS B SURF AG,XHBAGS: NONREACTIVE
HEPATITIS C ANTIBODY: NONREACTIVE
HGB BLD-MCNC: 11.3 G/DL (ref 12–16)
LYMPHOCYTES # BLD: 1.1 K/UL (ref 1–4.8)
LYMPHOCYTES NFR BLD: 31 %
MAGNESIUM SERPL-MCNC: 1.7 MG/DL (ref 1.7–2.4)
MCH RBC QN AUTO: 35.5 PG (ref 27–31.3)
MCHC RBC AUTO-ENTMCNC: 36.9 % (ref 33–37)
MCV RBC AUTO: 96.2 FL (ref 79.4–94.8)
MONOCYTES # BLD: 0.4 K/UL (ref 0.2–0.8)
MONOCYTES NFR BLD: 12.4 %
NEUTROPHILS # BLD: 1.9 K/UL (ref 1.4–6.5)
NEUTS SEG NFR BLD: 54.6 %
PLATELET # BLD AUTO: 142 K/UL (ref 130–400)
POTASSIUM SERPL-SCNC: 3.1 MEQ/L (ref 3.4–4.9)
PROT SERPL-MCNC: 5.2 G/DL (ref 6.3–8)
RBC # BLD AUTO: 3.18 M/UL (ref 4.2–5.4)
SODIUM SERPL-SCNC: 127 MEQ/L (ref 135–144)
WBC # BLD AUTO: 3.6 K/UL (ref 4.8–10.8)

## 2023-11-26 PROCEDURE — 99232 SBSQ HOSP IP/OBS MODERATE 35: CPT | Performed by: INTERNAL MEDICINE

## 2023-11-26 PROCEDURE — 80053 COMPREHEN METABOLIC PANEL: CPT

## 2023-11-26 PROCEDURE — 2060000000 HC ICU INTERMEDIATE R&B

## 2023-11-26 PROCEDURE — 6370000000 HC RX 637 (ALT 250 FOR IP): Performed by: INTERNAL MEDICINE

## 2023-11-26 PROCEDURE — 83735 ASSAY OF MAGNESIUM: CPT

## 2023-11-26 PROCEDURE — 2580000003 HC RX 258: Performed by: INTERNAL MEDICINE

## 2023-11-26 PROCEDURE — 6360000002 HC RX W HCPCS: Performed by: INTERNAL MEDICINE

## 2023-11-26 PROCEDURE — 85025 COMPLETE CBC W/AUTO DIFF WBC: CPT

## 2023-11-26 PROCEDURE — 36415 COLL VENOUS BLD VENIPUNCTURE: CPT

## 2023-11-26 PROCEDURE — 80074 ACUTE HEPATITIS PANEL: CPT

## 2023-11-26 RX ORDER — POTASSIUM CHLORIDE 20 MEQ/1
20 TABLET, EXTENDED RELEASE ORAL ONCE
Status: COMPLETED | OUTPATIENT
Start: 2023-11-26 | End: 2023-11-26

## 2023-11-26 RX ORDER — CHLORDIAZEPOXIDE HYDROCHLORIDE 5 MG/1
5 CAPSULE, GELATIN COATED ORAL 3 TIMES DAILY
Status: DISCONTINUED | OUTPATIENT
Start: 2023-11-26 | End: 2023-11-26

## 2023-11-26 RX ORDER — PANTOPRAZOLE SODIUM 40 MG/1
40 TABLET, DELAYED RELEASE ORAL
Status: DISCONTINUED | OUTPATIENT
Start: 2023-11-26 | End: 2023-11-28 | Stop reason: HOSPADM

## 2023-11-26 RX ORDER — LANOLIN ALCOHOL/MO/W.PET/CERES
100 CREAM (GRAM) TOPICAL DAILY
Status: DISCONTINUED | OUTPATIENT
Start: 2023-11-26 | End: 2023-11-28 | Stop reason: HOSPADM

## 2023-11-26 RX ORDER — CHLORDIAZEPOXIDE HYDROCHLORIDE 5 MG/1
5 CAPSULE, GELATIN COATED ORAL 3 TIMES DAILY
Status: COMPLETED | OUTPATIENT
Start: 2023-11-26 | End: 2023-11-27

## 2023-11-26 RX ADMIN — Medication 10 ML: at 20:39

## 2023-11-26 RX ADMIN — CHLORDIAZEPOXIDE HYDROCHLORIDE 5 MG: 5 CAPSULE ORAL at 13:54

## 2023-11-26 RX ADMIN — CHLORDIAZEPOXIDE HYDROCHLORIDE 10 MG: 5 CAPSULE ORAL at 08:40

## 2023-11-26 RX ADMIN — Medication 100 MG: at 16:14

## 2023-11-26 RX ADMIN — Medication 10 ML: at 10:22

## 2023-11-26 RX ADMIN — PANTOPRAZOLE SODIUM 40 MG: 40 TABLET, DELAYED RELEASE ORAL at 10:21

## 2023-11-26 RX ADMIN — POTASSIUM CHLORIDE 20 MEQ: 1500 TABLET, EXTENDED RELEASE ORAL at 16:15

## 2023-11-26 RX ADMIN — CHLORDIAZEPOXIDE HYDROCHLORIDE 5 MG: 5 CAPSULE ORAL at 20:39

## 2023-11-26 RX ADMIN — POTASSIUM BICARBONATE 40 MEQ: 782 TABLET, EFFERVESCENT ORAL at 08:45

## 2023-11-26 RX ADMIN — ENOXAPARIN SODIUM 40 MG: 100 INJECTION SUBCUTANEOUS at 08:40

## 2023-11-26 ASSESSMENT — PAIN SCALES - GENERAL: PAINLEVEL_OUTOF10: 0

## 2023-11-27 LAB
ANION GAP SERPL CALCULATED.3IONS-SCNC: 11 MEQ/L (ref 9–15)
BASOPHILS # BLD: 0 K/UL (ref 0–0.2)
BASOPHILS NFR BLD: 0.9 %
BUN SERPL-MCNC: 4 MG/DL (ref 6–20)
CALCIUM SERPL-MCNC: 8.9 MG/DL (ref 8.5–9.9)
CHLORIDE SERPL-SCNC: 91 MEQ/L (ref 95–107)
CO2 SERPL-SCNC: 27 MEQ/L (ref 20–31)
CREAT SERPL-MCNC: 0.46 MG/DL (ref 0.5–0.9)
EOSINOPHIL # BLD: 0.1 K/UL (ref 0–0.7)
EOSINOPHIL NFR BLD: 1.7 %
ERYTHROCYTE [DISTWIDTH] IN BLOOD BY AUTOMATED COUNT: 12.2 % (ref 11.5–14.5)
GLUCOSE SERPL-MCNC: 110 MG/DL (ref 70–99)
HCT VFR BLD AUTO: 31.6 % (ref 37–47)
HGB BLD-MCNC: 11.5 G/DL (ref 12–16)
LYMPHOCYTES # BLD: 2.2 K/UL (ref 1–4.8)
LYMPHOCYTES NFR BLD: 46.9 %
MCH RBC QN AUTO: 35.6 PG (ref 27–31.3)
MCHC RBC AUTO-ENTMCNC: 36.4 % (ref 33–37)
MCV RBC AUTO: 97.8 FL (ref 79.4–94.8)
MONOCYTES # BLD: 0.7 K/UL (ref 0.2–0.8)
MONOCYTES NFR BLD: 14.1 %
NEUTROPHILS # BLD: 1.7 K/UL (ref 1.4–6.5)
NEUTS SEG NFR BLD: 35.3 %
PLATELET # BLD AUTO: 163 K/UL (ref 130–400)
POTASSIUM SERPL-SCNC: 3.4 MEQ/L (ref 3.4–4.9)
RBC # BLD AUTO: 3.23 M/UL (ref 4.2–5.4)
SERUM OSMOLALITY: 231 MOSM/KG (ref 275–295)
SERUM OSMOLALITY: 239 MOSM/KG (ref 275–295)
SODIUM SERPL-SCNC: 129 MEQ/L (ref 135–144)
WBC # BLD AUTO: 4.7 K/UL (ref 4.8–10.8)

## 2023-11-27 PROCEDURE — 6360000002 HC RX W HCPCS: Performed by: INTERNAL MEDICINE

## 2023-11-27 PROCEDURE — 2580000003 HC RX 258: Performed by: INTERNAL MEDICINE

## 2023-11-27 PROCEDURE — 2060000000 HC ICU INTERMEDIATE R&B

## 2023-11-27 PROCEDURE — 6370000000 HC RX 637 (ALT 250 FOR IP): Performed by: INTERNAL MEDICINE

## 2023-11-27 PROCEDURE — 80048 BASIC METABOLIC PNL TOTAL CA: CPT

## 2023-11-27 PROCEDURE — 36415 COLL VENOUS BLD VENIPUNCTURE: CPT

## 2023-11-27 PROCEDURE — 97535 SELF CARE MNGMENT TRAINING: CPT

## 2023-11-27 PROCEDURE — 99232 SBSQ HOSP IP/OBS MODERATE 35: CPT | Performed by: INTERNAL MEDICINE

## 2023-11-27 PROCEDURE — 97116 GAIT TRAINING THERAPY: CPT

## 2023-11-27 PROCEDURE — 85025 COMPLETE CBC W/AUTO DIFF WBC: CPT

## 2023-11-27 RX ADMIN — PANTOPRAZOLE SODIUM 40 MG: 40 TABLET, DELAYED RELEASE ORAL at 06:02

## 2023-11-27 RX ADMIN — Medication 10 ML: at 21:57

## 2023-11-27 RX ADMIN — Medication 100 MG: at 09:01

## 2023-11-27 RX ADMIN — ENOXAPARIN SODIUM 40 MG: 100 INJECTION SUBCUTANEOUS at 09:01

## 2023-11-27 RX ADMIN — Medication 10 ML: at 09:03

## 2023-11-27 RX ADMIN — CHLORDIAZEPOXIDE HYDROCHLORIDE 5 MG: 5 CAPSULE ORAL at 09:01

## 2023-11-27 ASSESSMENT — PAIN SCALES - GENERAL
PAINLEVEL_OUTOF10: 0
PAINLEVEL_OUTOF10: 0

## 2023-11-27 NOTE — CARE COORDINATION
SPOKE WITH MARY AT [place], UPDATES GIVEN AS REQUESTED. [facility] REQUESTING [facility] SUMMARY AND AVS BE FAXED UPON DC [phone]. AWAITING PT/OT EVALS.

## 2023-11-28 ENCOUNTER — APPOINTMENT (OUTPATIENT)
Dept: ULTRASOUND IMAGING | Age: 53
DRG: 641 | End: 2023-11-28
Payer: OTHER GOVERNMENT

## 2023-11-28 VITALS
BODY MASS INDEX: 24.39 KG/M2 | HEIGHT: 68 IN | RESPIRATION RATE: 18 BRPM | HEART RATE: 82 BPM | WEIGHT: 160.94 LBS | DIASTOLIC BLOOD PRESSURE: 85 MMHG | SYSTOLIC BLOOD PRESSURE: 125 MMHG | TEMPERATURE: 98 F | OXYGEN SATURATION: 100 %

## 2023-11-28 LAB
ANION GAP SERPL CALCULATED.3IONS-SCNC: 8 MEQ/L (ref 9–15)
BASOPHILS # BLD: 0 K/UL (ref 0–0.2)
BASOPHILS NFR BLD: 0.7 %
BUN SERPL-MCNC: 7 MG/DL (ref 6–20)
CALCIUM SERPL-MCNC: 9.2 MG/DL (ref 8.5–9.9)
CHLORIDE SERPL-SCNC: 95 MEQ/L (ref 95–107)
CO2 SERPL-SCNC: 28 MEQ/L (ref 20–31)
CREAT SERPL-MCNC: 0.46 MG/DL (ref 0.5–0.9)
EOSINOPHIL # BLD: 0.1 K/UL (ref 0–0.7)
EOSINOPHIL NFR BLD: 2.1 %
ERYTHROCYTE [DISTWIDTH] IN BLOOD BY AUTOMATED COUNT: 12.4 % (ref 11.5–14.5)
GLUCOSE SERPL-MCNC: 119 MG/DL (ref 70–99)
HCT VFR BLD AUTO: 29.8 % (ref 37–47)
HGB BLD-MCNC: 10.6 G/DL (ref 12–16)
INTERPRETATION: NEGATIVE
LYMPHOCYTES # BLD: 1.4 K/UL (ref 1–4.8)
LYMPHOCYTES NFR BLD: 33.5 %
MCH RBC QN AUTO: 35.7 PG (ref 27–31.3)
MCHC RBC AUTO-ENTMCNC: 35.6 % (ref 33–37)
MCV RBC AUTO: 100.3 FL (ref 79.4–94.8)
MONOCYTES # BLD: 0.6 K/UL (ref 0.2–0.8)
MONOCYTES NFR BLD: 13.8 %
NEUTROPHILS # BLD: 2.1 K/UL (ref 1.4–6.5)
NEUTS SEG NFR BLD: 48.9 %
PLATELET # BLD AUTO: 194 K/UL (ref 130–400)
POTASSIUM SERPL-SCNC: 3.5 MEQ/L (ref 3.4–4.9)
RBC # BLD AUTO: 2.97 M/UL (ref 4.2–5.4)
SODIUM SERPL-SCNC: 131 MEQ/L (ref 135–144)
WBC # BLD AUTO: 4.2 K/UL (ref 4.8–10.8)

## 2023-11-28 PROCEDURE — 36415 COLL VENOUS BLD VENIPUNCTURE: CPT

## 2023-11-28 PROCEDURE — 97116 GAIT TRAINING THERAPY: CPT

## 2023-11-28 PROCEDURE — 6370000000 HC RX 637 (ALT 250 FOR IP): Performed by: INTERNAL MEDICINE

## 2023-11-28 PROCEDURE — 6360000002 HC RX W HCPCS: Performed by: INTERNAL MEDICINE

## 2023-11-28 PROCEDURE — 76705 ECHO EXAM OF ABDOMEN: CPT

## 2023-11-28 PROCEDURE — 99232 SBSQ HOSP IP/OBS MODERATE 35: CPT | Performed by: INTERNAL MEDICINE

## 2023-11-28 PROCEDURE — 85025 COMPLETE CBC W/AUTO DIFF WBC: CPT

## 2023-11-28 PROCEDURE — 80048 BASIC METABOLIC PNL TOTAL CA: CPT

## 2023-11-28 RX ADMIN — ENOXAPARIN SODIUM 40 MG: 100 INJECTION SUBCUTANEOUS at 14:46

## 2023-11-28 RX ADMIN — Medication 100 MG: at 14:46

## 2023-11-28 RX ADMIN — PANTOPRAZOLE SODIUM 40 MG: 40 TABLET, DELAYED RELEASE ORAL at 06:55

## 2023-11-28 ASSESSMENT — PAIN SCALES - GENERAL
PAINLEVEL_OUTOF10: 0

## 2023-11-28 NOTE — PLAN OF CARE
Problem: Discharge Planning  Goal: Discharge to home or other facility with appropriate resources  Outcome: Progressing  Flowsheets (Taken 11/27/2023 1920)  Discharge to home or other facility with appropriate resources:   Identify barriers to discharge with patient and caregiver   Arrange for needed discharge resources and transportation as appropriate   Identify discharge learning needs (meds, wound care, etc)     Problem: Safety - Adult  Goal: Free from fall injury  Outcome: Progressing     Problem: Pain  Goal: Verbalizes/displays adequate comfort level or baseline comfort level  Outcome: Progressing  Flowsheets (Taken 11/27/2023 1920)  Verbalizes/displays adequate comfort level or baseline comfort level: Encourage patient to monitor pain and request assistance

## 2023-11-28 NOTE — CARE COORDINATION
MET WITH PATIENT, PLAN FOR POSSIBLE DC HOME TODAY ONCE CLEARED BY RUTHIE ALEX FORM REVIEWED, PT SIGNED AND COPY GIVEN. PT DENIES ANY HOME NEEDS. LETS GET REAL INFO PLACED IN PTS FOLDER.

## 2023-11-28 NOTE — PLAN OF CARE
Problem: Discharge Planning  Goal: Discharge to home or other facility with appropriate resources  11/28/2023 1749 by Valentine Arnold RN  Outcome: Adequate for Discharge  11/28/2023 1749 by Valentine Arnold RN  Outcome: Progressing  Flowsheets (Taken 11/28/2023 0800)  Discharge to home or other facility with appropriate resources: Identify barriers to discharge with patient and caregiver  11/28/2023 0436 by Abiodun Florence RN  Outcome: Progressing  Flowsheets (Taken 11/27/2023 1920)  Discharge to home or other facility with appropriate resources:   Identify barriers to discharge with patient and caregiver   Arrange for needed discharge resources and transportation as appropriate   Identify discharge learning needs (meds, wound care, etc)

## 2023-12-02 ENCOUNTER — OFFICE VISIT (OUTPATIENT)
Dept: FAMILY MEDICINE CLINIC | Age: 53
End: 2023-12-02
Payer: OTHER GOVERNMENT

## 2023-12-02 VITALS
RESPIRATION RATE: 12 BRPM | BODY MASS INDEX: 24.39 KG/M2 | DIASTOLIC BLOOD PRESSURE: 78 MMHG | WEIGHT: 160.94 LBS | HEIGHT: 68 IN | OXYGEN SATURATION: 98 % | TEMPERATURE: 97.8 F | SYSTOLIC BLOOD PRESSURE: 124 MMHG | HEART RATE: 96 BPM

## 2023-12-02 DIAGNOSIS — N30.00 ACUTE CYSTITIS WITHOUT HEMATURIA: Primary | ICD-10-CM

## 2023-12-02 DIAGNOSIS — R39.15 URGENCY OF URINATION: ICD-10-CM

## 2023-12-02 LAB
BILIRUBIN, POC: NORMAL
BLOOD URINE, POC: NORMAL
CLARITY, POC: CLEAR
COLOR, POC: NORMAL
GLUCOSE URINE, POC: NORMAL
KETONES, POC: NORMAL
LEUKOCYTE EST, POC: NORMAL
NITRITE, POC: NORMAL
PH, POC: 6
PROTEIN, POC: NORMAL
SPECIFIC GRAVITY, POC: 1
UROBILINOGEN, POC: NORMAL

## 2023-12-02 PROCEDURE — 99213 OFFICE O/P EST LOW 20 MIN: CPT | Performed by: NURSE PRACTITIONER

## 2023-12-02 PROCEDURE — 3074F SYST BP LT 130 MM HG: CPT | Performed by: NURSE PRACTITIONER

## 2023-12-02 PROCEDURE — 3078F DIAST BP <80 MM HG: CPT | Performed by: NURSE PRACTITIONER

## 2023-12-02 PROCEDURE — 81003 URINALYSIS AUTO W/O SCOPE: CPT | Performed by: NURSE PRACTITIONER

## 2023-12-02 RX ORDER — SULFAMETHOXAZOLE AND TRIMETHOPRIM 800; 160 MG/1; MG/1
1 TABLET ORAL 2 TIMES DAILY
Qty: 14 TABLET | Refills: 0 | Status: SHIPPED | OUTPATIENT
Start: 2023-12-02 | End: 2023-12-09

## 2023-12-02 SDOH — ECONOMIC STABILITY: FOOD INSECURITY: WITHIN THE PAST 12 MONTHS, THE FOOD YOU BOUGHT JUST DIDN'T LAST AND YOU DIDN'T HAVE MONEY TO GET MORE.: NEVER TRUE

## 2023-12-02 SDOH — ECONOMIC STABILITY: INCOME INSECURITY: HOW HARD IS IT FOR YOU TO PAY FOR THE VERY BASICS LIKE FOOD, HOUSING, MEDICAL CARE, AND HEATING?: NOT HARD AT ALL

## 2023-12-02 SDOH — ECONOMIC STABILITY: FOOD INSECURITY: WITHIN THE PAST 12 MONTHS, YOU WORRIED THAT YOUR FOOD WOULD RUN OUT BEFORE YOU GOT MONEY TO BUY MORE.: NEVER TRUE

## 2023-12-02 SDOH — ECONOMIC STABILITY: HOUSING INSECURITY
IN THE LAST 12 MONTHS, WAS THERE A TIME WHEN YOU DID NOT HAVE A STEADY PLACE TO SLEEP OR SLEPT IN A SHELTER (INCLUDING NOW)?: NO

## 2023-12-02 ASSESSMENT — PATIENT HEALTH QUESTIONNAIRE - PHQ9
SUM OF ALL RESPONSES TO PHQ QUESTIONS 1-9: 0
1. LITTLE INTEREST OR PLEASURE IN DOING THINGS: 0
SUM OF ALL RESPONSES TO PHQ QUESTIONS 1-9: 0
SUM OF ALL RESPONSES TO PHQ9 QUESTIONS 1 & 2: 0
2. FEELING DOWN, DEPRESSED OR HOPELESS: 0

## 2023-12-02 ASSESSMENT — ENCOUNTER SYMPTOMS
NAUSEA: 0
ABDOMINAL PAIN: 0
DIARRHEA: 0
ABDOMINAL DISTENTION: 0
VOMITING: 0

## 2023-12-02 NOTE — PROGRESS NOTES
Subjective:      Patient ID: Ketan Sutton is a 52 y.o. female who presents today for:  Chief Complaint   Patient presents with    Urinary Tract Infection     Patient present today with possible UTI since Wednesday. HPI  Patient is her with c/o urinary sx for the last 2-3 days. Says she has been incontinent as well. Says she has been trying to push fluids. Says she just left the hospital for low sodium. Past Medical History:   Diagnosis Date    Anxiety     Heartburn     Hyperlipidemia     Hypertension     Sleep apnea      Past Surgical History:   Procedure Laterality Date    COLONOSCOPY N/A 1/12/2023    COLONOSCOPY DIAGNOSTIC performed by Leandro Regan MD at [facility]     TOTAL VAGINAL HYSTERECTOMY  12/09/2007    UPPER GASTROINTESTINAL ENDOSCOPY N/A 1/12/2023    EGD DIAGNOSTIC ONLY performed by Leandro Regan MD at [address] [place] [address] Dr History     Socioeconomic History    Marital status: Single     Spouse name: Not on file    Number of children: Not on file    Years of education: Not on file    Highest education level: Not on file   Occupational History    Not on file   Tobacco Use    Smoking status: Every Day     Packs/day: 0.50     Years: 35.00     Additional pack years: 0.00     Total pack years: 17.50     Types: Cigarettes     Start date: 24    Smokeless tobacco: Never   Substance and Sexual Activity    Alcohol use:  Yes     Alcohol/week: 4.0 standard drinks of alcohol     Types: 4 Shots of liquor per week    Drug use: Yes     Types: Marijuana [place] Peterson)    Sexual activity: Not Currently   Other Topics Concern    Not on file   Social History Narrative    Not on file     Social Determinants of Health     Financial Resource Strain: Low Risk  (12/2/2023)    Overall Financial Resource Strain (CARDIA)     Difficulty of Paying Living Expenses: Not hard at all   Food Insecurity: No Food Insecurity (12/2/2023)    Hunger Vital Sign     Worried About Running Out of Food in the

## 2023-12-03 LAB — BACTERIA UR CULT: NORMAL

## 2023-12-04 ENCOUNTER — OFFICE VISIT (OUTPATIENT)
Dept: FAMILY MEDICINE CLINIC | Age: 53
End: 2023-12-04

## 2023-12-04 VITALS
TEMPERATURE: 98.2 F | OXYGEN SATURATION: 100 % | DIASTOLIC BLOOD PRESSURE: 75 MMHG | RESPIRATION RATE: 18 BRPM | SYSTOLIC BLOOD PRESSURE: 111 MMHG | HEART RATE: 80 BPM

## 2023-12-04 VITALS
WEIGHT: 160 LBS | TEMPERATURE: 97.2 F | OXYGEN SATURATION: 99 % | HEART RATE: 72 BPM | DIASTOLIC BLOOD PRESSURE: 70 MMHG | HEIGHT: 67 IN | BODY MASS INDEX: 25.11 KG/M2 | SYSTOLIC BLOOD PRESSURE: 110 MMHG

## 2023-12-04 DIAGNOSIS — R79.89 ELEVATED LFTS: ICD-10-CM

## 2023-12-04 DIAGNOSIS — I10 HYPERTENSION, UNSPECIFIED TYPE: ICD-10-CM

## 2023-12-04 DIAGNOSIS — G47.33 OSA ON CPAP: ICD-10-CM

## 2023-12-04 DIAGNOSIS — K76.0 HEPATIC STEATOSIS: ICD-10-CM

## 2023-12-04 DIAGNOSIS — Z09 HOSPITAL DISCHARGE FOLLOW-UP: ICD-10-CM

## 2023-12-04 DIAGNOSIS — Z09 HOSPITAL DISCHARGE FOLLOW-UP: Primary | ICD-10-CM

## 2023-12-04 DIAGNOSIS — F10.20 ALCOHOLISM (HCC): ICD-10-CM

## 2023-12-04 DIAGNOSIS — R63.4 UNINTENTIONAL WEIGHT LOSS: ICD-10-CM

## 2023-12-04 DIAGNOSIS — R53.83 OTHER FATIGUE: ICD-10-CM

## 2023-12-04 DIAGNOSIS — N30.00 ACUTE CYSTITIS WITHOUT HEMATURIA: ICD-10-CM

## 2023-12-04 DIAGNOSIS — N39.41 URGE INCONTINENCE OF URINE: ICD-10-CM

## 2023-12-04 DIAGNOSIS — F17.200 SMOKER: ICD-10-CM

## 2023-12-04 DIAGNOSIS — E87.1 HYPONATREMIA: ICD-10-CM

## 2023-12-04 DIAGNOSIS — D53.9 MACROCYTIC ANEMIA: ICD-10-CM

## 2023-12-04 DIAGNOSIS — G47.9 SLEEPING DIFFICULTY: ICD-10-CM

## 2023-12-04 DIAGNOSIS — E87.6 HYPOKALEMIA: ICD-10-CM

## 2023-12-04 DIAGNOSIS — R53.1 GENERAL WEAKNESS: ICD-10-CM

## 2023-12-04 DIAGNOSIS — S69.92XA HAND INJURY, LEFT, INITIAL ENCOUNTER: ICD-10-CM

## 2023-12-04 LAB
ALBUMIN SERPL-MCNC: 4 G/DL (ref 3.5–4.6)
ALP SERPL-CCNC: 84 U/L (ref 40–130)
ALT SERPL-CCNC: 38 U/L (ref 0–33)
ANION GAP SERPL CALCULATED.3IONS-SCNC: 15 MEQ/L (ref 9–15)
AST SERPL-CCNC: 24 U/L (ref 0–35)
BACTERIA UR CULT: NORMAL
BASOPHILS # BLD: 0.1 K/UL (ref 0–0.2)
BASOPHILS NFR BLD: 1.1 %
BILIRUB SERPL-MCNC: 0.3 MG/DL (ref 0.2–0.7)
BUN SERPL-MCNC: 4 MG/DL (ref 6–20)
CALCIUM SERPL-MCNC: 9.5 MG/DL (ref 8.5–9.9)
CHLORIDE SERPL-SCNC: 102 MEQ/L (ref 95–107)
CO2 SERPL-SCNC: 22 MEQ/L (ref 20–31)
CREAT SERPL-MCNC: 0.7 MG/DL (ref 0.5–0.9)
CRP SERPL HS-MCNC: <3 MG/L (ref 0–5)
EOSINOPHIL # BLD: 0.1 K/UL (ref 0–0.7)
EOSINOPHIL NFR BLD: 1.6 %
ERYTHROCYTE [DISTWIDTH] IN BLOOD BY AUTOMATED COUNT: 13.3 % (ref 11.5–14.5)
ERYTHROCYTE [SEDIMENTATION RATE] IN BLOOD BY WESTERGREN METHOD: 28 MM (ref 0–30)
GLOBULIN SER CALC-MCNC: 2.4 G/DL (ref 2.3–3.5)
GLUCOSE SERPL-MCNC: 107 MG/DL (ref 70–99)
HCT VFR BLD AUTO: 35.6 % (ref 37–47)
HGB BLD-MCNC: 12 G/DL (ref 12–16)
LYMPHOCYTES # BLD: 1.4 K/UL (ref 1–4.8)
LYMPHOCYTES NFR BLD: 24.8 %
MCH RBC QN AUTO: 35.3 PG (ref 27–31.3)
MCHC RBC AUTO-ENTMCNC: 33.7 % (ref 33–37)
MCV RBC AUTO: 104.7 FL (ref 79.4–94.8)
MONOCYTES # BLD: 0.6 K/UL (ref 0.2–0.8)
MONOCYTES NFR BLD: 11 %
NEUTROPHILS # BLD: 3.5 K/UL (ref 1.4–6.5)
NEUTS SEG NFR BLD: 61.1 %
PLATELET # BLD AUTO: 480 K/UL (ref 130–400)
PLATELET BLD QL SMEAR: ABNORMAL
POTASSIUM SERPL-SCNC: 4 MEQ/L (ref 3.4–4.9)
PROT SERPL-MCNC: 6.4 G/DL (ref 6.3–8)
RBC # BLD AUTO: 3.4 M/UL (ref 4.2–5.4)
RBC MORPH BLD: NORMAL
SLIDE REVIEW: ABNORMAL
SODIUM SERPL-SCNC: 139 MEQ/L (ref 135–144)
TSH REFLEX: 2.5 UIU/ML (ref 0.44–3.86)
WBC # BLD AUTO: 5.6 K/UL (ref 4.8–10.8)

## 2023-12-04 PROCEDURE — 99283 EMERGENCY DEPT VISIT LOW MDM: CPT

## 2023-12-04 RX ORDER — UBIDECARENONE 75 MG
100 CAPSULE ORAL DAILY
Qty: 60 TABLET | Refills: 5 | Status: ON HOLD | OUTPATIENT
Start: 2023-12-04 | End: 2024-12-03

## 2023-12-04 RX ORDER — OXYBUTYNIN CHLORIDE 5 MG/1
5 TABLET, EXTENDED RELEASE ORAL DAILY
Qty: 30 TABLET | Refills: 1 | Status: ON HOLD | OUTPATIENT
Start: 2023-12-04

## 2023-12-04 RX ORDER — LANOLIN ALCOHOL/MO/W.PET/CERES
3 CREAM (GRAM) TOPICAL DAILY
Qty: 30 TABLET | Refills: 3 | Status: ON HOLD | OUTPATIENT
Start: 2023-12-04

## 2023-12-04 ASSESSMENT — ENCOUNTER SYMPTOMS
COUGH: 0
ABDOMINAL PAIN: 0
RHINORRHEA: 0
SHORTNESS OF BREATH: 0
VOMITING: 0
CONSTIPATION: 0
EYE PAIN: 0
DIARRHEA: 0
ABDOMINAL DISTENTION: 0
SORE THROAT: 0
NAUSEA: 1

## 2023-12-04 NOTE — PROGRESS NOTES
Azul Mai MD     oxybutynin 5 MG extended release tablet  Commonly known as: Ditropan XL  Take 1 tablet by mouth daily  Started by: Kassidy Niño MD     vitamin B-12 100 MCG tablet  Commonly known as: CYANOCOBALAMIN  Take 1 tablet by mouth daily  Started by: Kassidy Niño MD            CONTINUE taking these medications      atorvastatin 10 MG tablet  Commonly known as: LIPITOR     omeprazole 20 MG delayed release capsule  Commonly known as: PRILOSEC     ondansetron 4 MG tablet  Commonly known as: ZOFRAN     sodium-potassium-mag sulfate 17.5-3.13-1.6 GM/177ML Soln solution  Commonly known as: SUPREP  As directed     sulfamethoxazole-trimethoprim 800-160 MG per tablet  Commonly known as: BACTRIM DS;SEPTRA DS  Take 1 tablet by mouth 2 times daily for 7 days     telmisartan 40 MG tablet  Commonly known as: MICARDIS               Where to Get Your Medications        These medications were sent to [id number] - [place], [address] [place] [address] -  [phone]  [address] [place] [address]      Phone: [phone]   melatonin 3 MG Tabs tablet  oxybutynin 5 MG extended release tablet  vitamin B-12 100 MCG tablet          Medications marked \"taking\" at this time  Outpatient Medications Marked as Taking for the 12/4/23 encounter (Office Visit) with Kassidy Niño MD   Medication Sig Dispense Refill    melatonin 3 MG TABS tablet Take 1 tablet by mouth daily 30 tablet 3    vitamin B-12 (CYANOCOBALAMIN) 100 MCG tablet Take 1 tablet by mouth daily 60 tablet 5    oxybutynin (DITROPAN XL) 5 MG extended release tablet Take 1 tablet by mouth daily 30 tablet 1        Medications patient taking as of now reconciled against medications ordered at time of hospital discharge: Yes    Review of Systems   Constitutional:  Positive for fatigue. Negative for fever. HENT:  Negative for congestion, rhinorrhea and sore throat. Eyes:  Negative for pain.    Respiratory:  Negative for cough and

## 2023-12-05 ENCOUNTER — HOSPITAL ENCOUNTER (EMERGENCY)
Age: 53
Discharge: HOME | End: 2023-12-05
Payer: OTHER GOVERNMENT

## 2023-12-05 DIAGNOSIS — R53.83 OTHER FATIGUE: Primary | ICD-10-CM

## 2023-12-05 DIAGNOSIS — R53.1 GENERAL WEAKNESS: ICD-10-CM

## 2023-12-05 LAB
ALBUMIN SERPL-MCNC: 4.2 G/DL (ref 3.5–4.6)
ALP SERPL-CCNC: 81 U/L (ref 40–130)
ALT SERPL-CCNC: 35 U/L (ref 0–33)
ANION GAP SERPL CALCULATED.3IONS-SCNC: 14 MEQ/L (ref 9–15)
AST SERPL-CCNC: 17 U/L (ref 0–35)
BASOPHILS # BLD: 0.1 K/UL (ref 0–0.2)
BASOPHILS NFR BLD: 1 %
BILIRUB SERPL-MCNC: 0.3 MG/DL (ref 0.2–0.7)
BILIRUB UR QL STRIP: NEGATIVE
BUN SERPL-MCNC: 7 MG/DL (ref 6–20)
CALCIUM SERPL-MCNC: 9.5 MG/DL (ref 8.5–9.9)
CHLORIDE SERPL-SCNC: 101 MEQ/L (ref 95–107)
CLARITY UR: ABNORMAL
CO2 SERPL-SCNC: 23 MEQ/L (ref 20–31)
COLOR UR: YELLOW
CREAT SERPL-MCNC: 0.67 MG/DL (ref 0.5–0.9)
EOSINOPHIL # BLD: 0.1 K/UL (ref 0–0.7)
EOSINOPHIL NFR BLD: 1.6 %
ERYTHROCYTE [DISTWIDTH] IN BLOOD BY AUTOMATED COUNT: 13.2 % (ref 11.5–14.5)
GLOBULIN SER CALC-MCNC: 2.7 G/DL (ref 2.3–3.5)
GLUCOSE SERPL-MCNC: 132 MG/DL (ref 70–99)
GLUCOSE UR STRIP-MCNC: NEGATIVE MG/DL
HCT VFR BLD AUTO: 36.7 % (ref 37–47)
HGB BLD-MCNC: 12.3 G/DL (ref 12–16)
HGB UR QL STRIP: NEGATIVE
KETONES UR STRIP-MCNC: NEGATIVE MG/DL
LEUKOCYTE ESTERASE UR QL STRIP: NEGATIVE
LYMPHOCYTES # BLD: 1.6 K/UL (ref 1–4.8)
LYMPHOCYTES NFR BLD: 25.1 %
MCH RBC QN AUTO: 35.1 PG (ref 27–31.3)
MCHC RBC AUTO-ENTMCNC: 33.5 % (ref 33–37)
MCV RBC AUTO: 104.9 FL (ref 79.4–94.8)
MONOCYTES # BLD: 0.6 K/UL (ref 0.2–0.8)
MONOCYTES NFR BLD: 10.2 %
NEUTROPHILS # BLD: 3.9 K/UL (ref 1.4–6.5)
NEUTS SEG NFR BLD: 61.5 %
NITRITE UR QL STRIP: NEGATIVE
PH UR STRIP: 5.5 [PH] (ref 5–9)
PLATELET # BLD AUTO: 457 K/UL (ref 130–400)
POTASSIUM SERPL-SCNC: 4.1 MEQ/L (ref 3.4–4.9)
PROT SERPL-MCNC: 6.9 G/DL (ref 6.3–8)
PROT UR STRIP-MCNC: NEGATIVE MG/DL
RBC # BLD AUTO: 3.5 M/UL (ref 4.2–5.4)
SODIUM SERPL-SCNC: 138 MEQ/L (ref 135–144)
SP GR UR STRIP: 1.02 (ref 1–1.03)
UROBILINOGEN UR STRIP-ACNC: 0.2 E.U./DL
VITAMIN B-12: 550 PG/ML (ref 232–1245)
WBC # BLD AUTO: 6.3 K/UL (ref 4.8–10.8)

## 2023-12-05 PROCEDURE — 85025 COMPLETE CBC W/AUTO DIFF WBC: CPT

## 2023-12-05 PROCEDURE — 36415 COLL VENOUS BLD VENIPUNCTURE: CPT

## 2023-12-05 PROCEDURE — 80053 COMPREHEN METABOLIC PANEL: CPT

## 2023-12-05 PROCEDURE — 81003 URINALYSIS AUTO W/O SCOPE: CPT

## 2023-12-05 NOTE — ED PROVIDER NOTES
[facility] ED  Emergency Department Encounter  Emergency Medicine Attending     Pt Yash Stafford  MRN: [id number]  [address] 1970  Date of evaluation: 12/5/23  PCP:  Joie Woodward MD    [address]       Chief Complaint   Patient presents with    Tremors     Frequent falls, weakness. States legs won't work. HISTORY OF PRESENT ILLNESS  (Location/Symptom, Timing/Onset, Context/Setting, Quality, Duration, Modifying Factors, Severity.)      Chelle Adhikari is a 52 y.o. female who presents with generalized fatigue. Reporting freq falls. States she has been very weak and unable to ambulate without her walker for several weeks. Had history of hyponatremia. Would like her labs rechecked. NOTE: pt was seen during unplanned epic downtime and thus note may be somewhat limited    PAST MEDICAL / SURGICAL / SOCIAL / FAMILY HISTORY      has a past medical history of Anxiety, Heartburn, Hyperlipidemia, Hypertension, and Sleep apnea. Denies further past medical hx     has a past surgical history that includes Total vaginal hysterectomy (12/09/2007); Hernia repair (Right); Upper gastrointestinal endoscopy (N/A, 1/12/2023); and Colonoscopy (N/A, 1/12/2023). Denies further past surgical hx    Social History     Socioeconomic History    Marital status: Single     Spouse name: Not on file    Number of children: Not on file    Years of education: Not on file    Highest education level: Not on file   Occupational History    Not on file   Tobacco Use    Smoking status: Every Day     Packs/day: 0.50     Years: 35.00     Additional pack years: 0.00     Total pack years: 17.50     Types: Cigarettes     Start date: 24    Smokeless tobacco: Never   Substance and Sexual Activity    Alcohol use:  Yes     Alcohol/week: 4.0 standard drinks of alcohol     Types: 4 Shots of liquor per week    Drug use: Yes     Types: Marijuana Caesar Long)    Sexual activity: Not Currently   Other Topics Concern    Not on file   Social g/dL    Total Bilirubin 0.3 0.2 - 0.7 mg/dL    Alkaline Phosphatase 81 40 - 130 U/L    ALT 35 (H) 0 - 33 U/L    AST 17 0 - 35 U/L    Globulin 2.7 2.3 - 3.5 g/dL   CBC with Auto Differential   Result Value Ref Range    WBC 6.3 4.8 - 10.8 K/uL    RBC 3.50 (L) 4.20 - 5.40 M/uL    Hemoglobin 12.3 12.0 - 16.0 g/dL    Hematocrit 36.7 (L) 37.0 - 47.0 %    .9 (H) 79.4 - 94.8 fL    MCH 35.1 (H) 27.0 - 31.3 pg    MCHC 33.5 33.0 - 37.0 %    RDW 13.2 11.5 - 14.5 %    Platelets 425 (H) 139 - 400 K/uL    Neutrophils % 61.5 %    Lymphocytes % 25.1 %    Monocytes % 10.2 %    Eosinophils % 1.6 %    Basophils % 1.0 %    Neutrophils Absolute 3.9 1.4 - 6.5 K/uL    Lymphocytes Absolute 1.6 1.0 - 4.8 K/uL    Monocytes Absolute 0.6 0.2 - 0.8 K/uL    Eosinophils Absolute 0.1 0.0 - 0.7 K/uL    Basophils Absolute 0.1 0.0 - 0.2 K/uL   Urinalysis   Result Value Ref Range    Color, UA Yellow Straw/Yellow    Clarity, UA TURBID (A) Clear    Glucose, Ur Negative Negative mg/dL    Bilirubin Urine Negative Negative    Ketones, Urine Negative Negative mg/dL    Specific Gravity, UA 1.020 1.005 - 1.030    Blood, Urine Negative Negative    pH, UA 5.5 5.0 - 9.0    Protein, UA Negative Negative mg/dL    Urobilinogen, Urine 0.2 <2.0 E.U./dL    Nitrite, Urine Negative Negative    Leukocyte Esterase, Urine Negative Negative       RADIOLOGY:  No orders to display        EKG  See MDM    All EKG's are interpreted by the Emergency Department Physician who either signs or Co-signs this chart in the absence of a cardiologist.    [address] MDM:  52 y.o. female who presents ***               PROCEDURES:  None    CONSULTS:  None    CRITICAL CARE:  None    FINAL IMPRESSION      No diagnosis found. ***    DISPOSITION / PLAN     DISPOSITION        PATIENT REFERRED TO:  No follow-up provider specified.     DISCHARGE MEDICATIONS:  New Prescriptions    No medications on file       Jeremy Sadler DO  Emergency Medicine Physician    (Please note

## 2023-12-05 NOTE — ED NOTES
Pt c/o tremors, weakness, frequent falls. Denies LOC, denies being on blood thinners.      [facility]  12/04/23 7208 Pt complaining of a new onset of headache. PRN medication already administered. MD notified. Awaiting return call.  Electronically signed by Katy Ni RN on 3/10/2021 at 11:46 AM

## 2023-12-06 ENCOUNTER — OFFICE VISIT (OUTPATIENT)
Dept: FAMILY MEDICINE CLINIC | Age: 53
End: 2023-12-06
Payer: OTHER GOVERNMENT

## 2023-12-06 VITALS
SYSTOLIC BLOOD PRESSURE: 100 MMHG | HEIGHT: 67 IN | OXYGEN SATURATION: 100 % | DIASTOLIC BLOOD PRESSURE: 60 MMHG | WEIGHT: 160 LBS | BODY MASS INDEX: 25.11 KG/M2 | HEART RATE: 93 BPM

## 2023-12-06 DIAGNOSIS — E87.1 HYPONATREMIA: ICD-10-CM

## 2023-12-06 DIAGNOSIS — F17.200 SMOKER: ICD-10-CM

## 2023-12-06 DIAGNOSIS — F10.20 ALCOHOLISM (HCC): ICD-10-CM

## 2023-12-06 DIAGNOSIS — E87.6 HYPOKALEMIA: ICD-10-CM

## 2023-12-06 DIAGNOSIS — R63.4 UNINTENTIONAL WEIGHT LOSS: ICD-10-CM

## 2023-12-06 DIAGNOSIS — R53.1 GENERAL WEAKNESS: ICD-10-CM

## 2023-12-06 DIAGNOSIS — I10 HYPERTENSION, UNSPECIFIED TYPE: ICD-10-CM

## 2023-12-06 DIAGNOSIS — G47.33 OSA ON CPAP: ICD-10-CM

## 2023-12-06 DIAGNOSIS — R29.6 RECURRENT FALLS: Primary | ICD-10-CM

## 2023-12-06 DIAGNOSIS — R26.9 ABNORMAL GAIT: ICD-10-CM

## 2023-12-06 DIAGNOSIS — R53.83 OTHER FATIGUE: ICD-10-CM

## 2023-12-06 DIAGNOSIS — S69.92XD HAND INJURY, LEFT, SUBSEQUENT ENCOUNTER: ICD-10-CM

## 2023-12-06 DIAGNOSIS — R42 DIZZINESS: ICD-10-CM

## 2023-12-06 PROCEDURE — 3074F SYST BP LT 130 MM HG: CPT | Performed by: FAMILY MEDICINE

## 2023-12-06 PROCEDURE — 99214 OFFICE O/P EST MOD 30 MIN: CPT | Performed by: FAMILY MEDICINE

## 2023-12-06 PROCEDURE — 3078F DIAST BP <80 MM HG: CPT | Performed by: FAMILY MEDICINE

## 2023-12-06 PROCEDURE — 1111F DSCHRG MED/CURRENT MED MERGE: CPT | Performed by: FAMILY MEDICINE

## 2023-12-06 ASSESSMENT — ENCOUNTER SYMPTOMS
DIARRHEA: 0
SORE THROAT: 0
SHORTNESS OF BREATH: 0
RHINORRHEA: 0
VOMITING: 0
COUGH: 0
ABDOMINAL PAIN: 0
EYE PAIN: 0

## 2023-12-06 NOTE — PROGRESS NOTES
[address]  Dept: [phone]  Dept Fax: [phone]: [phone]     12/6/2023    Visit type: Follow up    Reason for Visit: Follow-Up from Hospital (ED f/u Fatigue discharged 12/05. Pt states she had 3 falls 12/02, 12/04 and 12/03, she got scrapes on Rt knee and bruised on her Lt hand. States she's feeling dizzy and weakness. Denies SOB, chest tightness and pain.  )         Patient: Rajinder Aleman is a 52 y.o. female     HPI: 49-year-old female presents for follow-up visit after emergency department encounter yesterday. Patient had a few more falls due to dizziness, shuffling gait, significant generalized weakness. Patient was recently in the hospital due to severe hyponatremia secondary to alcohol. Labs have since improved. Patient denies any illness at this time. Falls have been getting worse and patient is using a walker for ambulation. ASSESSMENT/PLAN   1. Recurrent falls  -     Nirmal Zambrano MD, Neurology, [place]  -Advised use of walker, referral to physical therapy  -Discussed hydrating well, discussed proper diet  -Significant shuffling gait is noted and is part of issue in association with dizziness and weakness  2. Hyponatremia  -Resolved  3. Hypokalemia  -Resolved  4. Alcoholism ([address])  -Stable, has not had a drink in 13 days  5. Smoker  -Advised to quit smoking  6. Hand injury, left, subsequent encounter  -X-ray reviewed, no fracture seen. Pain has improved. 7. Hypertension, unspecified type  -Stable, continue with medications  8. Other fatigue  -     Nirmal Zambrano MD, Neurology, [place] with referral to hematology/oncology  9. JT on CPAP  -Stable, continue with CPAP  10. Dizziness  -     Sumit Zambrano MD, Neurology, [place]  11. General weakness  -     Nirmal Zambrano MD, Neurology, [place]  12.  Abnormal gait  -     Sumit Zambrano MD,

## 2023-12-07 ENCOUNTER — HOSPITAL ENCOUNTER (OUTPATIENT)
Dept: PHYSICAL THERAPY | Age: 53
Setting detail: THERAPIES SERIES
Discharge: HOME | End: 2023-12-07

## 2023-12-07 DIAGNOSIS — R53.1 GENERAL WEAKNESS: ICD-10-CM

## 2023-12-07 DIAGNOSIS — R26.9 ABNORMAL GAIT: ICD-10-CM

## 2023-12-07 DIAGNOSIS — R29.6 RECURRENT FALLS: Primary | ICD-10-CM

## 2023-12-08 ENCOUNTER — APPOINTMENT (OUTPATIENT)
Dept: CT IMAGING | Age: 53
DRG: 059 | End: 2023-12-08
Payer: OTHER GOVERNMENT

## 2023-12-08 ENCOUNTER — APPOINTMENT (OUTPATIENT)
Dept: MRI IMAGING | Age: 53
DRG: 059 | End: 2023-12-08
Payer: OTHER GOVERNMENT

## 2023-12-08 ENCOUNTER — HOSPITAL ENCOUNTER (INPATIENT)
Age: 53
LOS: 1 days | Discharge: NURSING, REHAB OR LONG-TERM CARE | DRG: 059 | End: 2023-12-12
Attending: EMERGENCY MEDICINE | Admitting: FAMILY MEDICINE
Payer: OTHER GOVERNMENT

## 2023-12-08 DIAGNOSIS — G37.2: Primary | ICD-10-CM

## 2023-12-08 PROBLEM — R29.6 RECURRENT FALLS: Status: ACTIVE | Noted: 2023-12-08

## 2023-12-08 LAB
ALBUMIN SERPL-MCNC: 3.8 G/DL (ref 3.5–4.6)
ALP SERPL-CCNC: 70 U/L (ref 40–130)
ALT SERPL-CCNC: 24 U/L (ref 0–33)
ANION GAP SERPL CALCULATED.3IONS-SCNC: 8 MEQ/L (ref 9–15)
AST SERPL-CCNC: 22 U/L (ref 0–35)
BASOPHILS # BLD: 0.1 K/UL (ref 0–0.2)
BASOPHILS NFR BLD: 1.5 %
BILIRUB SERPL-MCNC: 0.3 MG/DL (ref 0.2–0.7)
BILIRUB UR QL STRIP: NEGATIVE
BUN SERPL-MCNC: 4 MG/DL (ref 6–20)
CALCIUM SERPL-MCNC: 9.6 MG/DL (ref 8.5–9.9)
CHLORIDE SERPL-SCNC: 101 MEQ/L (ref 95–107)
CLARITY UR: CLEAR
CO2 SERPL-SCNC: 24 MEQ/L (ref 20–31)
COLOR UR: YELLOW
CREAT SERPL-MCNC: 0.54 MG/DL (ref 0.5–0.9)
EOSINOPHIL # BLD: 0.1 K/UL (ref 0–0.7)
EOSINOPHIL NFR BLD: 1.3 %
ERYTHROCYTE [DISTWIDTH] IN BLOOD BY AUTOMATED COUNT: 13.1 % (ref 11.5–14.5)
GLOBULIN SER CALC-MCNC: 2.3 G/DL (ref 2.3–3.5)
GLUCOSE SERPL-MCNC: 166 MG/DL (ref 70–99)
GLUCOSE UR STRIP-MCNC: NEGATIVE MG/DL
HCT VFR BLD AUTO: 36.7 % (ref 37–47)
HGB BLD-MCNC: 12.1 G/DL (ref 12–16)
HGB UR QL STRIP: NEGATIVE
KETONES UR STRIP-MCNC: NEGATIVE MG/DL
LEUKOCYTE ESTERASE UR QL STRIP: NEGATIVE
LYMPHOCYTES # BLD: 1.1 K/UL (ref 1–4.8)
LYMPHOCYTES NFR BLD: 20.4 %
MCH RBC QN AUTO: 34.6 PG (ref 27–31.3)
MCHC RBC AUTO-ENTMCNC: 33 % (ref 33–37)
MCV RBC AUTO: 104.9 FL (ref 79.4–94.8)
MONOCYTES # BLD: 0.4 K/UL (ref 0.2–0.8)
MONOCYTES NFR BLD: 7.3 %
NEUTROPHILS # BLD: 3.6 K/UL (ref 1.4–6.5)
NEUTS SEG NFR BLD: 69.1 %
NITRITE UR QL STRIP: NEGATIVE
PH UR STRIP: 6 [PH] (ref 5–9)
PLATELET # BLD AUTO: 379 K/UL (ref 130–400)
POTASSIUM SERPL-SCNC: 4.5 MEQ/L (ref 3.4–4.9)
PROT SERPL-MCNC: 6.1 G/DL (ref 6.3–8)
PROT UR STRIP-MCNC: NEGATIVE MG/DL
RBC # BLD AUTO: 3.5 M/UL (ref 4.2–5.4)
SODIUM SERPL-SCNC: 133 MEQ/L (ref 135–144)
SP GR UR STRIP: 1.01 (ref 1–1.03)
UROBILINOGEN UR STRIP-ACNC: 0.2 E.U./DL
WBC # BLD AUTO: 5.2 K/UL (ref 4.8–10.8)

## 2023-12-08 PROCEDURE — 70553 MRI BRAIN STEM W/O & W/DYE: CPT

## 2023-12-08 PROCEDURE — 6360000004 HC RX CONTRAST MEDICATION: Performed by: EMERGENCY MEDICINE

## 2023-12-08 PROCEDURE — 85025 COMPLETE CBC W/AUTO DIFF WBC: CPT

## 2023-12-08 PROCEDURE — 70496 CT ANGIOGRAPHY HEAD: CPT

## 2023-12-08 PROCEDURE — G0378 HOSPITAL OBSERVATION PER HR: HCPCS

## 2023-12-08 PROCEDURE — 99285 EMERGENCY DEPT VISIT HI MDM: CPT

## 2023-12-08 PROCEDURE — A9579 GAD-BASE MR CONTRAST NOS,1ML: HCPCS | Performed by: EMERGENCY MEDICINE

## 2023-12-08 PROCEDURE — 70498 CT ANGIOGRAPHY NECK: CPT

## 2023-12-08 PROCEDURE — 36415 COLL VENOUS BLD VENIPUNCTURE: CPT

## 2023-12-08 PROCEDURE — 6370000000 HC RX 637 (ALT 250 FOR IP): Performed by: NURSE PRACTITIONER

## 2023-12-08 PROCEDURE — 6360000002 HC RX W HCPCS: Performed by: INTERNAL MEDICINE

## 2023-12-08 PROCEDURE — 70450 CT HEAD/BRAIN W/O DYE: CPT

## 2023-12-08 PROCEDURE — 80053 COMPREHEN METABOLIC PANEL: CPT

## 2023-12-08 PROCEDURE — 81003 URINALYSIS AUTO W/O SCOPE: CPT

## 2023-12-08 RX ORDER — UBIDECARENONE 75 MG
100 CAPSULE ORAL DAILY
Status: DISCONTINUED | OUTPATIENT
Start: 2023-12-09 | End: 2023-12-12 | Stop reason: HOSPADM

## 2023-12-08 RX ORDER — SODIUM CHLORIDE 0.9 % (FLUSH) 0.9 %
5-40 SYRINGE (ML) INJECTION EVERY 12 HOURS SCHEDULED
Status: DISCONTINUED | OUTPATIENT
Start: 2023-12-08 | End: 2023-12-12 | Stop reason: HOSPADM

## 2023-12-08 RX ORDER — PANTOPRAZOLE SODIUM 40 MG/1
40 TABLET, DELAYED RELEASE ORAL
Status: DISCONTINUED | OUTPATIENT
Start: 2023-12-09 | End: 2023-12-12 | Stop reason: HOSPADM

## 2023-12-08 RX ORDER — ENOXAPARIN SODIUM 100 MG/ML
40 INJECTION SUBCUTANEOUS DAILY
Status: DISCONTINUED | OUTPATIENT
Start: 2023-12-08 | End: 2023-12-12 | Stop reason: HOSPADM

## 2023-12-08 RX ORDER — SODIUM CHLORIDE 0.9 % (FLUSH) 0.9 %
5-40 SYRINGE (ML) INJECTION PRN
Status: DISCONTINUED | OUTPATIENT
Start: 2023-12-08 | End: 2023-12-12 | Stop reason: HOSPADM

## 2023-12-08 RX ORDER — SODIUM CHLORIDE 9 MG/ML
INJECTION, SOLUTION INTRAVENOUS PRN
Status: DISCONTINUED | OUTPATIENT
Start: 2023-12-08 | End: 2023-12-12 | Stop reason: HOSPADM

## 2023-12-08 RX ORDER — ACETAMINOPHEN 325 MG/1
650 TABLET ORAL EVERY 6 HOURS PRN
Status: DISCONTINUED | OUTPATIENT
Start: 2023-12-08 | End: 2023-12-12 | Stop reason: HOSPADM

## 2023-12-08 RX ORDER — SULFAMETHOXAZOLE AND TRIMETHOPRIM 800; 160 MG/1; MG/1
1 TABLET ORAL 2 TIMES DAILY
Status: COMPLETED | OUTPATIENT
Start: 2023-12-08 | End: 2023-12-11

## 2023-12-08 RX ORDER — ONDANSETRON 2 MG/ML
4 INJECTION INTRAMUSCULAR; INTRAVENOUS EVERY 6 HOURS PRN
Status: DISCONTINUED | OUTPATIENT
Start: 2023-12-08 | End: 2023-12-12 | Stop reason: HOSPADM

## 2023-12-08 RX ORDER — ONDANSETRON 4 MG/1
4 TABLET, ORALLY DISINTEGRATING ORAL EVERY 8 HOURS PRN
Status: DISCONTINUED | OUTPATIENT
Start: 2023-12-08 | End: 2023-12-12 | Stop reason: HOSPADM

## 2023-12-08 RX ORDER — POLYETHYLENE GLYCOL 3350 17 G/17G
17 POWDER, FOR SOLUTION ORAL DAILY PRN
Status: DISCONTINUED | OUTPATIENT
Start: 2023-12-08 | End: 2023-12-12 | Stop reason: HOSPADM

## 2023-12-08 RX ORDER — ACETAMINOPHEN 650 MG/1
650 SUPPOSITORY RECTAL EVERY 6 HOURS PRN
Status: DISCONTINUED | OUTPATIENT
Start: 2023-12-08 | End: 2023-12-12 | Stop reason: HOSPADM

## 2023-12-08 RX ORDER — ATORVASTATIN CALCIUM 10 MG/1
10 TABLET, FILM COATED ORAL NIGHTLY
Status: DISCONTINUED | OUTPATIENT
Start: 2023-12-08 | End: 2023-12-12 | Stop reason: HOSPADM

## 2023-12-08 RX ORDER — MECOBALAMIN 5000 MCG
10 TABLET,DISINTEGRATING ORAL NIGHTLY
Status: DISCONTINUED | OUTPATIENT
Start: 2023-12-08 | End: 2023-12-12 | Stop reason: HOSPADM

## 2023-12-08 RX ORDER — NICOTINE 21 MG/24HR
1 PATCH, TRANSDERMAL 24 HOURS TRANSDERMAL DAILY
Status: DISCONTINUED | OUTPATIENT
Start: 2023-12-08 | End: 2023-12-12 | Stop reason: HOSPADM

## 2023-12-08 RX ORDER — LOSARTAN POTASSIUM 25 MG/1
25 TABLET ORAL DAILY
Status: DISCONTINUED | OUTPATIENT
Start: 2023-12-09 | End: 2023-12-12 | Stop reason: HOSPADM

## 2023-12-08 RX ORDER — OXYBUTYNIN CHLORIDE 5 MG/1
5 TABLET, EXTENDED RELEASE ORAL DAILY
Status: DISCONTINUED | OUTPATIENT
Start: 2023-12-09 | End: 2023-12-12 | Stop reason: HOSPADM

## 2023-12-08 RX ADMIN — SULFAMETHOXAZOLE AND TRIMETHOPRIM 1 TABLET: 800; 160 TABLET ORAL at 21:42

## 2023-12-08 RX ADMIN — IOPAMIDOL 75 ML: 612 INJECTION, SOLUTION INTRAVENOUS at 14:24

## 2023-12-08 RX ADMIN — ENOXAPARIN SODIUM 40 MG: 100 INJECTION SUBCUTANEOUS at 18:55

## 2023-12-08 RX ADMIN — Medication 10 MG: at 21:42

## 2023-12-08 RX ADMIN — ATORVASTATIN CALCIUM 10 MG: 10 TABLET, FILM COATED ORAL at 21:42

## 2023-12-08 RX ADMIN — GADOTERIDOL 15 ML: 279.3 INJECTION, SOLUTION INTRAVENOUS at 13:15

## 2023-12-08 ASSESSMENT — ENCOUNTER SYMPTOMS
RESPIRATORY NEGATIVE: 1
GASTROINTESTINAL NEGATIVE: 1
ALLERGIC/IMMUNOLOGIC NEGATIVE: 1
EYES NEGATIVE: 1

## 2023-12-08 ASSESSMENT — LIFESTYLE VARIABLES
HOW OFTEN DO YOU HAVE A DRINK CONTAINING ALCOHOL: NEVER
HOW MANY STANDARD DRINKS CONTAINING ALCOHOL DO YOU HAVE ON A TYPICAL DAY: PATIENT DOES NOT DRINK

## 2023-12-08 ASSESSMENT — PAIN - FUNCTIONAL ASSESSMENT: PAIN_FUNCTIONAL_ASSESSMENT: NONE - DENIES PAIN

## 2023-12-08 ASSESSMENT — PAIN SCALES - GENERAL: PAINLEVEL_OUTOF10: 0

## 2023-12-08 NOTE — ED PROVIDER NOTES
stenosis of the proximal left internal carotid artery using   NASCET criteria. 50% stenosis of the origin of the dominant right vertebral artery. The slightly smaller caliber non dominant left vertebral artery is widely   patent. CTA of the head shows no clinically significant abnormality with no sign of   large vessel occlusion. Anatomic variation as described above. CTA NECK W WO CONTRAST   Final Result   CTA of the neck shows noncalcified plaque with posterior wall ulceration of   the origin of the right internal carotid artery without stenosis using NASCET   criteria. Less than 50% stenosis of the proximal left internal carotid artery using   NASCET criteria. 50% stenosis of the origin of the dominant right vertebral artery. The slightly smaller caliber non dominant left vertebral artery is widely   patent. CTA of the head shows no clinically significant abnormality with no sign of   large vessel occlusion. Anatomic variation as described above. MRI BRAIN W WO CONTRAST   Final Result   1. There is central T2 hyperintensity within the ishan with peripheral   restricted diffusion with symmetric T2 hyperintensity involving the deep gray   nuclei bilaterally. 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 2. Otherwise, no acute intracranial abnormality. These results were sent to the CORE Team on 12/8/2023 at 1:34 pm to be   communicated to the referring/covering health care provider/office. CT Head W/O Contrast   Final Result   Age indeterminate abnormality within the ishan. MRI of the brain is   recommended for further evaluation. No prior studies are available for   comparison. The findings were called to the emergency department at the time   of dictation.                ED BEDSIDE ULTRASOUND:   Performed by ED Physician - none    LABS:  Labs Reviewed   CBC WITH AUTO DIFFERENTIAL - Abnormal; Notable for the

## 2023-12-08 NOTE — ED NOTES
Pt presenting to ED with co weakness and having difficulty walking. Pt has started using a walker in the past two weeks and lives at home alone.       Flakito Austin RN  12/08/23 4453

## 2023-12-08 NOTE — ED NOTES
Pt taken to the bathroom by w/c.  Pt having difficulty transferring to w/c.     Nakul Anne RN  12/08/23 1011

## 2023-12-08 NOTE — ED NOTES
Call to [facility] @ [address] [facility] responded @ [address] Karon Lubin  12/08/23 0228

## 2023-12-08 NOTE — H&P
Hospital Medicine  History and Physical    Patient:  Alfreda Amado  MRN: [id number]    CHIEF COMPLAINT:    Chief Complaint   Patient presents with    Extremity Weakness     Pt has been unable to care for self   Pt is unable to ambulate        History Obtained From:  Patient, EMR  Primary Care Physician: Marylu Childers MD    HISTORY OF PRESENT ILLNESS:   The patient is a 52 y.o. female who presents with recurrent falls. Recent history of electrolyte abnormalities. Abnormal imaging subsequently with increased falling. Admitted for further eval, neurology on consult. Past Medical History:      Diagnosis Date    Anxiety     Heartburn     Hyperlipidemia     Hypertension     Sleep apnea        Past Surgical History:      Procedure Laterality Date    COLONOSCOPY N/A 1/12/2023    COLONOSCOPY DIAGNOSTIC performed by Vikki Matthew MD at [facility]     TOTAL VAGINAL HYSTERECTOMY  12/09/2007    UPPER GASTROINTESTINAL ENDOSCOPY N/A 1/12/2023    EGD DIAGNOSTIC ONLY performed by Vikki Matthew MD at [address] [place] [address]       Medications Prior to Admission:    Prior to Admission medications    Medication Sig Start Date End Date Taking?  Authorizing Provider   melatonin 3 MG TABS tablet Take 1 tablet by mouth daily 12/4/23   Steven Forman MD   vitamin B-12 (CYANOCOBALAMIN) 100 MCG tablet Take 1 tablet by mouth daily 12/4/23 12/3/24  Steven Forman MD   oxybutynin (DITROPAN XL) 5 MG extended release tablet Take 1 tablet by mouth daily 12/4/23   Baron Jonatan MD   sulfamethoxazole-trimethoprim (BACTRIM DS;SEPTRA DS) 800-160 MG per tablet Take 1 tablet by mouth 2 times daily for 7 days 12/2/23 12/9/23  PAUL Malave - CNP   ondansetron (ZOFRAN) 4 MG tablet TAKE ONE TABLET BY MOUTH EVERY 12 HOURS AS NEEDED FOR NAUSEA OR VOMITING 9/27/23 1/18/24  Provider, MD Clement   Na Sulfate-K Sulfate-Mg Sulf (SUPREP) 17.5-3.13-1.6 GM/177ML SOLN solution As directed 11/15/22   Vikki Matthew MD

## 2023-12-08 NOTE — FLOWSHEET NOTE
12/8/23 @ 5 Notified Dr. Mcguire [place] of Neurology consult via [address] [place]

## 2023-12-09 PROBLEM — N93.8 DYSFUNCTIONAL UTERINE BLEEDING: Status: ACTIVE | Noted: 2023-12-09

## 2023-12-09 PROBLEM — J32.9 SINUSITIS: Status: ACTIVE | Noted: 2023-12-09

## 2023-12-09 PROBLEM — M16.0 BILATERAL PRIMARY OSTEOARTHRITIS OF HIP: Status: ACTIVE | Noted: 2018-05-24

## 2023-12-09 PROBLEM — G37.2: Status: ACTIVE | Noted: 2023-12-09

## 2023-12-09 PROBLEM — F10.20 ALCOHOL DEPENDENCE (HCC): Status: ACTIVE | Noted: 2023-12-09

## 2023-12-09 PROBLEM — F10.10 ALCOHOL ABUSE: Status: ACTIVE | Noted: 2023-12-09

## 2023-12-09 PROBLEM — Z78.9 IMPAIRED MOBILITY AND ACTIVITIES OF DAILY LIVING: Status: ACTIVE | Noted: 2023-12-09

## 2023-12-09 PROBLEM — Z74.09 IMPAIRED MOBILITY AND ACTIVITIES OF DAILY LIVING: Status: ACTIVE | Noted: 2023-12-09

## 2023-12-09 PROBLEM — F10.21 ALCOHOL DEPENDENCE IN REMISSION (HCC): Status: ACTIVE | Noted: 2023-12-09

## 2023-12-09 PROBLEM — N32.81 OVERACTIVE BLADDER: Status: ACTIVE | Noted: 2023-12-09

## 2023-12-09 PROBLEM — R73.03 PREDIABETES: Status: ACTIVE | Noted: 2018-05-24

## 2023-12-09 PROBLEM — G89.29 CHRONIC PAIN: Status: ACTIVE | Noted: 2023-12-09

## 2023-12-09 PROBLEM — R26.0 ATAXIC GAIT: Status: ACTIVE | Noted: 2023-12-09

## 2023-12-09 PROBLEM — R47.1 DYSARTHRIA: Status: ACTIVE | Noted: 2023-12-09

## 2023-12-09 PROBLEM — F17.200 CURRENT SMOKER: Status: ACTIVE | Noted: 2023-12-09

## 2023-12-09 PROBLEM — K21.9 GASTRO-ESOPHAGEAL REFLUX DISEASE WITHOUT ESOPHAGITIS: Status: ACTIVE | Noted: 2017-08-03

## 2023-12-09 PROBLEM — M25.373 INSTABILITY OF ANKLE: Status: ACTIVE | Noted: 2023-12-09

## 2023-12-09 PROBLEM — E55.9 VITAMIN D DEFICIENCY: Status: ACTIVE | Noted: 2023-12-09

## 2023-12-09 PROBLEM — M25.579 PAIN IN JOINT INVOLVING ANKLE AND FOOT: Status: ACTIVE | Noted: 2023-12-09

## 2023-12-09 PROBLEM — M47.816 SPONDYLOSIS OF LUMBAR SPINE: Status: ACTIVE | Noted: 2023-12-09

## 2023-12-09 PROBLEM — F41.0 PANIC DISORDER: Status: ACTIVE | Noted: 2023-12-09

## 2023-12-09 LAB
ANION GAP SERPL CALCULATED.3IONS-SCNC: 12 MEQ/L (ref 9–15)
BUN SERPL-MCNC: 5 MG/DL (ref 6–20)
CALCIUM SERPL-MCNC: 9.2 MG/DL (ref 8.5–9.9)
CHLORIDE SERPL-SCNC: 102 MEQ/L (ref 95–107)
CO2 SERPL-SCNC: 22 MEQ/L (ref 20–31)
CREAT SERPL-MCNC: 0.49 MG/DL (ref 0.5–0.9)
GLUCOSE SERPL-MCNC: 98 MG/DL (ref 70–99)
POTASSIUM SERPL-SCNC: 4.1 MEQ/L (ref 3.4–4.9)
SODIUM SERPL-SCNC: 136 MEQ/L (ref 135–144)

## 2023-12-09 PROCEDURE — 36415 COLL VENOUS BLD VENIPUNCTURE: CPT

## 2023-12-09 PROCEDURE — 6370000000 HC RX 637 (ALT 250 FOR IP): Performed by: NURSE PRACTITIONER

## 2023-12-09 PROCEDURE — 2580000003 HC RX 258: Performed by: INTERNAL MEDICINE

## 2023-12-09 PROCEDURE — G0378 HOSPITAL OBSERVATION PER HR: HCPCS

## 2023-12-09 PROCEDURE — 99221 1ST HOSP IP/OBS SF/LOW 40: CPT | Performed by: PHYSICAL MEDICINE & REHABILITATION

## 2023-12-09 PROCEDURE — 6360000002 HC RX W HCPCS: Performed by: INTERNAL MEDICINE

## 2023-12-09 PROCEDURE — 80048 BASIC METABOLIC PNL TOTAL CA: CPT

## 2023-12-09 PROCEDURE — 97163 PT EVAL HIGH COMPLEX 45 MIN: CPT

## 2023-12-09 PROCEDURE — 99223 1ST HOSP IP/OBS HIGH 75: CPT | Performed by: PSYCHIATRY & NEUROLOGY

## 2023-12-09 PROCEDURE — 6370000000 HC RX 637 (ALT 250 FOR IP): Performed by: INTERNAL MEDICINE

## 2023-12-09 PROCEDURE — 97166 OT EVAL MOD COMPLEX 45 MIN: CPT

## 2023-12-09 RX ADMIN — SULFAMETHOXAZOLE AND TRIMETHOPRIM 1 TABLET: 800; 160 TABLET ORAL at 20:38

## 2023-12-09 RX ADMIN — SULFAMETHOXAZOLE AND TRIMETHOPRIM 1 TABLET: 800; 160 TABLET ORAL at 09:43

## 2023-12-09 RX ADMIN — ACETAMINOPHEN 650 MG: 325 TABLET ORAL at 16:25

## 2023-12-09 RX ADMIN — Medication 100 MCG: at 09:43

## 2023-12-09 RX ADMIN — Medication 10 ML: at 09:47

## 2023-12-09 RX ADMIN — PANTOPRAZOLE SODIUM 40 MG: 40 TABLET, DELAYED RELEASE ORAL at 09:43

## 2023-12-09 RX ADMIN — Medication 10 MG: at 20:38

## 2023-12-09 RX ADMIN — Medication 5 ML: at 21:00

## 2023-12-09 RX ADMIN — ENOXAPARIN SODIUM 40 MG: 100 INJECTION SUBCUTANEOUS at 09:43

## 2023-12-09 RX ADMIN — ATORVASTATIN CALCIUM 10 MG: 10 TABLET, FILM COATED ORAL at 20:37

## 2023-12-09 RX ADMIN — LOSARTAN POTASSIUM 25 MG: 25 TABLET, FILM COATED ORAL at 09:44

## 2023-12-09 RX ADMIN — OXYBUTYNIN CHLORIDE 5 MG: 5 TABLET, EXTENDED RELEASE ORAL at 09:43

## 2023-12-09 ASSESSMENT — ENCOUNTER SYMPTOMS
BACK PAIN: 0
SORE THROAT: 0
NAUSEA: 1
TROUBLE SWALLOWING: 0
ABDOMINAL PAIN: 0
RHINORRHEA: 0
EYE PAIN: 0
ABDOMINAL DISTENTION: 0
NAUSEA: 0
DIARRHEA: 0
CONSTIPATION: 0
PHOTOPHOBIA: 0
VOMITING: 0
COLOR CHANGE: 0
COUGH: 0
CHOKING: 0
SHORTNESS OF BREATH: 0

## 2023-12-09 ASSESSMENT — PAIN DESCRIPTION - LOCATION: LOCATION: GENERALIZED

## 2023-12-09 ASSESSMENT — PAIN SCALES - GENERAL
PAINLEVEL_OUTOF10: 6
PAINLEVEL_OUTOF10: 0

## 2023-12-09 ASSESSMENT — PAIN DESCRIPTION - DESCRIPTORS: DESCRIPTORS: ACHING

## 2023-12-09 NOTE — CONSULTS
slightly smaller caliber non dominant left vertebral artery is widely patent. SOFT TISSUES: The lung apices are clear. No cervical or superior mediastinal lymphadenopathy. The larynx and pharynx are unremarkable. No acute abnormality of the salivary and thyroid glands. BONES: No acute osseous abnormality. CTA HEAD: ANTERIOR CIRCULATION: Moderate calcification of the carotid arteries in the horizontal and vertical portions of the carotid canals as well as of the portions in the cavernous sinuses, with less than 50% stenosis. No significant stenosis of the rest of the intracranial internal carotid, anterior cerebral, or middle cerebral arteries. No aneurysm. Patent anterior communicating artery. POSTERIOR CIRCULATION: No significant stenosis of the vertebral, basilar, or posterior cerebral arteries. No aneurysm. The right vertebral artery is dominant and is slightly larger in caliber than the non dominant left vertebral artery. The large left posterior communicating artery is associated with absence of the P1 segment of the left PCA, a common variant of normal.  I cannot definitely identify the right posterior communicating artery, a variant of normal. OTHER: No dural venous sinus thrombosis on this non-dedicated study. BRAIN: No mass effect or midline shift. No extra-axial fluid collection. The gray-white differentiation is maintained. CTA of the neck shows noncalcified plaque with posterior wall ulceration of the origin of the right internal carotid artery without stenosis using NASCET criteria. Less than 50% stenosis of the proximal left internal carotid artery using NASCET criteria. 50% stenosis of the origin of the dominant right vertebral artery. The slightly smaller caliber non dominant left vertebral artery is widely patent. CTA of the head shows no clinically significant abnormality with no sign of large vessel occlusion. Anatomic variation as described above.      MRI BRAIN W WO CONTRAST    Result
home        Complex Active General Medical Issues that complicate care and require daily medical supervision: 1. Principal Problem:    Recurrent falls  Active Problems:    Hypertension    Hyponatremia    Alcohol dependence in remission ([address])    Impaired mobility and activities of daily living  Resolved Problems:    * No resolved hospital problems. *            Recommendations:    Considering all of the factors above including the patient's current medical status, social status/home environment, their functional needs, and their ability to participate in a therapy program, I feel that they would best be served at: To be determined patient likely will benefit most from acute rehab. She would need pre-CERT through  which she is closed on the weekends. Specialized nursing care to focus on: Bowel and bladder issues-Monitor for urinary retention-check PVRs, bladder scan--cath if no void. Wound risk and management   -pressure relief protocols-side to side turns  IVF medication administration      Monitor endurance and if necessary spread therapy out over a 7-day window-adding scheduled rest breaks when needed. Focus on energy conservation. Monitor heart rate and   cardiac medications effects on heart rate and blood pressure before, during and after therapy. Progress toward endurance training with pulse ox monitoring for saturation and heart rate. monitoring for dysphagia-- Improve hydration and nutrition by adding Vitamin B12 shot times one, adding Protein supplements and push PO fluids. Treat and monitor for higher level cognitive deficits, focus on difficulty with sequencing and problem-solving. Focus on higher-level balance and falls risk issues focusing on balance training and monitoring for orthostasis. Above recommendations are indicated to address medical complexity and need for appropriate rehab services.   Will tailor individual care and rehab plan per individuals needs re focus on

## 2023-12-09 NOTE — FLOWSHEET NOTE
Assessment completed. VSS. Pt is alert et oriented x4. Denies chest pain,sob or nausea. Call light within reach. Electronically signed by Adela Medrano RN on 12/9/2023 at 12:17 PM

## 2023-12-09 NOTE — ACP (ADVANCE CARE PLANNING)
Advance Care Planning     Advance Care Planning Activator (Inpatient)  Conversation Note      Date of ACP Conversation: 12/8/2023     Conversation Conducted with: Patient with Decision Making Capacity    ACP Activator: Horace Dawn RN    Pt states that she has a living with and it is current with her wishes.     Health Care Decision Maker:     Current Designated Health Care Decision Maker:     Primary Decision Maker: Farida Britt - [facility] - [phone]    Secondary: Triston Konawa: brother: [phone]

## 2023-12-10 LAB
ANION GAP SERPL CALCULATED.3IONS-SCNC: 11 MEQ/L (ref 9–15)
BUN SERPL-MCNC: 6 MG/DL (ref 6–20)
CALCIUM SERPL-MCNC: 9.3 MG/DL (ref 8.5–9.9)
CHLORIDE SERPL-SCNC: 97 MEQ/L (ref 95–107)
CO2 SERPL-SCNC: 21 MEQ/L (ref 20–31)
CREAT SERPL-MCNC: 0.57 MG/DL (ref 0.5–0.9)
GLUCOSE SERPL-MCNC: 111 MG/DL (ref 70–99)
POTASSIUM SERPL-SCNC: 3.9 MEQ/L (ref 3.4–4.9)
SODIUM SERPL-SCNC: 129 MEQ/L (ref 135–144)

## 2023-12-10 PROCEDURE — 6370000000 HC RX 637 (ALT 250 FOR IP): Performed by: PSYCHIATRY & NEUROLOGY

## 2023-12-10 PROCEDURE — 2580000003 HC RX 258: Performed by: INTERNAL MEDICINE

## 2023-12-10 PROCEDURE — 36415 COLL VENOUS BLD VENIPUNCTURE: CPT

## 2023-12-10 PROCEDURE — G0378 HOSPITAL OBSERVATION PER HR: HCPCS

## 2023-12-10 PROCEDURE — 97112 NEUROMUSCULAR REEDUCATION: CPT

## 2023-12-10 PROCEDURE — 6370000000 HC RX 637 (ALT 250 FOR IP): Performed by: NURSE PRACTITIONER

## 2023-12-10 PROCEDURE — 6370000000 HC RX 637 (ALT 250 FOR IP): Performed by: INTERNAL MEDICINE

## 2023-12-10 PROCEDURE — 97116 GAIT TRAINING THERAPY: CPT

## 2023-12-10 PROCEDURE — 6360000002 HC RX W HCPCS: Performed by: INTERNAL MEDICINE

## 2023-12-10 PROCEDURE — 80048 BASIC METABOLIC PNL TOTAL CA: CPT

## 2023-12-10 PROCEDURE — 6360000002 HC RX W HCPCS: Performed by: PSYCHIATRY & NEUROLOGY

## 2023-12-10 PROCEDURE — 99232 SBSQ HOSP IP/OBS MODERATE 35: CPT | Performed by: PSYCHIATRY & NEUROLOGY

## 2023-12-10 RX ORDER — FAMOTIDINE 20 MG/1
20 TABLET, FILM COATED ORAL 2 TIMES DAILY
Status: DISCONTINUED | OUTPATIENT
Start: 2023-12-10 | End: 2023-12-12 | Stop reason: HOSPADM

## 2023-12-10 RX ORDER — DEXAMETHASONE 4 MG/1
4 TABLET ORAL EVERY 8 HOURS SCHEDULED
Status: DISCONTINUED | OUTPATIENT
Start: 2023-12-10 | End: 2023-12-12 | Stop reason: HOSPADM

## 2023-12-10 RX ADMIN — DEXAMETHASONE 4 MG: 4 TABLET ORAL at 12:58

## 2023-12-10 RX ADMIN — PANTOPRAZOLE SODIUM 40 MG: 40 TABLET, DELAYED RELEASE ORAL at 06:12

## 2023-12-10 RX ADMIN — DEXAMETHASONE 4 MG: 4 TABLET ORAL at 22:00

## 2023-12-10 RX ADMIN — ACETAMINOPHEN 650 MG: 325 TABLET ORAL at 14:01

## 2023-12-10 RX ADMIN — LOSARTAN POTASSIUM 25 MG: 25 TABLET, FILM COATED ORAL at 08:15

## 2023-12-10 RX ADMIN — OXYBUTYNIN CHLORIDE 5 MG: 5 TABLET, EXTENDED RELEASE ORAL at 08:15

## 2023-12-10 RX ADMIN — ENOXAPARIN SODIUM 40 MG: 100 INJECTION SUBCUTANEOUS at 08:14

## 2023-12-10 RX ADMIN — FAMOTIDINE 20 MG: 20 TABLET ORAL at 12:58

## 2023-12-10 RX ADMIN — Medication 10 MG: at 20:19

## 2023-12-10 RX ADMIN — SULFAMETHOXAZOLE AND TRIMETHOPRIM 1 TABLET: 800; 160 TABLET ORAL at 08:15

## 2023-12-10 RX ADMIN — Medication 5 ML: at 21:00

## 2023-12-10 RX ADMIN — ATORVASTATIN CALCIUM 10 MG: 10 TABLET, FILM COATED ORAL at 20:19

## 2023-12-10 RX ADMIN — SULFAMETHOXAZOLE AND TRIMETHOPRIM 1 TABLET: 800; 160 TABLET ORAL at 20:19

## 2023-12-10 RX ADMIN — Medication 100 MCG: at 08:15

## 2023-12-10 RX ADMIN — Medication 5 ML: at 08:20

## 2023-12-10 ASSESSMENT — ENCOUNTER SYMPTOMS
CHOKING: 0
PHOTOPHOBIA: 0
VOMITING: 0
SHORTNESS OF BREATH: 0
NAUSEA: 0
BACK PAIN: 0
TROUBLE SWALLOWING: 0
COLOR CHANGE: 0

## 2023-12-10 ASSESSMENT — PAIN DESCRIPTION - DESCRIPTORS
DESCRIPTORS: ACHING
DESCRIPTORS: ACHING

## 2023-12-10 ASSESSMENT — PAIN SCALES - GENERAL
PAINLEVEL_OUTOF10: 4
PAINLEVEL_OUTOF10: 2
PAINLEVEL_OUTOF10: 2

## 2023-12-10 ASSESSMENT — PAIN DESCRIPTION - ORIENTATION
ORIENTATION: LEFT
ORIENTATION: LEFT

## 2023-12-10 ASSESSMENT — PAIN DESCRIPTION - LOCATION
LOCATION: OTHER (COMMENT)
LOCATION: OTHER (COMMENT)

## 2023-12-11 PROBLEM — G37.2 CENTRAL PONTINE MYELINOLYSIS (HCC): Status: ACTIVE | Noted: 2023-12-11

## 2023-12-11 LAB
ANION GAP SERPL CALCULATED.3IONS-SCNC: 17 MEQ/L (ref 9–15)
BUN SERPL-MCNC: 9 MG/DL (ref 6–20)
CALCIUM SERPL-MCNC: 10 MG/DL (ref 8.5–9.9)
CHLORIDE SERPL-SCNC: 100 MEQ/L (ref 95–107)
CO2 SERPL-SCNC: 17 MEQ/L (ref 20–31)
CREAT SERPL-MCNC: 0.63 MG/DL (ref 0.5–0.9)
GLUCOSE SERPL-MCNC: 123 MG/DL (ref 70–99)
POTASSIUM SERPL-SCNC: 4.3 MEQ/L (ref 3.4–4.9)
SODIUM SERPL-SCNC: 134 MEQ/L (ref 135–144)

## 2023-12-11 PROCEDURE — 6360000002 HC RX W HCPCS: Performed by: PSYCHIATRY & NEUROLOGY

## 2023-12-11 PROCEDURE — 36415 COLL VENOUS BLD VENIPUNCTURE: CPT

## 2023-12-11 PROCEDURE — 6370000000 HC RX 637 (ALT 250 FOR IP): Performed by: FAMILY MEDICINE

## 2023-12-11 PROCEDURE — 6370000000 HC RX 637 (ALT 250 FOR IP): Performed by: NURSE PRACTITIONER

## 2023-12-11 PROCEDURE — G0378 HOSPITAL OBSERVATION PER HR: HCPCS

## 2023-12-11 PROCEDURE — 97535 SELF CARE MNGMENT TRAINING: CPT

## 2023-12-11 PROCEDURE — 6360000002 HC RX W HCPCS: Performed by: INTERNAL MEDICINE

## 2023-12-11 PROCEDURE — 99232 SBSQ HOSP IP/OBS MODERATE 35: CPT | Performed by: PHYSICAL MEDICINE & REHABILITATION

## 2023-12-11 PROCEDURE — 2580000003 HC RX 258: Performed by: INTERNAL MEDICINE

## 2023-12-11 PROCEDURE — 99232 SBSQ HOSP IP/OBS MODERATE 35: CPT | Performed by: PSYCHIATRY & NEUROLOGY

## 2023-12-11 PROCEDURE — 6370000000 HC RX 637 (ALT 250 FOR IP): Performed by: INTERNAL MEDICINE

## 2023-12-11 PROCEDURE — 97116 GAIT TRAINING THERAPY: CPT

## 2023-12-11 PROCEDURE — 6370000000 HC RX 637 (ALT 250 FOR IP): Performed by: PSYCHIATRY & NEUROLOGY

## 2023-12-11 PROCEDURE — APPSS30 APP SPLIT SHARED TIME 16-30 MINUTES: Performed by: NURSE PRACTITIONER

## 2023-12-11 PROCEDURE — 80048 BASIC METABOLIC PNL TOTAL CA: CPT

## 2023-12-11 PROCEDURE — 1210000000 HC MED SURG R&B

## 2023-12-11 RX ORDER — POLYETHYLENE GLYCOL 3350 17 G/17G
17 POWDER, FOR SOLUTION ORAL DAILY
Status: DISCONTINUED | OUTPATIENT
Start: 2023-12-11 | End: 2023-12-12 | Stop reason: HOSPADM

## 2023-12-11 RX ADMIN — DEXAMETHASONE 4 MG: 4 TABLET ORAL at 07:00

## 2023-12-11 RX ADMIN — SULFAMETHOXAZOLE AND TRIMETHOPRIM 1 TABLET: 800; 160 TABLET ORAL at 09:58

## 2023-12-11 RX ADMIN — PANTOPRAZOLE SODIUM 40 MG: 40 TABLET, DELAYED RELEASE ORAL at 07:00

## 2023-12-11 RX ADMIN — OXYBUTYNIN CHLORIDE 5 MG: 5 TABLET, EXTENDED RELEASE ORAL at 09:58

## 2023-12-11 RX ADMIN — ACETAMINOPHEN 650 MG: 325 TABLET ORAL at 16:01

## 2023-12-11 RX ADMIN — ATORVASTATIN CALCIUM 10 MG: 10 TABLET, FILM COATED ORAL at 20:41

## 2023-12-11 RX ADMIN — FAMOTIDINE 20 MG: 20 TABLET ORAL at 20:41

## 2023-12-11 RX ADMIN — LOSARTAN POTASSIUM 25 MG: 25 TABLET, FILM COATED ORAL at 09:58

## 2023-12-11 RX ADMIN — POLYETHYLENE GLYCOL 3350 17 G: 17 POWDER, FOR SOLUTION ORAL at 10:03

## 2023-12-11 RX ADMIN — ENOXAPARIN SODIUM 40 MG: 100 INJECTION SUBCUTANEOUS at 09:58

## 2023-12-11 RX ADMIN — DEXAMETHASONE 4 MG: 4 TABLET ORAL at 13:55

## 2023-12-11 RX ADMIN — Medication 100 MCG: at 09:57

## 2023-12-11 RX ADMIN — Medication 10 MG: at 20:41

## 2023-12-11 RX ADMIN — Medication 10 ML: at 20:41

## 2023-12-11 RX ADMIN — Medication 10 ML: at 10:03

## 2023-12-11 RX ADMIN — FAMOTIDINE 20 MG: 20 TABLET ORAL at 09:58

## 2023-12-11 RX ADMIN — DEXAMETHASONE 4 MG: 4 TABLET ORAL at 22:10

## 2023-12-11 ASSESSMENT — PAIN SCALES - GENERAL
PAINLEVEL_OUTOF10: 0
PAINLEVEL_OUTOF10: 4

## 2023-12-11 ASSESSMENT — ENCOUNTER SYMPTOMS
VOMITING: 0
TROUBLE SWALLOWING: 0
COLOR CHANGE: 0
BACK PAIN: 0
NAUSEA: 0
CHOKING: 0
SHORTNESS OF BREATH: 0

## 2023-12-12 ENCOUNTER — HOSPITAL ENCOUNTER (INPATIENT)
Age: 53
LOS: 18 days | Discharge: HOME HEALTH | End: 2023-12-30
Attending: PHYSICAL MEDICINE & REHABILITATION | Admitting: PHYSICAL MEDICINE & REHABILITATION
Payer: OTHER GOVERNMENT

## 2023-12-12 VITALS
HEART RATE: 53 BPM | OXYGEN SATURATION: 99 % | HEIGHT: 68 IN | BODY MASS INDEX: 24.25 KG/M2 | TEMPERATURE: 97.6 F | WEIGHT: 160 LBS | SYSTOLIC BLOOD PRESSURE: 121 MMHG | DIASTOLIC BLOOD PRESSURE: 86 MMHG | RESPIRATION RATE: 16 BRPM

## 2023-12-12 DIAGNOSIS — G89.29 INSOMNIA SECONDARY TO CHRONIC PAIN: ICD-10-CM

## 2023-12-12 DIAGNOSIS — G47.01 INSOMNIA SECONDARY TO CHRONIC PAIN: ICD-10-CM

## 2023-12-12 DIAGNOSIS — F41.9 ANXIETY: ICD-10-CM

## 2023-12-12 DIAGNOSIS — Z74.09 IMPAIRED MOBILITY AND ACTIVITIES OF DAILY LIVING: Primary | ICD-10-CM

## 2023-12-12 DIAGNOSIS — Z78.9 IMPAIRED MOBILITY AND ACTIVITIES OF DAILY LIVING: Primary | ICD-10-CM

## 2023-12-12 LAB
ANION GAP SERPL CALCULATED.3IONS-SCNC: 12 MEQ/L (ref 9–15)
BASOPHILS # BLD: 0 K/UL (ref 0–0.2)
BASOPHILS NFR BLD: 0.8 %
BUN SERPL-MCNC: 11 MG/DL (ref 6–20)
CALCIUM SERPL-MCNC: 9.4 MG/DL (ref 8.5–9.9)
CHLORIDE SERPL-SCNC: 102 MEQ/L (ref 95–107)
CO2 SERPL-SCNC: 21 MEQ/L (ref 20–31)
CREAT SERPL-MCNC: 0.59 MG/DL (ref 0.5–0.9)
EOSINOPHIL # BLD: 0 K/UL (ref 0–0.7)
EOSINOPHIL NFR BLD: 0.8 %
ERYTHROCYTE [DISTWIDTH] IN BLOOD BY AUTOMATED COUNT: 12.5 % (ref 11.5–14.5)
GLUCOSE SERPL-MCNC: 127 MG/DL (ref 70–99)
HCT VFR BLD AUTO: 34.3 % (ref 37–47)
HGB BLD-MCNC: 11.8 G/DL (ref 12–16)
LYMPHOCYTES # BLD: 1 K/UL (ref 1–4.8)
LYMPHOCYTES NFR BLD: 19 %
MCH RBC QN AUTO: 34.9 PG (ref 27–31.3)
MCHC RBC AUTO-ENTMCNC: 34.4 % (ref 33–37)
MCV RBC AUTO: 101.5 FL (ref 79.4–94.8)
MONOCYTES # BLD: 0.3 K/UL (ref 0.2–0.8)
MONOCYTES NFR BLD: 5.2 %
NEUTROPHILS # BLD: 3.7 K/UL (ref 1.4–6.5)
NEUTS SEG NFR BLD: 73.8 %
PLATELET # BLD AUTO: 306 K/UL (ref 130–400)
POTASSIUM SERPL-SCNC: 4.4 MEQ/L (ref 3.4–4.9)
RBC # BLD AUTO: 3.38 M/UL (ref 4.2–5.4)
SARS-COV-2 RDRP RESP QL NAA+PROBE: NOT DETECTED
SODIUM SERPL-SCNC: 135 MEQ/L (ref 135–144)
WBC # BLD AUTO: 5 K/UL (ref 4.8–10.8)

## 2023-12-12 PROCEDURE — 99232 SBSQ HOSP IP/OBS MODERATE 35: CPT | Performed by: PHYSICAL MEDICINE & REHABILITATION

## 2023-12-12 PROCEDURE — APPSS15 APP SPLIT SHARED TIME 0-15 MINUTES: Performed by: NURSE PRACTITIONER

## 2023-12-12 PROCEDURE — 97116 GAIT TRAINING THERAPY: CPT

## 2023-12-12 PROCEDURE — 6360000002 HC RX W HCPCS: Performed by: PSYCHIATRY & NEUROLOGY

## 2023-12-12 PROCEDURE — 6360000002 HC RX W HCPCS: Performed by: INTERNAL MEDICINE

## 2023-12-12 PROCEDURE — 97535 SELF CARE MNGMENT TRAINING: CPT

## 2023-12-12 PROCEDURE — 87635 SARS-COV-2 COVID-19 AMP PRB: CPT

## 2023-12-12 PROCEDURE — 6370000000 HC RX 637 (ALT 250 FOR IP): Performed by: FAMILY MEDICINE

## 2023-12-12 PROCEDURE — 2580000003 HC RX 258: Performed by: INTERNAL MEDICINE

## 2023-12-12 PROCEDURE — 6370000000 HC RX 637 (ALT 250 FOR IP): Performed by: NURSE PRACTITIONER

## 2023-12-12 PROCEDURE — 80048 BASIC METABOLIC PNL TOTAL CA: CPT

## 2023-12-12 PROCEDURE — 85025 COMPLETE CBC W/AUTO DIFF WBC: CPT

## 2023-12-12 PROCEDURE — 6370000000 HC RX 637 (ALT 250 FOR IP): Performed by: PSYCHIATRY & NEUROLOGY

## 2023-12-12 PROCEDURE — 6360000002 HC RX W HCPCS: Performed by: FAMILY MEDICINE

## 2023-12-12 PROCEDURE — 36415 COLL VENOUS BLD VENIPUNCTURE: CPT

## 2023-12-12 PROCEDURE — 6370000000 HC RX 637 (ALT 250 FOR IP): Performed by: INTERNAL MEDICINE

## 2023-12-12 PROCEDURE — 99231 SBSQ HOSP IP/OBS SF/LOW 25: CPT | Performed by: PSYCHIATRY & NEUROLOGY

## 2023-12-12 PROCEDURE — 1180000000 HC REHAB R&B

## 2023-12-12 RX ORDER — ENOXAPARIN SODIUM 100 MG/ML
40 INJECTION SUBCUTANEOUS DAILY
Status: DISCONTINUED | OUTPATIENT
Start: 2023-12-13 | End: 2023-12-30 | Stop reason: HOSPADM

## 2023-12-12 RX ORDER — OXYBUTYNIN CHLORIDE 5 MG/1
5 TABLET, EXTENDED RELEASE ORAL DAILY
Status: CANCELLED | OUTPATIENT
Start: 2023-12-13

## 2023-12-12 RX ORDER — MECOBALAMIN 5000 MCG
10 TABLET,DISINTEGRATING ORAL NIGHTLY
Status: DISCONTINUED | OUTPATIENT
Start: 2023-12-12 | End: 2023-12-30 | Stop reason: HOSPADM

## 2023-12-12 RX ORDER — OXYBUTYNIN CHLORIDE 5 MG/1
5 TABLET, EXTENDED RELEASE ORAL DAILY
Status: DISCONTINUED | OUTPATIENT
Start: 2023-12-13 | End: 2023-12-30 | Stop reason: HOSPADM

## 2023-12-12 RX ORDER — PANTOPRAZOLE SODIUM 40 MG/1
40 TABLET, DELAYED RELEASE ORAL
Status: DISCONTINUED | OUTPATIENT
Start: 2023-12-13 | End: 2023-12-30 | Stop reason: HOSPADM

## 2023-12-12 RX ORDER — ACETAMINOPHEN 325 MG/1
650 TABLET ORAL EVERY 6 HOURS PRN
Status: CANCELLED | OUTPATIENT
Start: 2023-12-12

## 2023-12-12 RX ORDER — LOSARTAN POTASSIUM 25 MG/1
25 TABLET ORAL DAILY
Status: CANCELLED | OUTPATIENT
Start: 2023-12-13

## 2023-12-12 RX ORDER — ENOXAPARIN SODIUM 100 MG/ML
40 INJECTION SUBCUTANEOUS DAILY
Status: CANCELLED | OUTPATIENT
Start: 2023-12-13

## 2023-12-12 RX ORDER — DEXAMETHASONE 4 MG/1
4 TABLET ORAL EVERY 8 HOURS SCHEDULED
Status: CANCELLED | OUTPATIENT
Start: 2023-12-12 | End: 2023-12-14

## 2023-12-12 RX ORDER — ONDANSETRON 2 MG/ML
4 INJECTION INTRAMUSCULAR; INTRAVENOUS EVERY 6 HOURS PRN
Status: DISCONTINUED | OUTPATIENT
Start: 2023-12-12 | End: 2023-12-30 | Stop reason: HOSPADM

## 2023-12-12 RX ORDER — FAMOTIDINE 20 MG/1
20 TABLET, FILM COATED ORAL 2 TIMES DAILY
Status: DISCONTINUED | OUTPATIENT
Start: 2023-12-12 | End: 2023-12-30 | Stop reason: HOSPADM

## 2023-12-12 RX ORDER — ATORVASTATIN CALCIUM 10 MG/1
10 TABLET, FILM COATED ORAL NIGHTLY
Status: CANCELLED | OUTPATIENT
Start: 2023-12-12

## 2023-12-12 RX ORDER — NICOTINE 21 MG/24HR
1 PATCH, TRANSDERMAL 24 HOURS TRANSDERMAL DAILY
Status: DISCONTINUED | OUTPATIENT
Start: 2023-12-13 | End: 2023-12-30 | Stop reason: HOSPADM

## 2023-12-12 RX ORDER — PANTOPRAZOLE SODIUM 40 MG/1
40 TABLET, DELAYED RELEASE ORAL
Status: CANCELLED | OUTPATIENT
Start: 2023-12-13

## 2023-12-12 RX ORDER — UBIDECARENONE 75 MG
100 CAPSULE ORAL DAILY
Status: DISCONTINUED | OUTPATIENT
Start: 2023-12-13 | End: 2023-12-30 | Stop reason: HOSPADM

## 2023-12-12 RX ORDER — ATORVASTATIN CALCIUM 10 MG/1
10 TABLET, FILM COATED ORAL NIGHTLY
Status: DISCONTINUED | OUTPATIENT
Start: 2023-12-12 | End: 2023-12-30 | Stop reason: HOSPADM

## 2023-12-12 RX ORDER — NICOTINE 21 MG/24HR
1 PATCH, TRANSDERMAL 24 HOURS TRANSDERMAL DAILY
Status: CANCELLED | OUTPATIENT
Start: 2023-12-13

## 2023-12-12 RX ORDER — POLYETHYLENE GLYCOL 3350 17 G/17G
17 POWDER, FOR SOLUTION ORAL DAILY PRN
Status: DISCONTINUED | OUTPATIENT
Start: 2023-12-12 | End: 2023-12-30 | Stop reason: HOSPADM

## 2023-12-12 RX ORDER — ACETAMINOPHEN 650 MG/1
650 SUPPOSITORY RECTAL EVERY 6 HOURS PRN
Status: DISCONTINUED | OUTPATIENT
Start: 2023-12-12 | End: 2023-12-13

## 2023-12-12 RX ORDER — FAMOTIDINE 20 MG/1
20 TABLET, FILM COATED ORAL 2 TIMES DAILY
Status: CANCELLED | OUTPATIENT
Start: 2023-12-12

## 2023-12-12 RX ORDER — ACETAMINOPHEN 650 MG/1
650 SUPPOSITORY RECTAL EVERY 6 HOURS PRN
Status: CANCELLED | OUTPATIENT
Start: 2023-12-12

## 2023-12-12 RX ORDER — UBIDECARENONE 75 MG
100 CAPSULE ORAL DAILY
Status: CANCELLED | OUTPATIENT
Start: 2023-12-13

## 2023-12-12 RX ORDER — POLYETHYLENE GLYCOL 3350 17 G/17G
17 POWDER, FOR SOLUTION ORAL DAILY PRN
Status: CANCELLED | OUTPATIENT
Start: 2023-12-12

## 2023-12-12 RX ORDER — ONDANSETRON 2 MG/ML
4 INJECTION INTRAMUSCULAR; INTRAVENOUS EVERY 6 HOURS PRN
Status: CANCELLED | OUTPATIENT
Start: 2023-12-12

## 2023-12-12 RX ORDER — MECOBALAMIN 5000 MCG
10 TABLET,DISINTEGRATING ORAL NIGHTLY
Status: CANCELLED | OUTPATIENT
Start: 2023-12-12

## 2023-12-12 RX ORDER — ONDANSETRON 4 MG/1
4 TABLET, ORALLY DISINTEGRATING ORAL EVERY 8 HOURS PRN
Status: CANCELLED | OUTPATIENT
Start: 2023-12-12

## 2023-12-12 RX ORDER — ONDANSETRON 4 MG/1
4 TABLET, ORALLY DISINTEGRATING ORAL EVERY 8 HOURS PRN
Status: DISCONTINUED | OUTPATIENT
Start: 2023-12-12 | End: 2023-12-30 | Stop reason: HOSPADM

## 2023-12-12 RX ORDER — DEXAMETHASONE 4 MG/1
4 TABLET ORAL EVERY 8 HOURS SCHEDULED
Status: COMPLETED | OUTPATIENT
Start: 2023-12-12 | End: 2023-12-14

## 2023-12-12 RX ORDER — LOSARTAN POTASSIUM 25 MG/1
25 TABLET ORAL DAILY
Status: DISCONTINUED | OUTPATIENT
Start: 2023-12-13 | End: 2023-12-30 | Stop reason: HOSPADM

## 2023-12-12 RX ORDER — ACETAMINOPHEN 325 MG/1
650 TABLET ORAL EVERY 6 HOURS PRN
Status: DISCONTINUED | OUTPATIENT
Start: 2023-12-12 | End: 2023-12-13

## 2023-12-12 RX ADMIN — FAMOTIDINE 20 MG: 20 TABLET ORAL at 08:27

## 2023-12-12 RX ADMIN — ACETAMINOPHEN 650 MG: 325 TABLET ORAL at 14:16

## 2023-12-12 RX ADMIN — DEXAMETHASONE 4 MG: 4 TABLET ORAL at 14:11

## 2023-12-12 RX ADMIN — Medication 10 ML: at 08:36

## 2023-12-12 RX ADMIN — LOSARTAN POTASSIUM 25 MG: 25 TABLET, FILM COATED ORAL at 08:27

## 2023-12-12 RX ADMIN — ATORVASTATIN CALCIUM 10 MG: 10 TABLET, FILM COATED ORAL at 20:22

## 2023-12-12 RX ADMIN — ENOXAPARIN SODIUM 40 MG: 100 INJECTION SUBCUTANEOUS at 08:26

## 2023-12-12 RX ADMIN — DEXAMETHASONE 4 MG: 4 TABLET ORAL at 06:17

## 2023-12-12 RX ADMIN — FAMOTIDINE 20 MG: 20 TABLET ORAL at 20:22

## 2023-12-12 RX ADMIN — DEXAMETHASONE 4 MG: 4 TABLET ORAL at 20:27

## 2023-12-12 RX ADMIN — PANTOPRAZOLE SODIUM 40 MG: 40 TABLET, DELAYED RELEASE ORAL at 06:17

## 2023-12-12 RX ADMIN — Medication 10 MG: at 20:22

## 2023-12-12 RX ADMIN — Medication 100 MCG: at 08:36

## 2023-12-12 RX ADMIN — OXYBUTYNIN CHLORIDE 5 MG: 5 TABLET, EXTENDED RELEASE ORAL at 08:27

## 2023-12-12 ASSESSMENT — PAIN SCALES - GENERAL
PAINLEVEL_OUTOF10: 3
PAINLEVEL_OUTOF10: 0
PAINLEVEL_OUTOF10: 1

## 2023-12-12 ASSESSMENT — ENCOUNTER SYMPTOMS
NAUSEA: 0
CHOKING: 0
COLOR CHANGE: 0
TROUBLE SWALLOWING: 0
BACK PAIN: 0
VOMITING: 0
SHORTNESS OF BREATH: 0

## 2023-12-12 ASSESSMENT — PAIN DESCRIPTION - LOCATION
LOCATION: BACK;HIP
LOCATION: BACK;HIP

## 2023-12-12 ASSESSMENT — PAIN DESCRIPTION - ORIENTATION: ORIENTATION: LEFT

## 2023-12-12 NOTE — DISCHARGE SUMMARY
process. Impression  No acute process. Echo No results found for this or any previous visit. Disposition: acute rehab    In process/preliminary results:  Outstanding Order Results       No orders found from 11/9/2023 to 12/9/2023. Patient Instructions:   Current Discharge Medication List        CONTINUE these medications which have NOT CHANGED    Details   melatonin 3 MG TABS tablet Take 1 tablet by mouth daily  Qty: 30 tablet, Refills: 3    Associated Diagnoses: Sleeping difficulty      vitamin B-12 (CYANOCOBALAMIN) 100 MCG tablet Take 1 tablet by mouth daily  Qty: 60 tablet, Refills: 5    Associated Diagnoses: Alcoholism ([address]); Macrocytic anemia      oxybutynin (DITROPAN XL) 5 MG extended release tablet Take 1 tablet by mouth daily  Qty: 30 tablet, Refills: 1    Associated Diagnoses: Urge incontinence of urine      ondansetron (ZOFRAN) 4 MG tablet TAKE ONE TABLET BY MOUTH EVERY 12 HOURS AS NEEDED FOR NAUSEA OR VOMITING      telmisartan (MICARDIS) 40 MG tablet Take 1 tablet by mouth daily      atorvastatin (LIPITOR) 10 MG tablet Take 1 tablet by mouth daily      omeprazole (PRILOSEC) 20 MG delayed release capsule Take 2 capsules by mouth           STOP taking these medications       sulfamethoxazole-trimethoprim (BACTRIM DS;SEPTRA DS) 800-160 MG per tablet Comments:   Reason for Stopping:         Na Sulfate-K Sulfate-Mg Sulf (SUPREP) 17.5-3.13-1.6 GM/177ML SOLN solution Comments:   Reason for Stopping:             Activity: activity as tolerated  Diet: regular diet  Wound Care: none needed    Follow-up with PCP 1-2 weeks, neuro 1-2 months.     DC time 35 minutes    Signed:  Electronically signed by Savanah Smith MD on 12/12/2023 at 11:40 AM

## 2023-12-12 NOTE — CARE COORDINATION
Auth form and clinicals faxed to [phone] for request for acute inpatient rehab authorization. Will monitor for response.   Electronically signed by Viviane Murrieta on 12/11/2023 at 3:24 PM
Call received from Gurvinder Vo with [facility]. Gurvinder Vo informs of approval for acute inpatient rehab. Auth# [id number]. Patient approved for x7 days with admit date of today, NRD 12/19. Clinicals can be faxed to [phone]. Phone# [phone]. Gurvinder Vo requests confirmation of admission be faxed in. Will update 2W CM and anticipate admission to rehab today.   Electronically signed by Kayla Day on 12/12/2023 at 10:41 AM
Called patient regarding Inpatient Rehab Facility (IRF) referral. Discussed IRF mission, goals, daily process, 3 hours of intensive therapy per day,  prior authorization approval needed for admit to IRF and that they are closed on the weekends, Discharge planning with goal of home with [address] [facility]. Questions asked and understanding verbalized when answered. Using [place] of Choice, the patient chose [address] as her fist choice. Stated that she did not want to go to a Skilled. Assured her we will do our best to get her to Rehab when medically cleared.  Electronically signed by Giuliana Chisholm RN on 12/9/23 at 11:23 AM EST
Case Management Assessment  Initial Evaluation    Date/Time of Evaluation: 12/8/2023 9:29 PM  Assessment Completed by: Connor Gamez RN    If patient is discharged prior to next notation, then this note serves as note for discharge by case management. 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 (P) Yes    Chart Reviewed: Yes      History Provided by: Patient  Patient Orientation: Alert and Oriented, Person, Place, Situation, Self    Patient Cognition: Alert    Hospitalization in the last 30 days (Readmission):  No    If yes, Readmission Assessment in CM Navigator will be completed.     Advance Directives:      Code Status: Full Code   Patient's Primary Decision Maker is: Legal Next of Kin (vicky Rodriguez)    Primary Decision Maker: Vaishali Jeanita - Parent - [phone]    Secondary Decision Maker: Erlin Jean - Brother/Sister - [phone]    Discharge Planning:    Patient lives with: (P) Parent Type of Home: (P) House  Primary Care Giver: Self  Patient Support Systems include: Parent, Family Members   Current Financial resources: (P)  ([facility])  Current community resources: (P) None  Current services prior to admission: (P) None            Current DME:              Type of Home Care services:  None    ADLS  Prior functional level: (P) Independent in ADLs/IADLs  Current functional level: (P) Assistance with the following:, Bathing, Dressing, Toileting, Cooking, Housework, Shopping, Mobility    PT AM-PAC:   /24  OT AM-PAC:   /24    Family can provide assistance at DC: (P) Yes  Would you like Case Management to discuss the discharge plan with any other family members/significant others, and
[facility] rehab nurse called and is aware of the consult.
[facility] [address] [facility]
Inpatient rehab referral received. Patient getting MRI at this time. PT/OT has not been able to evaluate patient yet.   Electronically signed by Yash Shay on 12/8/2023 at 12:40 PM
PAS reviewed and signed by PM&R physician. Patient can admit to room 247 pending clearance from all consults and negative covid test. 2W CM updated.   Electronically signed by Hank Corona on 12/12/2023 at 11:13 AM
PLAN FOR [facility] ACUTE REHAB PENDING ACCEPTANCE AND PRECERT. SPOKE WITH GENEVIEVE OF REHAB, AWAITING DR. Serge Carr. WILL CONTINUE TO FOLLOW.
Pt seen in ER by CM and referral to [facility] received. I spoke to [facility] this am and she is reviewing and awaiting therapy evals to review. Rounds w/nrsg and pt to be seen by Neuro as well.
This nurse checked with Dr Jody Melendez. He states he is consulting a neurologist and would let this nurse know if PT/OT evaluations are needed.
This nurse got a reply from pt/ot who state that they will put the patient down for priority evals for tomorrow AM.
This nurse received a call from [address]  the [facility] rehab admission coordinator who stated that Dr Larri Scheuermann had left for the day and the patient would need to be evaluated by her tomorrow for and admission to their unit to be facilitated. This nurse informed Dr Rocio Beach.
This nurse spoke to Dr Lori Rob, the patient may have her PT/OT evals done. This nurse paged the pt/ot therapists.
This nurse spoke with the patient and Dr Sumanth Morataya. The patient is stating that she would like to receive some inpatient rehab due to weakness. She states that she is having difficulty walking and taking care of herself. This nurse spoke to the patient and she is stating interest in the [facility] inpatient rehab unit with other choices of:    [facility] rehab hospital    [facility]    If she is not able to go to the [facility] inpatient rehab unit. PT/OT consult orders are in and the therapists are notified. Their assessments can be done when the MRI is read and Dr Sumanth Morataya clears her for their evaluations. This nurse reviewed the process for admission to rehab units based on her tolerance of exercise. The patient states understanding.
Technique: Ambulating (12/09/23 1203)  Equipment Used: Grab bars (12/09/23 1203)  Toilet Transfer: Minimal assistance (12/09/23 1203)            Speech Language Pathology            Diet/Swallow:   Adult Diet Regular       Current Conditions Requiring Inpatient Rehabilitation  Bowel/Bladder Dysfunction: Yes  Intervention Required = Frequent toileting  Risk for Medical/Clinical Complications = moderate  Skin Healing/Breakdown Risk: Yes  Intervention Required = Side to side turns  Risk for Medical/Clinical Complications = moderate  Nutrition/Hydration Deficiency: Yes  Intervention Required = Monitor I&Os, Check Labs, and Dietary Eval  Risk for Medical/Clinical Complications = moderate  Medical Comorbidities: Yes  Intervention Required = DVT risk and anxiety, frequent falls, sleep apnea  Risk for Medical/Clinical Complications = moderate    Rehab/Skilled Needs:   3 hours of Intensive Acute Rehab therapy daily, 5 days/week for a total of 900 minutes  PT Treatment Time:  1.5 hrs/day  OT Treatment Time: 1.5 hrs/day  Rehabilitation Nursing   Case management/Social work  Dietitian/Nutrition    Cultural needs:   Ethnic, Cultural, Spiritual, and Jehovah's witness Needs  Do you have any ethnic, cultural, Presybeterian practices or restrictions we should know about during your stay in the hospital?  : No   Funding needs:   Potential Assistance Purchasing Medications: No     Expected Level of Improvement with Rehab  Assist for ADL Supervision / [place] for Transfers Supervision- Independent  Assist for Gait Supervision- Independent    Patient's willingness to participate: Yes  Patient's ability to tolerate proposed care: Yes  Patient/Family Goals of Rehab (in patient's/family's own words): I want to get stronger    Anticipated Discharge Plan:  Home with   Frank Sanchez RN PT OT Aide to be determined      Barriers to Discharge:  Home entry accessibility  Equipment needs  Caregiver availability  Resource availability      Rehab

## 2023-12-13 PROBLEM — Z74.09 IMPAIRED MOBILITY AND ADLS: Status: ACTIVE | Noted: 2023-12-13

## 2023-12-13 PROBLEM — Z78.9 IMPAIRED MOBILITY AND ADLS: Status: ACTIVE | Noted: 2023-12-13

## 2023-12-13 PROBLEM — R13.12 DYSPHAGIA, OROPHARYNGEAL PHASE: Status: ACTIVE | Noted: 2023-12-13

## 2023-12-13 PROCEDURE — 1180000000 HC REHAB R&B

## 2023-12-13 PROCEDURE — 97162 PT EVAL MOD COMPLEX 30 MIN: CPT

## 2023-12-13 PROCEDURE — 92523 SPEECH SOUND LANG COMPREHEN: CPT

## 2023-12-13 PROCEDURE — 99223 1ST HOSP IP/OBS HIGH 75: CPT | Performed by: PHYSICAL MEDICINE & REHABILITATION

## 2023-12-13 PROCEDURE — 97530 THERAPEUTIC ACTIVITIES: CPT

## 2023-12-13 PROCEDURE — 97535 SELF CARE MNGMENT TRAINING: CPT

## 2023-12-13 PROCEDURE — 92610 EVALUATE SWALLOWING FUNCTION: CPT

## 2023-12-13 PROCEDURE — 6370000000 HC RX 637 (ALT 250 FOR IP): Performed by: FAMILY MEDICINE

## 2023-12-13 PROCEDURE — 6360000002 HC RX W HCPCS: Performed by: FAMILY MEDICINE

## 2023-12-13 PROCEDURE — 99221 1ST HOSP IP/OBS SF/LOW 40: CPT | Performed by: NURSE PRACTITIONER

## 2023-12-13 PROCEDURE — 99231 SBSQ HOSP IP/OBS SF/LOW 25: CPT | Performed by: PSYCHIATRY & NEUROLOGY

## 2023-12-13 PROCEDURE — 97116 GAIT TRAINING THERAPY: CPT

## 2023-12-13 PROCEDURE — 97112 NEUROMUSCULAR REEDUCATION: CPT

## 2023-12-13 PROCEDURE — 97166 OT EVAL MOD COMPLEX 45 MIN: CPT

## 2023-12-13 RX ORDER — ENEMA 19; 7 G/133ML; G/133ML
1 ENEMA RECTAL DAILY PRN
Status: DISCONTINUED | OUTPATIENT
Start: 2023-12-13 | End: 2023-12-30 | Stop reason: HOSPADM

## 2023-12-13 RX ORDER — ZOLPIDEM TARTRATE 5 MG/1
5 TABLET ORAL NIGHTLY PRN
Status: DISCONTINUED | OUTPATIENT
Start: 2023-12-13 | End: 2023-12-30 | Stop reason: HOSPADM

## 2023-12-13 RX ORDER — ACETAMINOPHEN 325 MG/1
650 TABLET ORAL EVERY 4 HOURS PRN
Status: DISCONTINUED | OUTPATIENT
Start: 2023-12-13 | End: 2023-12-14

## 2023-12-13 RX ORDER — BISACODYL 10 MG
10 SUPPOSITORY, RECTAL RECTAL DAILY PRN
Status: DISCONTINUED | OUTPATIENT
Start: 2023-12-13 | End: 2023-12-30 | Stop reason: HOSPADM

## 2023-12-13 RX ADMIN — DEXAMETHASONE 4 MG: 4 TABLET ORAL at 21:37

## 2023-12-13 RX ADMIN — DEXAMETHASONE 4 MG: 4 TABLET ORAL at 05:34

## 2023-12-13 RX ADMIN — Medication 100 MCG: at 09:24

## 2023-12-13 RX ADMIN — FAMOTIDINE 20 MG: 20 TABLET ORAL at 09:24

## 2023-12-13 RX ADMIN — FAMOTIDINE 20 MG: 20 TABLET ORAL at 21:37

## 2023-12-13 RX ADMIN — DEXAMETHASONE 4 MG: 4 TABLET ORAL at 15:14

## 2023-12-13 RX ADMIN — ENOXAPARIN SODIUM 40 MG: 100 INJECTION SUBCUTANEOUS at 09:24

## 2023-12-13 RX ADMIN — ATORVASTATIN CALCIUM 10 MG: 10 TABLET, FILM COATED ORAL at 21:37

## 2023-12-13 RX ADMIN — LOSARTAN POTASSIUM 25 MG: 25 TABLET, FILM COATED ORAL at 09:24

## 2023-12-13 RX ADMIN — OXYBUTYNIN CHLORIDE 5 MG: 5 TABLET, EXTENDED RELEASE ORAL at 09:24

## 2023-12-13 RX ADMIN — Medication 10 MG: at 21:37

## 2023-12-13 RX ADMIN — PANTOPRAZOLE SODIUM 40 MG: 40 TABLET, DELAYED RELEASE ORAL at 05:39

## 2023-12-13 NOTE — PROGRESS NOTES
OCCUPATIONAL THERAPY  INPATIENT REHAB TREATMENT NOTE  [facility]      NAME: Vanessa Amanda  : 1970 (52 y.o.)  MRN: [id number]  CODE STATUS: Full Code  Room: Q615/A128-01    Date of Service: 2023    Referring Physician: Dr. 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 female presented with recurrent falls and inability to care for self. Recent history of electrolyte abnormalities. MRI of the brain showed results suggestive of osmotic demyelination syndrome otherwise no acute intracranial abnormality. 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      Restrictions  Restrictions/Precautions  Restrictions/Precautions: Fall Risk                 Patient's date of birth confirmed: Yes    SAFETY:  Safety Devices  Safety Devices in place: Yes  Type of devices: All fall risk precautions in place    SUBJECTIVE:       Pain at start of treatment: Yes: 3/10    Pain at end of treatment: Yes: 3/10    Location: L hip and low back  Nursing notified: Declined    COGNITION:  Orientation  Overall Orientation Status: Within Functional Limits  Orientation Level: Oriented X4  Cognition  Overall Cognitive Status: WFL      OBJECTIVE:     Provided pt with pipe conduit connectors to screw together supine in bed with HOB slightly elevated. Pt used R hand to screw conduit connectors onto the threads, and used L hand to stabilize the \"nut type piece. \"  Pt used 1st/2nd digits of R hand and L hand to complete task. Pt demo Poor + motor coordination, needing to re-attempt initial threading at times and requires slower pacing to complete task. Pt completed  Provided task to improve fine motor skills to continue improving functional task performance skills.          ASSESSMENT:  Assessment: Pt was very cooperative, demo deccreased fine motor coordination/dexterity during threading conduit connector task- needs to continue improving skills  Activity Tolerance: Patient tolerated treatment well      PLAN OF CARE:  Strengthening, Balance training, Functional mobility training, Endurance training, Safety education & training, Patient/Caregiver education & training, Equipment evaluation, education, & procurement, Neuromuscular re-education, Positioning, Home management training, Self-Care / ADL, Coordination training  Pt to continue OT POC    Patient goals : \"get my strength and dexterity back\"  Time Frame for Long Term Goals : Within 2 weeks, pt to demonstrate progress in the following areas below to achieve specific LTGs as stated in the initial evaluation  Long Term Goal 1: Increase functional endurance  Long Term Goal 2: increase strength  Long Term Goal 3: increase ADL status        Therapy Time:   Individual Group Co-Treat   Time In 1500       Time Out 1530         Minutes 30               Neuromuscular reeducation: 15 minutes  Therapeutic activities: 15 minutes     Electronically signed by:    MICHELA Martin,   12/13/2023, 3:26 PM

## 2023-12-13 NOTE — PROGRESS NOTES
since her admission on 12/08/23. 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 Patient reported that she is tolerating regular solids okay as long as she takes small bites. Behavior/Cognition: Alert; Cooperative;Pleasant mood  Respiratory Status: Room air  O2 Device: None (Room air)  Communication Observation: Dysarthria (reduced volume of voice, slow rate, minimal reduced articulation)  Follows Directions: Simple  Dentition: Adequate  Patient Positioning: Upright in chair  Baseline Vocal Quality: Weak  Prior Dysphagia History: None  Consistencies Administered: Regular;Pureed; Thin - straw;Mildly Thick - cup    Vision and Hearing  Vision  Vision: Impaired  Vision Exceptions: Wears glasses for reading  Hearing  Hearing: Within functional limits    Current Diet level  Current Diet : Regular  Current Liquid Diet : Thin    Oral Motor  Labial: Decreased seal;Right droop  Dentition: Full;Natural  Oral Hygiene: Moist;Clean  Lingual: Decreased rate; Incoordinated  Mandible: No impairment    Oral/Pharyngeal Phase  Oral Phase - Comment: Mild oral dysphagia was noted,. Pharyngeal Phase Comment: Suspect pharyngeal dysphagia secondary [place] osmotic demyelination syndrome. ST recommends instrumental assessment to further assess the pharyngeal phase of the swallow mechanism.     PO Trials  Neuromuscular Estim Used: No  Assessment Method(s): Palpation;Observation  Vocal Quality: Low volume  Consistency Presented: Regular  How Presented: Self-fed/presented  How much presented: 4  Bolus Acceptance: No impairment  Bolus Formation/Control: Impaired  Type of Impairment: Mastication  Propulsion: Tongue pumping  Oral Residue: Lingual;Less than 10% of bolus  Initiation of Swallow: Delayed (# of seconds)  Laryngeal Elevation: Functional  Aspiration Signs/Symptoms: None  Pharyngeal Phase Characteristics: No impairment, issues, or problems  Effective Modifications: None  Additional PO Trials: thick liquids and single sip of thin liquids. Patient did present with clear, but weak vocal quality and hyolaryngeal elevation was noted. ST recommends downgrrade to Regular solids with nectar thick liquids and a MBS to further assess the pharyngeal phase of the swallow mechanism.  Dysphagia Outcome Severity Scale: Level 4: Mild moderate dysphagia- Intermittent supervision/cueing. One - two diet consistencies restricted     Recommendations  Requires SLP Intervention: Yes  Recommendations: Modified barium swallow study;Dysphagia treatment  D/C Recommendations: Ongoing speech therapy is recommended during this hospitalization  Diet Solids Recommendation: Regular  Liquid Consistency Recommendation: Mildly Thick (Nectar)  Compensatory Swallowing Strategies : Alternate solids and liquids;Small bites/sips;No straws;Eat/Feed slowly  Recommended Form of Meds: Meds in puree  Therapeutic Interventions: Bolus control exercises;Diet tolerance monitoring;Patient/Family education;Oral motor exercises  Duration of Treatment: 2-3 weeks  Frequency of Treatment: 3-5/x/week for LOS or until all goals are met    Prognosis  Speech Therapy Prognosis  Prognosis: Good  Prognosis Considerations: Age;Potential;Participation Level    Education  Patient Education: Patient was educated on BSE results  Patient Education Response: Verbalizes understanding  Individuals consulted  Consulted and agree with results and recommendations: Patient;RN  RN Name: Shelly    [x]  shelly RN notified   [x]  Dr. Cain notified via voicemail  [x]  OT/PT Departments notified via mailboxes  [x]  Blue wrist band placed on patient  [x]  Swallow guide placed in room    Treatment/Goals  Short-term Goals  Timeframe for Short-term Goals: 1-2 weeks  Goal 1: Patient will tolerate Regular solids with nectar thick liquids with adeqaute mastication and oral clearance and no overt s/s of aspiration with all po trials.  Goal 2: Patient will follow safe feeding guidelines to

## 2023-12-13 NOTE — PLAN OF CARE
Problem: Safety - Adult  Goal: Free from fall injury  12/13/2023 0524 by Charity Sandoval RN  Outcome: Progressing     Problem: Discharge Planning  Goal: Discharge to home or other facility with appropriate resources  12/13/2023 0524 by Charity Sandoval RN  Outcome: Progressing     Problem: Pain  Goal: Verbalizes/displays adequate comfort level or baseline comfort level  12/13/2023 0524 by Charity Sandoval RN  Outcome: Progressing     Problem: Skin/Tissue Integrity  Goal: Absence of new skin breakdown  Description: 1. Monitor for areas of redness and/or skin breakdown  2. Assess vascular access sites hourly  3. Every 4-6 hours minimum:  Change oxygen saturation probe site  4. Every 4-6 hours:  If on nasal continuous positive airway pressure, respiratory therapy assess nares and determine need for appliance change or resting period. 12/13/2023 1706 by Chel Rivas RN  Outcome: Progressing  12/13/2023 0524 by Charity Sandoval RN  Outcome: Progressing     Problem: Physical Therapy - Adult  Goal: By Discharge: Performs mobility at highest level of function for planned discharge setting. See evaluation for individualized goals. 12/13/2023 1309 by Alexsandra Abreu PT  Outcome: Progressing     Problem: SLP Adult - Impaired Swallowing  Goal: By Discharge: Advance to least restrictive diet without signs or symptoms of aspiration for planned discharge setting. See evaluation for individualized goals. 12/13/2023 1424 by LENNY Panda  Outcome: Progressing     Problem: SLP Adult - Impaired Communication  Goal: By Discharge: Demonstrates communication skills at highest level of function for planned discharge setting. See evaluation for individualized goals.   12/13/2023 1424 by LENNY Panda  Outcome: Progressing

## 2023-12-13 NOTE — PROGRESS NOTES
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    Alcohol dependence in remission ([address]) 2023    Pain in joint involving ankle and foot 2023    Current smoker 2023    Chronic pain 2023    Dysfunctional uterine bleeding 2023    Instability of ankle 2023    Alcohol dependence ([address]) 2023    Overactive bladder 2023    Panic disorder 2023    Spondylosis of lumbar spine 2023    Sinusitis 2023    Vitamin D deficiency 2023    Impaired mobility and activities of daily living dt osmotic demyelination syndrome 2023    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    Ataxic gait 2023    Dysarthria 2023    Alcohol abuse 2023    Recurrent falls 2023    Hyponatremia 2023    Bilateral primary osteoarthritis of hip 2018    Prediabetes 2018    Gastro-esophageal reflux disease without esophagitis 2017       Past Medical History:   Diagnosis Date    Anxiety     Heartburn     Hyperlipidemia     Hypertension     Sleep apnea      Past Surgical History:   Procedure Laterality Date    COLONOSCOPY N/A 2023    COLONOSCOPY DIAGNOSTIC performed by Renate Dancer, MD at [facility]     TOTAL VAGINAL HYSTERECTOMY  2007    UPPER GASTROINTESTINAL ENDOSCOPY N/A 2023    EGD DIAGNOSTIC ONLY performed by Renate Dancer, MD at [facility] fasteners/self-care containers in a timely manner  - Patient will perform kitchen mobility at device level without episodes of LOB and good safety awareness   - Patient will perform basic room mobility at SUP level.  - Patient will access appropriate D/C site with as few architectural barriers as possible.  - Patient and/or caregiver will demonstrate understanding of energy conservation techniques with no verbal/tactile cues.   - Patient and/or caregiver will demonstrate understanding of recommended HEP for BUE strengthening.   - Problem Solving: Independent     Therapy Time:   Individual   Time In 0830   Time Out 0930   Minutes 60          Eval: 60 minutes     Electronically signed by:    Beverly Paiz OT,   12/13/2023, 10:02 AM

## 2023-12-13 NOTE — PROGRESS NOTES
Subjective:  The patient complains of severe acute on chronic progressive fatigue and  gait ataxia and quadriparesis partially relieved by rest, medications, PT,  OT,   SLP and rest and exacerbated by recent illness.   Patient presented through the emergency room with falls and dizziness.  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  A CTA of the neck showed noncalcified plaque with posterior wall ulceration of the origin of the right ICA without stenosis.     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  It was felt to be an aftermath of patient's underlying alcoholism and hyponatremia with a sodium as low as 105 with correction.  It was felt that nutritional demyelination was also likely comorbidity.  Oropharyngeal dysphagia was monitored for period    I am concerned about patient’s medical complexities and barriers to advancing in rehab goals including  complex medical and social issues.       I reviewed current care and plans for further care with other rehab providers including nursing and case management.  According to recent nursing note, \"  VSS. Pt is alert and oriented x4. Denies chest pain,sob or nausea at this time. Pt medicated per MAR. Call light within reach, bed in lowest position, and bed alarm on\".    ROS x10:  The patient also complains of severely impaired mobility and activities of daily living.  Otherwise no new problems with vision, hearing, nose, mouth, throat, dermal, cardiovascular, GI, , pulmonary, musculoskeletal, psychiatric or neurological. See also Acute Rehab PM&R H&P.       Vital signs:  BP (!) 141/76   Pulse 54   Temp 97.7 °F (36.5 °C) (Oral)   Resp 18   LMP 12/09/2007 (Exact Date)   SpO2 97%   I/O:   PO/Intake:  fair PO intake,  reg diet NTs    Bowel:   continent   Bladder:  checks with orthostatic checks-monitoring the effect of exercise, therapy and posture. Consult hospitalist for backup medical and adjust/add medications. Monitor heart rate and blood pressure as well as medications effects on vital signs before during and after therapy with especial focus on preventing orthostasis and falls risk. Sleep apnea-monitor overnight pulse ox provide supplemental O2 as needed    Hyponatremia-avoid excessive fluids avoid alcohol monitor sodium level    Recurrent falls,   Ataxic gait-focus on balance and therapy    Current smoker-NicoDerm patch    Chronic pain-topicals and lowest effective dose of pain medication    Overactive bladder-check postvoid residuals    Vitamin D deficiency-Add high-dose vitamin D, recheck vitamin D level out after discharge    Osmotic demyelination syndrome,   Central pontine myelinolysis     Alcohol abuse-avoid alcohol use provide emotional support encouraged12-step program       Focus of today's plan-  Initiate and modify therapuetic plan to meet patients individual needs, add rest breaks as needed, and Focus on reassessing rehab goals and coordinating therapy and medical self care issues.       Electronically signed by Sergo Madden DO on 12/13/23 at 2:05 PM CRAIG Escamilla D.O., PM&R     Attending    [address] [place] [address]

## 2023-12-13 NOTE — PROGRESS NOTES
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    Alcohol dependence in remission ([address]) 2023    Pain in joint involving ankle and foot 2023    Current smoker 2023    Chronic pain 2023    Dysfunctional uterine bleeding 2023    Instability of ankle 2023    Alcohol dependence ([address]) 2023    Overactive bladder 2023    Panic disorder 2023    Spondylosis of lumbar spine 2023    Sinusitis 2023    Vitamin D deficiency 2023    Impaired mobility and activities of daily living dt osmotic demyelination syndrome 2023    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    Ataxic gait 2023    Dysarthria 2023    Alcohol abuse 2023    Recurrent falls 2023    Hyponatremia 2023    Bilateral primary osteoarthritis of hip 2018    Prediabetes 2018    Gastro-esophageal reflux disease without esophagitis 2017       Past Medical History:   Diagnosis Date    Anxiety     Heartburn     Hyperlipidemia     Hypertension     Sleep apnea      Past Surgical History:   Procedure Laterality Date    COLONOSCOPY N/A 2023    COLONOSCOPY DIAGNOSTIC performed by Nash Dickerson MD at [place] [facility]     TOTAL VAGINAL HYSTERECTOMY  2007    UPPER GASTROINTESTINAL ENDOSCOPY N/A 2023    EGD DIAGNOSTIC ONLY performed by Nash Dickerson MD at [facility]       Chart Reviewed: Yes  Patient 2    Stairs/Curb  Stairs?: Yes  Stairs  # Steps : 4  Stairs Height: 6\"  Rails: Bilateral  Assistance: Minimal assistance  Comment: Reciprocal pattern. Steadying assist provided for safety and cues for full foot on each step.              Activity Tolerance  Activity Tolerance: Patient tolerated treatment well    Patient Education  Education Given To: Patient  Education Provided: Role of Therapy;Plan of Care  Education Method: Verbal  Education Outcome: Verbalized understanding;Continued education needed    Quality Indicators (IRF-AMARILIS):  Rolling L and R: Partial/Moderate Assistance (helper does <50%) - 3  Sit>Supine: Supervision or Touching Assistance - 4  Supine>Sit: Partial/Moderate Assistance (helper does <50%) - 3  Sit>Stand: Partial/Moderate Assistance (helper does <50%) - 3  Chair/Bed>Chair Transfer: Partial/Moderate Assistance (helper does <50%) - 3  Car Transfers: Not attempted due to Medical Condition or Safety Concerns (I.e. unsafe or physician orders) - 88  Walk 10 ft: Supervision or Touching Assistance - 4  Walk 50 ft with two 90 degree turns: Supervision or Touching Assistance - 4  Walk 150 ft in Corridor: Not attempted due to Medical Condition or Safety Concerns (I.e. unsafe or physician orders) - 88  Walking 10 ft on Unlevel Surface: Not attempted due to Medical Condition or Safety Concerns (I.e. unsafe or physician orders) - 88  Picking up Objects from Standing Position: Not attempted due to Medical Condition or Safety Concerns (I.e. unsafe or physician orders) - 88  Stairs: Yes  WC Mobility: No Not Applicable (pt did not complete item prior to admission) - 9    ASSESSMENT:  Body Structures, Functions, Activity Limitations Requiring Skilled Therapeutic Intervention: Decreased functional mobility ;Decreased strength;Decreased ADL status;Decreased endurance;Decreased body mechanics;Decreased balance;Decreased coordination;Decreased fine motor control  Decision Making: Medium Complexity  History:

## 2023-12-13 NOTE — PROGRESS NOTES
INDIVIDUALIZED OVERALL REHAB PLAN OF CARE  ADDENDUM TO REHAB PROGRESS NOTE-for audit purposes must also refer to this day's clinical note and combine the information      Date: 2023  Patient Name: Rene Rodgers   Room: A258/S020-73    MRN: [id number]    : 1970  (52 y.o.)  Gender: female       Today 2023 during weekly team meeting, I reviewed the patient Rene Rodgers in detail with the therapists and nurses involved in patient's care gathering complex physiatric data regarding current medical issues, progress in therapies, factors limiting progress, social issues, psychological issues, ongoing therapeutic plans and discharge planning. Legend:  I= independent Im =Modified independent  S=Supervised SB=stand by HERNANDES=set up CG=contact adry Min= minimal Mod=Moderate Max=maximal Max of 2 =maximal assist of 2 people      CURRENT FUNCTIONAL STATUS:     Patient presented through the emergency room with falls and dizziness. 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 A CTA of the neck showed noncalcified plaque with posterior wall ulceration of the origin of the right ICA without stenosis. 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 It was felt to be an aftermath of patient's underlying alcoholism and hyponatremia with a sodium as low as 105 with correction. It was felt that nutritional demyelination was also likely comorbidity. Oropharyngeal dysphagia was monitored for period. Parkinsonian features. NURSING ISSUES:    Pt assisted up to chair with walker. Tolerated well. VSS. No distress noted. Will continue with current carepla     Nursing will continue to focus on bowel and bladder continence transitioning toward independence by time of discharge.   Monitoring post void residuals monitoring for

## 2023-12-13 NOTE — CONSULTS
Consult Note            Date:12/13/2023        Patient Sherry Mays     YOB: 1970     Age:53 y.o. Reason for Consult: Medical management of hypertension    Chief Complaint   Weakness    History Obtained From   patient, electronic medical record    History of Present Illness   Lianne Mcwilliams is a 52-year-old  female with significant past medical history of hypertension, hyperlipidemia, sleep apnea, GERD, anxiety, who was initially admitted for recurrent falls and hyponatremia. Patient's condition stabilized. Currently, patient is admitted to [facility] for PT and OT. At the time of examination, patient denies dizziness, headache, shortness of breath, chest pain, and N/V. IM hospitalist team was consulted for medical management of acute and chronic health conditions. Past Medical History     Past Medical History:   Diagnosis Date    Anxiety     Heartburn     Hyperlipidemia     Hypertension     Sleep apnea         Past Surgical History     Past Surgical History:   Procedure Laterality Date    COLONOSCOPY N/A 1/12/2023    COLONOSCOPY DIAGNOSTIC performed by Donnie Steven MD at [facility]     TOTAL VAGINAL HYSTERECTOMY  12/09/2007    UPPER GASTROINTESTINAL ENDOSCOPY N/A 1/12/2023    EGD DIAGNOSTIC ONLY performed by Donnie Steven MD at [facility]        Medications     Prior to Admission medications    Medication Sig Start Date End Date Taking?  Authorizing Provider   melatonin 3 MG TABS tablet Take 1 tablet by mouth daily  Patient taking differently: Take 1 tablet by mouth daily Indications: patient states she started taking 10mg this past week 12/4/23   Fabrice Cornell MD   vitamin B-12 (CYANOCOBALAMIN) 100 MCG tablet Take 1 tablet by mouth daily 12/4/23 12/3/24  James Forman MD   oxybutynin (DITROPAN XL) 5 MG extended release tablet Take 1 tablet by mouth daily 12/4/23   James Forman MD   ondansetron (ZOFRAN) 4 MG tablet TAKE ONE TABLET BY Yes    Hyperlipidemia 12/13/2023 Yes    Hypertension 12/13/2023 Yes    Sleep apnea 12/13/2023 Yes    Hyponatremia 12/13/2023 Yes    Recurrent falls 12/13/2023 Yes    Current smoker 12/13/2023 Yes    Chronic pain 12/13/2023 Yes    Overactive bladder 12/13/2023 Yes    Vitamin D deficiency 12/13/2023 Yes    Osmotic demyelination syndrome (HCC) 12/13/2023 Yes    Ataxic gait 12/13/2023 Yes    Alcohol abuse 12/13/2023 Yes    Central pontine myelinolysis (HCC) 12/13/2023 Yes    Impaired mobility and ADLs 12/13/2023 Yes       Plan     Impaired mobility and activities of daily living due to osmotic demyelination syndrome, recurrent falls  Dr. Cain managing  Continue PT and OT    Osmotic demyelination syndrome, large central pontine myelosis  MRI of the brain showed a central T2 hyperintensity within the ishan with peripheral restricted diffusion with symmetric T2 hyperintensity involving the deep gray nuclei bilaterally.  Constellation of findings are most suggestive of osmotic demyelination syndrome.  Neurology following  On steroid, last dose 12/14/2023    History of hyponatremia  Likely secondary to HCTZ  Na stable at 135  Denies dizziness, confusion, headache  Will continue to monitor    Essential hypertension, hyperlipidemia  BP within acceptable range, latest BP  Denies chest pain, dizziness, headache  On Lipitor, Cozaar    GERD  [facility] managing acute needs. Patient will need to follow up with PCP for chronic disease management.    Time spent evaluating and intervening patient, 50 minutes. Greater than 70% of time spent focused exclusively on this patient, reviewing chart, reconciling medications, and answering questions and discussing treatment plan.    Electronically signed by PAUL Gonzalez CNP on 12/13/23 at 11:35 AM EST

## 2023-12-13 NOTE — CARE COORDINATION
Social/Functional Status:  Social/Functional History  Lives With: Alone (Staying with mom recently due to just getting out of hospital)  Type of Home: House  Home Layout: One level  Home Access: Stairs to enter with rails  Entrance Stairs - Number of Steps: 3  Entrance Stairs - Rails: Left (up)  Bathroom Shower/Tub: Tub/Shower unit, Walk-in shower, Doors (normally uses walk in)  [employer]: Standard  Bathroom Equipment: None  Bathroom Accessibility: Walker accessible  Home Equipment:  (PCP gave mother script for a wheelchair but they did not get any DME yet)  Has the patient had two or more falls in the past year or any fall with injury in the past year?: Yes  Receives Help From: Family  ADL Assistance: [address] Independent  Homemaking Responsibilities: Yes  Meal Prep Responsibility:  (pt sometimes cooks but mother mostly completes--pt would go to [facility] to eat)  Laundry Responsibility: Primary (first floor)  Cleaning Responsibility: Primary  Bill Paying/Finance Responsibility: Primary (wrote checks)  Shopping Responsibility: Primary  Health Care Management: Primary (used pill organizer)  Ambulation Assistance: Independent (normally without assistive device)  Transfer Assistance: Independent  Active : Yes  Mode of Transportation: [employer] ([facility])  Education: Masters in [address]  Occupation: Retired  Type of Occupation: RN in the [employer]  Additional Comments: pt was d/c from hospital 2 weeks ago, pt has been living with mother since, above is pt home set up, pt stated mother's home has one step to enter, tub shower and walk in shower    Spoke with patient and explained role in the team. Patient questions answered appropriately. Explained discharge process. Patient stated understanding. Pt's mother was also present for the assessment. 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  Pt goes by her middle

## 2023-12-13 NOTE — PROGRESS NOTES
[facility]   [facility]  Speech Language Pathology  Initial Speech/Language/Cognitive Assessment    NAME:Nancy Oates  : 1970 (52 y.o.)   [x]   confirmed    MRN: [id number]  ROOM: [place] [id number]  ADMISSION DATE: 2023  PATIENT DIAGNOSIS(ES): Impaired mobility and ADLs [Z74.09, Z78.9]  No chief complaint on file.     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    Alcohol dependence in remission ([address]) 2023    Pain in joint involving ankle and foot 2023    Current smoker 2023    Chronic pain 2023    Dysfunctional uterine bleeding 2023    Instability of ankle 2023    Alcohol dependence ([address]) 2023    Overactive bladder 2023    Panic disorder 2023    Spondylosis of lumbar spine 2023    Sinusitis 2023    Vitamin D deficiency 2023    Impaired mobility and activities of daily living dt osmotic demyelination syndrome 2023    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    Ataxic gait 2023    Dysarthria 2023    Alcohol abuse 2023    Recurrent falls 2023    Hyponatremia 2023    Bilateral primary osteoarthritis of hip 2018    Prediabetes 2018    Gastro-esophageal reflux disease without esophagitis 2017     Past Medical History:   Diagnosis Date    Anxiety     Heartburn     Hyperlipidemia     Hypertension     Sleep apnea      Past Surgical History:   Procedure Laterality Date    COLONOSCOPY N/A 2023    COLONOSCOPY DIAGNOSTIC performed by Jose Ventura MD at [facility]     TOTAL VAGINAL HYSTERECTOMY  2007    UPPER GASTROINTESTINAL ENDOSCOPY N/A 2023    EGD

## 2023-12-13 NOTE — CARE COORDINATION
[facility]  INPATIENT REHABILITATION  TEAM CONFERENCE NOTE  Room: R247/R247-01  Admit Date: 2023       Date: 2023  Patient Name: Nancy Jean        MRN: [id number]    : 1970  (53 y.o.)  Gender: female        REHAB DIAGNOSIS:   Diagnosis: Impaired mobility and activities of daily living dt osmotic demyelination syndrome    CO MORBIDITIES:      Past Medical History:   Diagnosis Date    Anxiety     Heartburn     Hyperlipidemia     Hypertension     Sleep apnea      Past Surgical History:   Procedure Laterality Date    COLONOSCOPY N/A 2023    COLONOSCOPY DIAGNOSTIC performed by Fabrice Vazquez MD at [facility]    HERNIA REPA[facility] Right     TOTAL VAGINAL HYSTERECTOMY  2007    UPPER GASTROINTESTINAL ENDOSCOPY N/A 2023    EGD DIAGNOSTIC ONLY performed by Fabrice Vazquez MD at [facility]        Restrictions  Restrictions/Precautions: Fall Risk  CASE MANAGEMENT    Social/Functional History  Social/Functional History  Lives With: Alone (stayed with mom recently secondary to hospital discharge)  Type of Home: House  Home Layout: One level  Home Access: Stairs to enter with rails  Entrance Stairs - Number of Steps: 3  Entrance Stairs - Rails: Left (up)  Bathroom Shower/Tub: Tub/Shower unit, Walk-in shower, Doors (normally uses walk in)  Bathroom Toilet: Standard  Bathroom Equipment: None  Bathroom Accessibility: Walker accessible  Home Equipment:  (PCP gave mother script for a wheelchair but they did not get any DME yet)  Has the patient had two or more falls in the past year or any fall with injury in the past year?: Yes  Receives Help From: Family  ADL Assistance: Independent  Homemaking Assistance: Independent  Homemaking Responsibilities: Yes  Meal Prep Responsibility:  (pt sometimes cooks but mother mostly completes--pt would go to mother's house to eat)  Laundry Responsibility: Primary  Cleaning Responsibility: Primary  Bill Paying/Finance Responsibility:  (touching) (12/13/23 1258)  Quality of Gait: Shuffling steps. coaching pt on visual imagery of striking target with B Les during swing phase with some noted improvement in step length and continuity of stride. (12/13/23 1258)  Distance: 50ft X 2 (12/13/23 1258)  Ambulation  Surface: Level surface; Uneven surface; Carpet (12/13/23 1417)  Device: Rolling walker (12/13/23 1417)  Distance: 25', 75', 2 x20', 15' (12/13/23 1417)  Activity: Within Unit; Within Room (12/13/23 1417)  Activity Comments: small shuffling steps initially (12/13/23 1417)  Additional Factors: Verbal cues; Increased time to complete (12/13/23 1417)  Assistance Level: Minimal assistance (touching assist) (12/13/23 1417)  Gait Deviations: Slow felix;Decreased step length bilateral;Decreased weight shift bilateral;Decreased heel strike right;Decreased heel strike left (12/13/23 1417)  Skilled Clinical Factors: vc's to improve step height and length, better carry over noted with external cues (12/13/23 1417)  Stairs:  Stairs/Curb  Stairs?: Yes (12/13/23 1258)  Stairs  # Steps : 4 (12/13/23 1258)  Stairs Height: 6\" (12/13/23 1258)  Rails: Bilateral (12/13/23 1258)  Assistance: Minimal assistance (12/13/23 1258)  Comment: Reciprocal pattern. Steadying assist provided for safety and cues for full foot on each step. (12/13/23 1258)  Stairs  Stair Height: 6'' (12/13/23 1417)  Device: Bilateral handrails (12/13/23 1417)  Number of Stairs: 4 (12/13/23 1417)  Additional Factors: Verbal cues; Reciprocal going up;Non-reciprocal going down; Increased time to complete (12/13/23 1417)  Assistance Level: Stand by assist;Minimal assistance (touching assist) (12/13/23 1417)  Skilled Clinical Factors: occasional touching assist for safety, vc's for sequencing with descent (12/13/23 1417)  W/C mobility:     LTG:  Long Term Goal 1: Pt to complete bed mobility with indep  Long Term Goal 2: Pt to complete transfers with indep  Long Term Goal 3: Pt to ambulate 150ft with LRD

## 2023-12-13 NOTE — PROGRESS NOTES
Physical Therapy Rehab Treatment Note  Facility/Department: [facility]  Room: [facility] [[email or web address]R247-01       NAME: Nancy Jean  : 1970 (53 y.o.)  MRN: [id number]  CODE STATUS: Full Code    Date of Service: 2023       Restrictions:  Restrictions/Precautions: Fall Risk       SUBJECTIVE:   Subjective: \"I'm tired\"    Pain  Pain: Denies pre and post session pain.      OBJECTIVE:         Bed Mobility  Additional Factors: Verbal cues;Increased time to complete;Head of bed flat;With handrails  Roll Right  Assistance Level: Stand by assist  Skilled Clinical Factors: uses bedrail to complete, increased effort to reach  Sit to Supine  Assistance Level: Stand by assist  Skilled Clinical Factors: increased time and effort, though no phyiscal assist required from therapist for completion    Transfers  Surface: Wheelchair;To bed;From bed;Standard toilet  Additional Factors: Verbal cues;Increased time to complete  Device: Walker  Sit to Stand  Assistance Level: Stand by assist  Skilled Clinical Factors: increased time and effort, vc's for sequencing  Stand to Sit  Assistance Level: Stand by assist  Skilled Clinical Factors: vc's for sequencing, reaches back and controls descent into wc    Ambulation  Surface: Level surface;Uneven surface;Carpet  Device: Rolling walker  Distance: 25', 75', 2 x20', 15'  Activity: Within Unit;Within Room  Activity Comments: small shuffling steps initially  Additional Factors: Verbal cues;Increased time to complete  Assistance Level: Minimal assistance (touching assist)  Gait Deviations: Slow felix;Decreased step length bilateral;Decreased weight shift bilateral;Decreased heel strike right;Decreased heel strike left  Skilled Clinical Factors: vc's to improve step height and length, better carry over noted with external cues    Stairs  Stair Height: 6''  Device: Bilateral handrails  Number of Stairs: 4  Additional Factors: Verbal cues;Reciprocal going up;Non-reciprocal going down;Increased time to  complete  Assistance Level: Stand by assist;Minimal assistance (touching assist)  Skilled Clinical Factors: occasional touching assist for safety, vc's for sequencing with descent      Timed Up and Go: 71         Activity Tolerance  Activity Tolerance: Patient tolerated treatment well;Patient limited by fatigue           ASSESSMENT/PROGRESS TOWARDS GOALS:   Assessment  Assessment: Treatment focused on improving functional mobility and gait, pt reports feeling limited secondary to increased fatigue this afternoon. Requires vc's as well as external cues for improving gait deviations and overall quality. Despite increased fatigue, pt required less physical assistance with transfers this session. Activity Tolerance: Patient tolerated treatment well;Patient limited by fatigue    Goals:  Long Term Goals  Long Term Goal 1: Pt to complete bed mobility with indep  Long Term Goal 2: Pt to complete transfers with indep  Long Term Goal 3: Pt to ambulate 150ft with LRD and indep  Long Term Goal 4: Pt to manage 3 steps with HR and indep  Long Term Goal 5: Pt to complete HEP with indep  Additional Goals?: Yes  Long term goal 6: Pt to complete TUG within 20sec  Patient Goals   Patient Goals : I want to be able to go home and be independent. PLAN OF CARE/Safety:   Safety Devices  Type of Devices: All fall risk precautions in place; Bed alarm in place;Call light within reach; Left in bed      Therapy Time:   Individual   Time In 1400   Time Out 1500   Minutes 60     Minutes:60  Transfer/Bed mobility trainin  Gait trainin  Neuro re education: 0  Therapeutic ex:0      Kelin Melchor PTA, 23 at 3:33 PM

## 2023-12-13 NOTE — H&P
HISTORY & PHYSICAL       DATE OF ADMISSION:  12/12/2023    DATE OF SERVICE:  12/13/23    Subjective:    Nancy Jean, 53 y.o. female presents today with:     CHIEF COMPLAINT:     Patient presented through the emergency room with falls and dizziness.  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  A CTA of the neck showed noncalcified plaque with posterior wall ulceration of the origin of the right ICA without stenosis.    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  It was felt to be an aftermath of patient's underlying alcoholism and hyponatremia with a sodium as low as 105 with correction.  It was felt that nutritional demyelination was also likely comorbidity.  Oropharyngeal dysphagia was monitored for period     Neurologic Problem  The patient's primary symptoms include an altered mental status, clumsiness, focal sensory loss, focal weakness and weakness. This is a chronic problem. The current episode started more than 1 month ago. The neurological problem developed insidiously. The problem is unchanged. There was no focality noted. Associated symptoms include back pain, bladder incontinence, confusion, dizziness, fatigue, light-headedness, neck pain and shortness of breath. Pertinent negatives include no abdominal pain, bowel incontinence, chest pain, diaphoresis, fever, headaches, nausea, palpitations or vomiting. Past treatments include walking, bed rest and medication. The treatment provided mild relief. Her past medical history is significant for dementia, liver disease and mood changes.       I reviewed recent nursing note, \" Report given to Ana WHITMAN from Rehab.   Pt assisted up to chair with walker.  Tolerated well.   VSS.  No distress noted.   Will continue with current careplan\".        The patient has stabilized  floor.    4.  Complex labs and x-rays were reviewed. I will review patient's old EKG and labs. 5.  Will provide emotional support for this patient regarding adjustment to their disability. Cognition and mood will be screened daily and addressed by rehabilitation psychology and/or speech therapy as appropriate. I have encouraged the patient to attend the Rehab Adjustment to Disability Support Group and recreational therapy. 6.  Estimated length of stay is   2-3 weeks. Discharge to home with help from family and home health PT, OT, RN, and aide. Patient should be independent at discharge. 7.  The patient's medical and rehab prognosis are good. [address] regarding the patient's back up to general medical needs. A welcome letter was presented with an explanation of my services, my specialty and what to expect during the rehabilitation process. As well as introducing myself, I also wrote my name on their bedside marker board with their name as well as the names of the other physicians with an explanation of our individual roles in their care, as well as the rehab process.             Jeffrey Conteh D.O., F.A.A.P.M.&MARY

## 2023-12-14 ENCOUNTER — APPOINTMENT (OUTPATIENT)
Dept: GENERAL RADIOLOGY | Age: 53
End: 2023-12-14
Attending: PHYSICAL MEDICINE & REHABILITATION
Payer: OTHER GOVERNMENT

## 2023-12-14 PROBLEM — R32 URINARY INCONTINENCE: Status: ACTIVE | Noted: 2023-12-14

## 2023-12-14 PROBLEM — R29.818 PARKINSONIAN FEATURES: Status: ACTIVE | Noted: 2023-12-14

## 2023-12-14 LAB
ANION GAP SERPL CALCULATED.3IONS-SCNC: 15 MEQ/L (ref 9–15)
BASOPHILS # BLD: 0 K/UL (ref 0–0.2)
BASOPHILS NFR BLD: 0.7 %
BUN SERPL-MCNC: 13 MG/DL (ref 6–20)
CALCIUM SERPL-MCNC: 9.4 MG/DL (ref 8.5–9.9)
CHLORIDE SERPL-SCNC: 104 MEQ/L (ref 95–107)
CO2 SERPL-SCNC: 21 MEQ/L (ref 20–31)
CREAT SERPL-MCNC: 0.47 MG/DL (ref 0.5–0.9)
EOSINOPHIL # BLD: 0 K/UL (ref 0–0.7)
EOSINOPHIL NFR BLD: 0 %
ERYTHROCYTE [DISTWIDTH] IN BLOOD BY AUTOMATED COUNT: 12.2 % (ref 11.5–14.5)
GLUCOSE SERPL-MCNC: 122 MG/DL (ref 70–99)
HCT VFR BLD AUTO: 35.3 % (ref 37–47)
HGB BLD-MCNC: 12 G/DL (ref 12–16)
LYMPHOCYTES # BLD: 1.1 K/UL (ref 1–4.8)
LYMPHOCYTES NFR BLD: 18.1 %
MCH RBC QN AUTO: 34.4 PG (ref 27–31.3)
MCHC RBC AUTO-ENTMCNC: 34 % (ref 33–37)
MCV RBC AUTO: 101.1 FL (ref 79.4–94.8)
MONOCYTES # BLD: 0.3 K/UL (ref 0.2–0.8)
MONOCYTES NFR BLD: 5.4 %
NEUTROPHILS # BLD: 4.6 K/UL (ref 1.4–6.5)
NEUTS SEG NFR BLD: 75.5 %
PLATELET # BLD AUTO: 329 K/UL (ref 130–400)
POTASSIUM SERPL-SCNC: 4.3 MEQ/L (ref 3.4–4.9)
RBC # BLD AUTO: 3.49 M/UL (ref 4.2–5.4)
SODIUM SERPL-SCNC: 140 MEQ/L (ref 135–144)
WBC # BLD AUTO: 6.1 K/UL (ref 4.8–10.8)

## 2023-12-14 PROCEDURE — 74230 X-RAY XM SWLNG FUNCJ C+: CPT

## 2023-12-14 PROCEDURE — 6370000000 HC RX 637 (ALT 250 FOR IP): Performed by: FAMILY MEDICINE

## 2023-12-14 PROCEDURE — 85025 COMPLETE CBC W/AUTO DIFF WBC: CPT

## 2023-12-14 PROCEDURE — 99233 SBSQ HOSP IP/OBS HIGH 50: CPT | Performed by: PHYSICAL MEDICINE & REHABILITATION

## 2023-12-14 PROCEDURE — 6360000002 HC RX W HCPCS: Performed by: FAMILY MEDICINE

## 2023-12-14 PROCEDURE — 92526 ORAL FUNCTION THERAPY: CPT

## 2023-12-14 PROCEDURE — 97535 SELF CARE MNGMENT TRAINING: CPT

## 2023-12-14 PROCEDURE — 80048 BASIC METABOLIC PNL TOTAL CA: CPT

## 2023-12-14 PROCEDURE — 97116 GAIT TRAINING THERAPY: CPT

## 2023-12-14 PROCEDURE — 97530 THERAPEUTIC ACTIVITIES: CPT

## 2023-12-14 PROCEDURE — 6370000000 HC RX 637 (ALT 250 FOR IP): Performed by: PHYSICAL MEDICINE & REHABILITATION

## 2023-12-14 PROCEDURE — 36415 COLL VENOUS BLD VENIPUNCTURE: CPT

## 2023-12-14 PROCEDURE — 92611 MOTION FLUOROSCOPY/SWALLOW: CPT

## 2023-12-14 PROCEDURE — 2500000003 HC RX 250 WO HCPCS: Performed by: PHYSICAL MEDICINE & REHABILITATION

## 2023-12-14 PROCEDURE — 97112 NEUROMUSCULAR REEDUCATION: CPT

## 2023-12-14 PROCEDURE — 1180000000 HC REHAB R&B

## 2023-12-14 RX ORDER — ACETAMINOPHEN 325 MG/1
325 TABLET ORAL EVERY 4 HOURS PRN
Status: DISCONTINUED | OUTPATIENT
Start: 2023-12-14 | End: 2023-12-30 | Stop reason: HOSPADM

## 2023-12-14 RX ADMIN — Medication 100 MCG: at 08:28

## 2023-12-14 RX ADMIN — FAMOTIDINE 20 MG: 20 TABLET ORAL at 20:40

## 2023-12-14 RX ADMIN — ENOXAPARIN SODIUM 40 MG: 100 INJECTION SUBCUTANEOUS at 08:29

## 2023-12-14 RX ADMIN — PANTOPRAZOLE SODIUM 40 MG: 40 TABLET, DELAYED RELEASE ORAL at 06:20

## 2023-12-14 RX ADMIN — POLYETHYLENE GLYCOL 3350 17 G: 17 POWDER, FOR SOLUTION ORAL at 16:35

## 2023-12-14 RX ADMIN — BARIUM SULFATE 80 ML: 400 SUSPENSION ORAL at 13:19

## 2023-12-14 RX ADMIN — DEXAMETHASONE 4 MG: 4 TABLET ORAL at 13:51

## 2023-12-14 RX ADMIN — FAMOTIDINE 20 MG: 20 TABLET ORAL at 08:28

## 2023-12-14 RX ADMIN — LOSARTAN POTASSIUM 25 MG: 25 TABLET, FILM COATED ORAL at 08:28

## 2023-12-14 RX ADMIN — Medication 10 MG: at 20:40

## 2023-12-14 RX ADMIN — ATORVASTATIN CALCIUM 10 MG: 10 TABLET, FILM COATED ORAL at 20:40

## 2023-12-14 RX ADMIN — ACETAMINOPHEN 325 MG: 325 TABLET ORAL at 20:39

## 2023-12-14 RX ADMIN — OXYBUTYNIN CHLORIDE 5 MG: 5 TABLET, EXTENDED RELEASE ORAL at 08:28

## 2023-12-14 RX ADMIN — BARIUM SULFATE 50 ML: 0.81 POWDER, FOR SUSPENSION ORAL at 13:19

## 2023-12-14 RX ADMIN — ACETAMINOPHEN 325 MG: 325 TABLET ORAL at 16:35

## 2023-12-14 RX ADMIN — DEXAMETHASONE 4 MG: 4 TABLET ORAL at 06:20

## 2023-12-14 ASSESSMENT — PAIN SCALES - GENERAL: PAINLEVEL_OUTOF10: 4

## 2023-12-14 ASSESSMENT — PAIN DESCRIPTION - LOCATION: LOCATION: BACK;HIP

## 2023-12-14 ASSESSMENT — PAIN DESCRIPTION - DESCRIPTORS: DESCRIPTORS: ACHING

## 2023-12-14 ASSESSMENT — PAIN DESCRIPTION - ORIENTATION: ORIENTATION: LEFT;LOWER

## 2023-12-14 NOTE — PROCEDURES
[facility]   Facility/Department: [facility]  Speech Language Pathology  Modified Barium Swallow (MBS)    Macarena Rey  : 1970 (52 y.o.)   [x]   confirmed    MRN: [id number]  [facility]  ADMISSION DATE: 2023  PATIENT DIAGNOSIS(ES): Impaired mobility and ADLs [Z74.09, Z78.9]  No chief complaint on file.     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    Alcohol dependence in remission ([address]) 2023    Pain in joint involving ankle and foot 2023    Current smoker 2023    Chronic pain 2023    Dysfunctional uterine bleeding 2023    Instability of ankle 2023    Alcohol dependence ([address]) 2023    Overactive bladder 2023    Panic disorder 2023    Spondylosis of lumbar spine 2023    Sinusitis 2023    Vitamin D deficiency 2023    Impaired mobility and activities of daily living dt osmotic demyelination syndrome 2023    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    Ataxic gait 2023    Dysarthria 2023    Alcohol abuse 2023    Recurrent falls 2023    Hyponatremia 2023    Bilateral primary osteoarthritis of hip 2018    Prediabetes 2018    Gastro-esophageal reflux disease without esophagitis 2017     Past Medical History:   Diagnosis Date    Anxiety     Heartburn     Hyperlipidemia     Hypertension     Sleep apnea      Past Surgical History:   Procedure Laterality Date    COLONOSCOPY N/A 2023    COLONOSCOPY DIAGNOSTIC performed by Josephine Rascon MD at [place] [facility]     TOTAL VAGINAL HYSTERECTOMY  2007 of bolus and improve tongue base retraction 10x/each with min cues in order to strengthen the muscles of the swallow and to safely handle the least restrictive diet level.  5. Pt will tolerate thermal gustatory stimulation in 100% of opportunities to stimulate the swallow and improve swallow onset time, with no overt s/s of difficulty or aspiration.        Prognosis for improvements is: Good, Motivation and Participation Level      Pain Assessment:  Patient c/o pain: Location and pain level: 2/10 left hip  Patient declined intervention.  Patient able to proceed with session.    Pain Re-assessment:  Patient c/o pain: Described as same as above    Safety Devices:  All fall risk precautions in place      DYSPHAGIA OUTCOMES SEVERITY SCALE:    SWALLOWING  Ratin      Radiologist:  Available on Consult as needed, Radiology Tech present    Treatment goals were discussed with the:   Patient., who gives verbal understanding of recommendations.    Thank you for your referral.  Please direct any questions or concerns to Speech Pathology.    Images are available through [facility]/PACS. Please see radiologist report for additional details.      Therapy Time  SLP Individual Minutes  Time In: 1305  Time Out: 1322  Minutes: 17          Signature:  Electronically signed by LENNY Mcclure on 2023 at 3:20 PM

## 2023-12-14 NOTE — FLOWSHEET NOTE
Patient alert, oriented and cooperative. Complains of hip pain, denies need for pain medication. Denies any further needs or complaints at this time. Call light within reach.

## 2023-12-14 NOTE — PROGRESS NOTES
OCCUPATIONAL THERAPY  INPATIENT REHAB TREATMENT NOTE  [facility]      NAME: Nancy Jean  : 1970 (53 y.o.)  MRN: [id number]  CODE STATUS: Full Code  Room: R2/R247-01    Date of Service: 2023    Referring Physician: Dr. 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 female presented with recurrent falls and inability to care for self. Recent history of electrolyte abnormalities. MRI of the brain showed results suggestive of osmotic demyelination syndrome otherwise no acute intracranial abnormality. 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      Restrictions  Restrictions/Precautions  Restrictions/Precautions: Fall Risk                 Patient's date of birth confirmed: Yes    SAFETY:  Safety Devices  Safety Devices in place: Yes  Type of devices: All fall risk precautions in place    SUBJECTIVE:       Pain at start/end of treatment: Yes: 2/10 L hip/Low back pain    Nursing notified: Yes  RN: [place]  Intervention: Pt nurse is aware but pt declined to take anything until after PT- per pt statement    COGNITION:  Orientation  Overall Orientation Status: Within Normal Limits  Orientation Level: Oriented X4  Cognition  Overall Cognitive Status: WFL      Pt's current cognitive status is:  Comprehension: Independent  Expression: Independent  Social Interaction: Independent  Problem Solving: Supervision  Memory: Independent    OBJECTIVE:         Feeding  Skilled Clinical Factors: none  Grooming/Oral Hygiene  Assistance Level: Supervision  Upper Extremity Bathing  Skilled Clinical Factors: none-declined  Lower Extremity Bathing  Assistance Level: Contact guard assist  Skilled Clinical Factors: perineal care  Lower Extremity Dressing  Assistance Level: Stand by assist  Putting On/Taking Off Footwear  Assistance Level: Moderate  assistance  Toileting  Assistance Level: Contact guard assist  Skilled Clinical Factors: CGA pant management: Mod Ind for all seated toilet hygiene  Toilet Transfers  Technique: Stand step  Equipment: Standard toilet;Grab bars  Additional Factors: Verbal cues  Assistance Level: Contact guard assist  Skilled Clinical Factors: touching assist for safety d/t balance, limited foot/gait movements  Tub/Shower Transfers  Skilled Clinical Factors: partial sponge bath- declined leg/foot washing but assisted with application of lotion on lower legs d/t severe dryness         Functional Mobility  Device: Rolling walker  Activity: To/From bathroom  Assistance Level: Contact guard assist  Skilled Clinical Factors: touching assist for safety d/t pt demo'ing limited hip/knee flexion pt clear feet from floor and limited foot stride and balance noted increasing risk of falls. Sit to Stand  Assistance Level: Contact guard assist  Stand to Sit  Assistance Level: Contact guard assist       ASSESSMENT:  Activity Tolerance: Patient tolerated treatment well      PLAN OF CARE:  Strengthening, Balance training, Functional mobility training, Endurance training, Safety education & training, Patient/Caregiver education & training, Equipment evaluation, education, & procurement, Neuromuscular re-education, Positioning, Home management training, Self-Care / ADL, Coordination training  Pt to continue OT POC    Patient goals : \"get my strength and dexterity back\"  Time Frame for Long Term Goals : Within 2 weeks, pt to demonstrate progress in the following areas below to achieve specific LTGs as stated in the initial evaluation  Long Term Goal 1: Increase functional endurance  Long Term Goal 2: increase strength  Long Term Goal 3: increase ADL status        Therapy Time:   Individual Group Co-Treat   Time In 1000       Time Out 1100         Minutes 60                 ADL/IADL trainin minutes     Electronically signed by:     MICHELA Amos

## 2023-12-14 NOTE — PROGRESS NOTES
[facility]  Facility/Department: [facility]  Speech Language Pathology   Treatment Note          Cleatus May  1970  Z467/C251-25  [x]   confirmed    Date: 2023      Restrictions/Precautions: Fall Risk    ADULT ORAL NUTRITION SUPPLEMENT; Dinner, Lunch; Frozen Oral Supplement  ADULT DIET; Regular    Respiratory Status:   RA  No active isolations    Rehab Diagnosis: Impaired mobility and activities of daily living dt osmotic demyelination syndrome     Subjective:  Alert, Cooperative, and Pleasant        Interventions used this date:  Cognitive Skill Development    Objective/Assessment:  Patient progressing towards goals:  Short-term Goals  Timeframe for Short-term Goals: 1-2 weeks  Goal 3: Pt will complete oral motor ROM and strengthening exercises with 90%  accuracy, given cues as needed, in order to strengthen lingual/labial/buccal musculature to promote safety and efficiency of oral phase of swallow and decrease risk for pocketing. Patient completed the following lingual strengthening exercises   Lingual pull back: X10   Lingual press: X10  Lingual scrape: X10    Goal 4: Patient will participate in an instrumental assessment to further assess the pharyngeal phase of the swallow mechanism. MBS was completed this date by SLP, Franky Gitelman. Per Franky Gitelman, Regular solids with thin liquids is recommended, no mixed consistencies, small sips and whole pills in puree. ST spoke with Yumiko Hart, Dietitian, and RN, Azeb Oshea, about the diet order. \"No mixed consistencies\" was added to the comment section of the diet order. Nonda Mower, to add soups to be blended and no cold cereal with milk. Patient and ST discussed MBS results and recommendations. Patient verbalized an understanding. ST discussed POC and goals including OM strengthening and improving initiation of the swallow.       Treatment/Activity Tolerance:  Patient tolerated treatment well    Plan:  Continue per POC    Pain Assessment:  Patient does not c/o

## 2023-12-14 NOTE — PROGRESS NOTES
Assessment completed. VSS. Pt is alert and oriented x4. Denies chest pain,sob or nausea at this time. Pt medicated per MAR. Call light within reach, bed in lowest position, and bed alarm on.

## 2023-12-14 NOTE — PROGRESS NOTES
Physical Therapy Rehab Treatment Note  Facility/Department: [facility]  Room: R247/R247-01       NAME: Gail Casillas  : 1970 (52 y.o.)  MRN: [id number]  CODE STATUS: Full Code    Date of Service: 2023       Restrictions:  Restrictions/Precautions: Fall Risk       SUBJECTIVE:   Subjective: \"I'm good\"    Pain  Pain: 2/10 pain in L hip and lower back, denies need for intervention at this time. OBJECTIVE:            Transfers  Surface: Wheelchair;Standard toilet  Additional Factors: Verbal cues; Increased time to complete;Hand placement cues  Device: Walker  Sit to Stand  Assistance Level: Stand by assist  Skilled Clinical Factors: increased time and effort, vc's for hand placement and sequencing  Stand to Sit  Assistance Level: Stand by assist  Skilled Clinical Factors: good carry over with reaching back and controls descent into wc    Ambulation  Surface: Level surface; Uneven surface; Carpet  Device: Rolling walker  Distance: 2 x100', 2 x15'  Activity: Within Unit; Within Room  Activity Comments: small shuffling steps initially - YTB around walker for second trial to cue pt for bigger steps  Additional Factors: Verbal cues; Increased time to complete  Assistance Level: Stand by assist  Gait Deviations: Slow felix;Decreased step length bilateral;Decreased weight shift bilateral;Decreased heel strike right;Decreased heel strike left; Wide base of support  Skilled Clinical Factors: vc's to improve step height and length, better carry over noted with external cues                  Activity Tolerance  Activity Tolerance: Patient tolerated treatment well       ASSESSMENT/PROGRESS TOWARDS GOALS:   Assessment  Assessment: Treatment focused on improving overall gait quality, pt displays short, shuffling steps intitially but responds well to external cues for improving step length and height. Good carry over of sequencing during transfers, improving efficiency noted as well.   Activity Tolerance: Patient tolerated

## 2023-12-14 NOTE — CARE COORDINATION
LSW met with pt and mother and provided update as well as discharge date. Pt and mother are hoping goals will be upgraded and that pt can reach independence.  Electronically signed by ROSEMARY Mccord, TAMMIE on 12/14/2023 at 5:58 PM

## 2023-12-14 NOTE — PROGRESS NOTES
Physical Therapy Rehab Treatment Note  [facility]  [facility]       NAME: Tyesha Khanna  : 1970 (2 y.o.)  MRN: [id number]  CODE STATUS: Full Code    Date of Service: 2023       Restrictions:  Restrictions/Precautions: Fall Risk       SUBJECTIVE:   Subjective: \"I had my swallow test\"    Pain  Pain: 2-3/10 pain in L hip and lower back, denies need for intervention. OBJECTIVE:            Transfers  Surface: Wheelchair  Additional Factors: Verbal cues; Increased time to complete;Hand placement cues  Device: Walker  Sit to Stand  Assistance Level: Stand by assist  Skilled Clinical Factors: increased time and effort, good carry over with hand placement and sequencing  Stand to Sit  Assistance Level: Stand by assist  Skilled Clinical Factors: good carry over with reaching back and controls descent into wc    Ambulation  Surface: Level surface; Uneven surface; Carpet  Device: Rolling walker  Distance: 120', 35', 75', 40'  Activity: Within Unit  Activity Comments: small shuffling steps initially, freezes with turns  Additional Factors: Verbal cues; Increased time to complete  Assistance Level: Stand by assist  Gait Deviations: Slow felix;Decreased step length bilateral;Decreased weight shift bilateral;Decreased heel strike right;Decreased heel strike left; Wide base of support  Skilled Clinical Factors: vc's to improve step height and length, better carry over noted with external cues    Stairs  Stair Height: 6''  Device: Bilateral handrails  Number of Stairs: 4  Additional Factors: Verbal cues; Reciprocal going up; Increased time to complete;Reciprocal going down  Assistance Level: Stand by assist  Skilled Clinical Factors: close SBA for safety, improved sequencing        PT Exercises  Functional Mobility Circuit Training: 3x STS - 38s, pt unable to complete further reps secondary to fatigue  Dynamic Sitting Balance Exercises: reach for cones outside NEGRO x10 ronald, seated trunk

## 2023-12-14 NOTE — PROGRESS NOTES
OCCUPATIONAL THERAPY  INPATIENT REHAB TREATMENT NOTE  [facility]      NAME: Lina Sue  : 1970 (52 y.o.)  MRN: [id number]  CODE STATUS: Full Code  Room: P983/I109-79    Date of Service: 2023    Referring Physician: Dr. Geno Rayo Diagnosis: Impaired mobility and ADLs d/t osmotic demyelination syndrome    Hospital course:   Comments: 52 y.o. female presented with recurrent falls and inability to care for self. Recent history of electrolyte abnormalities. MRI of the brain showed results suggestive of osmotic demyelination syndrome otherwise no acute intracranial abnormality. 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      Restrictions  Restrictions/Precautions  Restrictions/Precautions: Other (comment) (Pt on swallowing precautions)            Position Activity Restriction  Other position/activity restrictions: Pt can have thin liquids-sips. Pt cannot have mixed food/liquid such as cold cereal and milk, chicken noodle soup    Patient's date of birth confirmed: Yes    SAFETY:  Safety Devices  Safety Devices in place: Yes  Type of devices: All fall risk precautions in place    SUBJECTIVE:       Pain at start of treatment: Yes: 4/10 L hip, and back    Pain at end of treatment: Yes: 4/10    Nursing notified: Declined    COGNITION:  Orientation  Overall Orientation Status: Within Normal Limits  Orientation Level: Oriented X4  Cognition  Overall Cognitive Status: WFL      OBJECTIVE:    Pt completed leather/bead threading and fine motor peg board activity. Pt completed both tasks seated with Sup provided. Pt completed tasks with fair pacing and Fair+ dexterity/coordination noted. Pt completed task with BUE demo more coordination/dexterity concerns with RUE vs LUE hand. Pt demo some difficulty with pinching with R hand between 1st/2nd fingers.   Pt completed tasks to increase functional  pinch, dexterity, coordination, to improve

## 2023-12-14 NOTE — PROGRESS NOTES
Comprehensive Nutrition Assessment    Type and Reason for Visit:  Initial, Consult    Nutrition Recommendations/Plan:   Food and/or Nutrient Delivery: Continue Current Diet, Modify Oral Nutrition Supplement     Malnutrition Assessment:  Malnutrition Status:  Insufficient data (12/14/23 1311)    Context:  Social/Environmental Circumstances       Nutrition Assessment:    Pt presents with reported history of progressive weight loss over last 3-4 years (and as noted per EMR), with intermittent decreased appetite/intake. Pt reports improved appetite/intake x last 3-4 days, with no GI complaints at this time. Pt eating lunch well when visited. To modify supplement to frozen supplement bid with meals (to be compliant with current thick liquids restriction). Nutrition Related Findings:    PMH-htn, hld, GERD, etoh abuse; admitted to Rehab for 'severe acute on chronic progressive fatigue and  gait ataxia and quadriparesis'. Labs/meds reviewed. Pt receiving decadron, pepcid, ppi. BSE 12/13-regular consistancy with mildly thick liquids. Last BM noted 12/12. No edema noted. Wound Type: None       Current Nutrition Intake & Therapies:    Average Meal Intake: 51-75%  Average Supplements Intake: %  ADULT DIET; Regular; Mildly Thick (Nectar)  ADULT ORAL NUTRITION SUPPLEMENT; Dinner, Lunch; Frozen Oral Supplement (prev. ordered-standard high calorie supplement at [facility])    Anthropometric Measures:  Height:  5' 8\"  Ideal Body Weight (IBW): 140lb  Admission Body Weight: 72.6 kg (160 lb) (stated 12/8)  Current Body Weight: Please obtain actual weight  Current BMI (kg/m2):  24.33  Usual Body Weight: 86.2 kg (190 lb) ((6/2019 [address] [facility]); 172lb (8/17/2023 stated); 162lb (1/12/2023 stated);)                       BMI Categories: Normal Weight (BMI 18.5-24. 9)    Estimated Daily Nutrient Needs:  Energy Requirements Based On: Kcal/kg  Weight Used for Energy Requirements: Ideal  Energy (kcal/day): 7825-5780 (kg x 25-27)  Weight Used for

## 2023-12-15 PROCEDURE — 92526 ORAL FUNCTION THERAPY: CPT

## 2023-12-15 PROCEDURE — 99232 SBSQ HOSP IP/OBS MODERATE 35: CPT | Performed by: PSYCHIATRY & NEUROLOGY

## 2023-12-15 PROCEDURE — 6370000000 HC RX 637 (ALT 250 FOR IP): Performed by: PHYSICAL MEDICINE & REHABILITATION

## 2023-12-15 PROCEDURE — 6370000000 HC RX 637 (ALT 250 FOR IP): Performed by: NURSE PRACTITIONER

## 2023-12-15 PROCEDURE — 97112 NEUROMUSCULAR REEDUCATION: CPT

## 2023-12-15 PROCEDURE — 97110 THERAPEUTIC EXERCISES: CPT

## 2023-12-15 PROCEDURE — 97116 GAIT TRAINING THERAPY: CPT

## 2023-12-15 PROCEDURE — 97535 SELF CARE MNGMENT TRAINING: CPT

## 2023-12-15 PROCEDURE — 92507 TX SP LANG VOICE COMM INDIV: CPT

## 2023-12-15 PROCEDURE — 97129 THER IVNTJ 1ST 15 MIN: CPT

## 2023-12-15 PROCEDURE — 6360000002 HC RX W HCPCS: Performed by: FAMILY MEDICINE

## 2023-12-15 PROCEDURE — APPSS15 APP SPLIT SHARED TIME 0-15 MINUTES: Performed by: NURSE PRACTITIONER

## 2023-12-15 PROCEDURE — 6370000000 HC RX 637 (ALT 250 FOR IP): Performed by: FAMILY MEDICINE

## 2023-12-15 PROCEDURE — 1180000000 HC REHAB R&B

## 2023-12-15 RX ADMIN — ZOLPIDEM TARTRATE 5 MG: 5 TABLET ORAL at 20:36

## 2023-12-15 RX ADMIN — FAMOTIDINE 20 MG: 20 TABLET ORAL at 08:27

## 2023-12-15 RX ADMIN — LOSARTAN POTASSIUM 25 MG: 25 TABLET, FILM COATED ORAL at 08:26

## 2023-12-15 RX ADMIN — ATORVASTATIN CALCIUM 10 MG: 10 TABLET, FILM COATED ORAL at 20:33

## 2023-12-15 RX ADMIN — Medication 10 MG: at 20:33

## 2023-12-15 RX ADMIN — ACETAMINOPHEN 325 MG: 325 TABLET ORAL at 20:33

## 2023-12-15 RX ADMIN — ENOXAPARIN SODIUM 40 MG: 100 INJECTION SUBCUTANEOUS at 08:26

## 2023-12-15 RX ADMIN — PANTOPRAZOLE SODIUM 40 MG: 40 TABLET, DELAYED RELEASE ORAL at 06:38

## 2023-12-15 RX ADMIN — CARBIDOPA AND LEVODOPA 1 TABLET: 25; 100 TABLET ORAL at 17:03

## 2023-12-15 RX ADMIN — OXYBUTYNIN CHLORIDE 5 MG: 5 TABLET, EXTENDED RELEASE ORAL at 08:26

## 2023-12-15 RX ADMIN — FAMOTIDINE 20 MG: 20 TABLET ORAL at 20:33

## 2023-12-15 RX ADMIN — Medication 100 MCG: at 08:26

## 2023-12-15 ASSESSMENT — PAIN DESCRIPTION - LOCATION: LOCATION: HIP;BACK

## 2023-12-15 ASSESSMENT — PAIN SCALES - GENERAL
PAINLEVEL_OUTOF10: 3
PAINLEVEL_OUTOF10: 0
PAINLEVEL_OUTOF10: 0
PAINLEVEL_OUTOF10: 3

## 2023-12-15 ASSESSMENT — PAIN DESCRIPTION - ORIENTATION: ORIENTATION: RIGHT;LEFT

## 2023-12-15 ASSESSMENT — PAIN DESCRIPTION - DESCRIPTORS: DESCRIPTORS: ACHING

## 2023-12-15 NOTE — PROGRESS NOTES
Factors: SBA from w/c, Min A from shower chair                                 Education:  Education  Education Given To: Patient  Education Provided: Role of Therapy;Plan of Care;ADL Function; Safety  Education Provided Comments: pt's mother educated on assisting pt w/ showering and dressing, as well as allowing pt to do as much on her own as she can before assisting pt  Education Method: Verbal;Demonstration  Barriers to Learning: None  Education Outcome: Verbalized understanding    Equipment recommendations:  OT Equipment Recommendations  Other: continue to assess      ASSESSMENT:  Assessment: Pt demonstrated good motivation and functional mobility to participate in ADLs safely and independently. Pt is limited by decreased strength and increased fatigue, which results in increased time and Min-Max assist needed to complete tasks. Pt will benefit from continued OT to address these deficits to maximize safe and independent participation in ADLs. Activity Tolerance: Patient tolerated treatment well      PLAN OF CARE:  Strengthening, Balance training, Functional mobility training, Endurance training, Safety education & training, Patient/Caregiver education & training, Equipment evaluation, education, & procurement, Neuromuscular re-education, Positioning, Home management training, Self-Care / ADL, Coordination training  Pt to continue OT POC    Patient goals : \"get my strength and dexterity back\"  Time Frame for Long Term Goals : Within 2 weeks, pt to demonstrate progress in the following areas below to achieve specific LTGs as stated in the initial evaluation  Long Term Goal 1:  Increase functional endurance  Long Term Goal 2: increase strength  Long Term Goal 3: increase ADL status        Therapy Time:   Individual Group Co-Treat   Time In 0830       Time Out 0930         Minutes 60                   ADL/IADL trainin minutes     Electronically signed by:    Ned Mcbride OT,   12/15/2023, 10:35 AM

## 2023-12-15 NOTE — PROGRESS NOTES
OCCUPATIONAL THERAPY  INPATIENT REHAB TREATMENT NOTE  [facility]      NAME: Rajinder Aleman  : 1970 (52 y.o.)  MRN: [id number]  CODE STATUS: Full Code  Room: U058/S015-47    Date of Service: 12/15/2023    Referring Physician: Dr. 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 female presented with recurrent falls and inability to care for self. Recent history of electrolyte abnormalities. MRI of the brain showed results suggestive of osmotic demyelination syndrome otherwise no acute intracranial abnormality. 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 Pt cannot have mixed food/liquid such as cold cereal and milk, chicken noodle soup    Patient's date of birth confirmed: Yes    SAFETY:  Safety Devices  Safety Devices in place: Yes  Type of devices: All fall risk precautions in place    SUBJECTIVE:  Subjective: \"Thank you for rescuing me. \"    Pain at start of treatment: No    Pain at end of treatment: No    COGNITION:  Orientation  Overall Orientation Status: Within Functional Limits  Cognition  Overall Cognitive Status: WFL    OBJECTIVE:    Instrumental ADL's  Instrumental ADLs: Yes  Health Management  Health Management Level of Assistance: Minimal assistance  Health Management:   Medication Management Simulation Activity:  Patient completed simulation activity utilizing 7 provided medication bottles with written instruction to place a total of 74 beads into the provided weekly medication organizer. Patient with MAX difficulty opening medication bottles requiring assist to open 4 bottles. Patient with MIN difficulty opening organizer boxes.    Patient placed a total of 73 beads into organizer with 0 verbal cues and a total of 67 being correct.   Patient with MIN difficulty manipulating beads.   Patient able to securely close 1/7 caps on medication bottles.    ASSESSMENT: Patient pleasant while quietly concentrating on therapeutic activity throughout treatment session.    Activity Tolerance: Patient tolerated treatment well      PLAN OF CARE:  Strengthening, Balance training, Functional mobility training, Endurance training, Safety education & training, Patient/Caregiver education & training, Equipment evaluation, education, & procurement, Neuromuscular re-education, Positioning, Home management training, Self-Care / ADL, Coordination training    Pt to continue OT POC    Patient goals : \"get my strength and dexterity back\"  Time Frame for Long Term Goals : Within 2 weeks, pt to demonstrate progress in the following areas below to achieve specific LTGs as stated in the initial evaluation  Long Term Goal 1: Increase functional endurance  Long Term Goal 2: increase strength  Long Term Goal 3: increase ADL status        Therapy Time:   Individual Group Co-Treat   Time In 1330       Time Out 1400         Minutes 30                   ADL/IADL trainin minutes     Electronically signed by:    MICHELA Blair,   12/15/2023, 2:23 PM

## 2023-12-15 NOTE — PROGRESS NOTES
[facility]  Facility/Department: [facility]  Speech Language Pathology   Treatment Note          Alfreda Amado  1970  [id number]  [x]   confirmed    Date: 12/15/2023      Restrictions/Precautions: Fall Risk  Position Activity Restriction  Other position/activity restrictions: Pt can have thin liquids-sips. Pt cannot have mixed food/liquid such as cold cereal and milk, chicken noodle soup    ADULT ORAL NUTRITION SUPPLEMENT; Dinner, Lunch; Frozen Oral Supplement  ADULT DIET; Regular    Respiratory Status:   RA  No active isolations    Rehab Diagnosis: Impaired mobility and activities of daily living dt osmotic demyelination syndrome     Subjective:  Alert, Cooperative, and Pleasant        Interventions used this date:  Cognitive Skill Development, Oral Motor Treatment, and Voice Treatment    Objective/Assessment:  Patient progressing towards goals:  Short Term Goals  Time Frame for Short Term Goals: 1-2 weeks  Goal 1: Pt will complete OM drills paired with speech sounds to improve speech intelligibility x10/each and expression of their complex thoughts, needs, feelings, and ideas. Patient completed OM drills with reduced breath support, but good articulation with the following accuracy  Pa-pa-pa: X10  Ta-ta-ta: X10  Ma-melvi-la: X10  Pa-ta-ka: X10    Goal 5: Pt will use breath and speech together appropriately during structured activities in 80% of opportunities with min verbal cues. Patient completed 5 deep breaths. Patient attempted to sustain \"e\" X5. Patient was able to maintain \"e\" for 1-3 seconds. Patient counted 1-5 X5 with cueing to breath and project voice with moderate to severe reduced volume of voice. Goal 6:  Within 1-5 days of implementing the ST POC, the patient's functional reading and writing skills will be assessed in relation to executive functioning (e.g. bill paying, reading rx labels, completing personal information), with additional goals added to patient's POC as deemed

## 2023-12-15 NOTE — PROGRESS NOTES
12:43    Therapist was ambulating back from refilling personal water bottle when I noticed a patient sitting on the floor of hospital room 247. Chair alarm was not sounding. I entered the room and patient stated that she leaned forward and fell onto the floor. Patient denied hitting her head or being in distress. Therapist located [place] and [facility]. [facility] and [facility] assisted patient up into standing position and this therapist pushed w/c behind patient for patient to sit. Patient left seated in w/c with call light in reach, chair alarm in place and all needs met. Candace WHITMAN located and notified of event.     Electronically signed by MICHELA Rondon on 12/15/23 at 1:31 PM EST

## 2023-12-15 NOTE — PROGRESS NOTES
Physical Therapy Rehab Treatment Note  [place] [facility]  [facility]       NAME: Nancy Jean  : 1970 (53 y.o.)  MRN: [id number]  CODE STATUS: Full Code    Date of Service: 12/15/2023       Restrictions:  Restrictions/Precautions: Fall Risk  Position Activity Restriction  Other position/activity restrictions: Pt can have thin liquids-sips. Pt cannot have mixed food/liquid such as cold cereal and milk, chicken noodle soup       SUBJECTIVE:   Subjective: Pt reports she was trying to turn off her lights in her room and fell out of her wheelchair during lunch. Reports she did not hit her head and is not having new pain following the fall.    Pain  Pain: 2/10 pain in L hip and lower back, denies need for intervention.      OBJECTIVE:         Bed Mobility  Additional Factors: Verbal cues;Increased time to complete;Head of bed flat;With handrails  Roll Left  Assistance Level: Minimal assistance  Skilled Clinical Factors: assist to complete roll, uses bedrail to initiate  Roll Right  Assistance Level: Minimal assistance  Skilled Clinical Factors: uses bedrail to complete, assist to complete this date  Sit to Supine  Assistance Level: Stand by assist  Skilled Clinical Factors: increased time and effort, though no phyiscal assist required from therapist for completion  Supine to Sit  Assistance Level: Stand by assist  Skilled Clinical Factors: increased time and effort, no physical assist from therapist  Scooting  Assistance Level: Stand by assist  Skilled Clinical Factors: scooting to HOB/EOB, uses ronald bedrail to scoot to HOB.    Transfers  Surface: To bed;Standard toilet;Wheelchair  Additional Factors: Verbal cues;Increased time to complete;Hand placement cues  Device: Walker  Sit to Stand  Assistance Level: Stand by assist  Skilled Clinical Factors: improving efficiency and sequencing, good carry over with hand placement  Stand to Sit  Assistance Level: Stand by assist  Skilled Clinical Factors:  Minutes:30  Transfer/Bed mobility training: 15  Gait trainin  Neuro re education:0  Therapeutic ex:10      Dana Ying PTA, 12/15/23 at 4:19 PM

## 2023-12-15 NOTE — PLAN OF CARE
Problem: Safety - Adult  Goal: Free from fall injury  12/15/2023 1510 by Presley Douglass RN  Outcome: Progressing  12/15/2023 1301 by Presley Douglass RN  Outcome: Progressing  Flowsheets (Taken 12/15/2023 1301)  Free From Fall Injury: Instruct family/caregiver on patient safety  Note: Turn room lights off when when finished with care. Pt. Prefers a dark room  Educate patient call light for assistance in care needs. Problem: Discharge Planning  Goal: Discharge to home or other facility with appropriate resources  Outcome: Progressing     Problem: Pain  Goal: Verbalizes/displays adequate comfort level or baseline comfort level  12/15/2023 1510 by Presley Douglass RN  Outcome: Progressing  12/15/2023 1301 by Presley Douglass RN  Outcome: Progressing     Problem: Skin/Tissue Integrity  Goal: Absence of new skin breakdown  Description: 1. Monitor for areas of redness and/or skin breakdown  2. Assess vascular access sites hourly  3. Every 4-6 hours minimum:  Change oxygen saturation probe site  4. Every 4-6 hours:  If on nasal continuous positive airway pressure, respiratory therapy assess nares and determine need for appliance change or resting period. 12/15/2023 1510 by Presley Douglass RN  Outcome: Progressing  12/15/2023 1301 by Presley Douglass RN  Outcome: Progressing  Note: Abrasion to right and left knee paint with betadine. Educate patient to apply lubriderm      Problem: Nutrition Deficit:  Goal: Optimize nutritional status  Outcome: Progressing     Problem: Discharge Planning  Goal: Discharge to home or other facility with appropriate resources  Outcome: Progressing     Problem: Pain  Goal: Verbalizes/displays adequate comfort level or baseline comfort level  12/15/2023 1510 by Presley Douglass RN  Outcome: Progressing  12/15/2023 1301 by Presley Douglass RN  Outcome: Progressing     Problem: Skin/Tissue Integrity  Goal: Absence of new skin breakdown  Description: 1.

## 2023-12-15 NOTE — PROGRESS NOTES
Pt. Found by the MICHELA on the floor. Pt. Evaluated. She expressed she didn't want to bother me and was only attempting to turn her overhead lights off. Denies injury full ROM. Vitals evaluated. Physician updated. . 1 cm circular abrasion to right knee. Neurologist on the unit for evaluation of the patient as well. POC updated. Will put a note on door to turn overhead lights off following care. Pt is able to reach the behind the bed lights and has call light within reach. Pt states she won't do it again. Pt. Preferred to tell her mother herself. Will continue to assess for patient needs.

## 2023-12-15 NOTE — PROGRESS NOTES
[facility] Neurology Daily Progress Note  Name: Nancy Jean  Age: 53 y.o.  Gender: female  CodeStatus: Full Code  Allergies: Hydrochlorothiazide    Chief Complaint:No chief complaint on file.    Primary Care Provider: Dexter Mccain MD  InpatientTreatment Team: Treatment Team: Attending Provider: Chikis Cain DO; Consulting Physician: Nirmal Gimenez MD; Consulting Physician: Mathieu Olguin MD; Consulting Physician: Goldberg, Zev, PhD; Registered Nurse: Bethany Hooks, RN; Registered Nurse: Maira Porter RN; Occupational Therapist: Quincy Cruz OT; Occupational Therapist Assistant: Chacha Mohr OTA; : Melissa Bragg MSW, LSW  Admission Date: 12/12/2023      HPI   Pt seen and examined on rehab for neurology follow-up.  Alert and oriented x 2, impulsive.  Severely bradykinetic.  Flat affect.  Rigidity noted.  Gait ataxia.  No reported hallucinations.  Sodium normal yesterday at 140.  No seizure activity reported.  Patient seen and examined and still significantly bradykinetic  Vitals:    12/15/23 1245   BP: 114/79   Pulse: 91   Resp: 16   Temp: 97.5 °F (36.4 °C)   SpO2:         Review of Systems   Constitutional:  Negative for appetite change, chills, fatigue and fever.   HENT:  Negative for hearing loss and trouble swallowing.    Eyes:  Negative for visual disturbance.   Respiratory:  Negative for cough, chest tightness, shortness of breath and wheezing.    Cardiovascular:  Negative for chest pain, palpitations and leg swelling.   Gastrointestinal:  Negative for nausea and vomiting.   Musculoskeletal:  Positive for gait problem.   Skin:  Negative for color change and rash.   Neurological:  Positive for speech difficulty and weakness. Negative for dizziness, tremors, seizures, syncope, facial asymmetry, light-headedness, numbness and headaches.   Psychiatric/Behavioral:  Positive for behavioral problems and confusion. Negative for agitation and hallucinations. The patient is not  No results found for this or any previous visit. No results found for this or any previous visit. No results found for this or any previous visit. CT of the Head: Results for orders placed during the hospital encounter of 12/08/23    CT Head W/O Contrast    Narrative  EXAMINATION:  CT OF THE HEAD WITHOUT CONTRAST  12/8/2023 10:09 am    TECHNIQUE:  CT of the head was performed without the administration of intravenous  contrast. Automated exposure control, iterative reconstruction, and/or weight  based adjustment of the mA/kV was utilized to reduce the radiation dose to as  low as reasonably achievable. COMPARISON:  None. HISTORY:  ORDERING SYSTEM PROVIDED HISTORY: Weakness  TECHNOLOGIST PROVIDED HISTORY:  Reason for exam:->Weakness  Has a \"code stroke\" or \"stroke alert\" been called? ->No  Decision Support Exception - unselect if not a suspected or confirmed  emergency medical condition->Emergency Medical Condition (MA)  What reading provider will be dictating this exam?->CRC    FINDINGS:  Central hypoattenuation is noted within the ishan on image 40 and image 13 of  indeterminate age or etiology. This would be an unexpected finding in a  patient of this age. No prior studies are available for comparison. There  is no evidence for hydrocephalus. The basilar cisterns are patent. The  visualized paranasal sinuses and mastoid air cells are normally aerated. Atheromatous plaque affects the carotid siphons. Mild bifrontal parenchymal  volume loss is identified. Impression  Age indeterminate abnormality within the ishan. MRI of the brain is  recommended for further evaluation. No prior studies are available for  comparison. The findings were called to the emergency department at the time  of dictation. No results found for this or any previous visit. No results found for this or any previous visit. Carotid duplex: No results found for this or any previous visit.   No

## 2023-12-15 NOTE — PROGRESS NOTES
Physical Therapy Rehab Treatment Note  [facility]Department[facility]  [facility]       NAME: Tyesha Khanna  : 1970 (62 y.o.)  MRN: [id number]  CODE STATUS: Full Code    Date of Service: 12/15/2023       Restrictions:  Restrictions/Precautions: Fall Risk  Position Activity Restriction  Other position/activity restrictions: Pt can have thin liquids-sips. Pt cannot have mixed food/liquid such as cold cereal and milk, chicken noodle soup       SUBJECTIVE:   Subjective: \"I've got my mom with me\"    Pain  Pain: 2/10 pain in L hip and lower back reported, pt states nursing is aware. Previously medicated. OBJECTIVE:         Sit to Supine  Assistance Level: Stand by assist  Skilled Clinical Factors: increased time and effort, though no phyiscal assist required from therapist for completion  Scooting  Assistance Level: Stand by assist  Skilled Clinical Factors: scooting to [facility], uses ronald bedrail to complete    Transfers  Surface: Wheelchair;Standard toilet  Additional Factors: Verbal cues; Increased time to complete;Hand placement cues  Device: Walker  Sit to Stand  Assistance Level: Stand by assist  Skilled Clinical Factors: improving efficiency and sequencing, vc's for hand placement  Stand to Sit  Assistance Level: Stand by assist  Skilled Clinical Factors: good carry over with reaching back and controls descent into wc    Ambulation  Surface: Level surface; Uneven surface; Carpet  Device: Rolling walker  Distance: 45', 90', 110', 2 x15'  Activity: Within Unit  Activity Comments: better step length noted this session, freezes with turns  Additional Factors: Verbal cues; Increased time to complete  Assistance Level: Stand by assist  Gait Deviations: Slow felix;Decreased step length bilateral;Decreased weight shift bilateral;Decreased heel strike right;Decreased heel strike left; Wide base of support  Skilled Clinical Factors: vc's and RTB around ww to encourage pt to improve step height and

## 2023-12-16 PROCEDURE — 6370000000 HC RX 637 (ALT 250 FOR IP): Performed by: NURSE PRACTITIONER

## 2023-12-16 PROCEDURE — 1180000000 HC REHAB R&B

## 2023-12-16 PROCEDURE — 97535 SELF CARE MNGMENT TRAINING: CPT

## 2023-12-16 PROCEDURE — 5A09457 ASSISTANCE WITH RESPIRATORY VENTILATION, 24-96 CONSECUTIVE HOURS, CONTINUOUS POSITIVE AIRWAY PRESSURE: ICD-10-PCS | Performed by: PHYSICAL MEDICINE & REHABILITATION

## 2023-12-16 PROCEDURE — 97112 NEUROMUSCULAR REEDUCATION: CPT

## 2023-12-16 PROCEDURE — 97110 THERAPEUTIC EXERCISES: CPT

## 2023-12-16 PROCEDURE — 97530 THERAPEUTIC ACTIVITIES: CPT

## 2023-12-16 PROCEDURE — 6370000000 HC RX 637 (ALT 250 FOR IP): Performed by: PHYSICAL MEDICINE & REHABILITATION

## 2023-12-16 PROCEDURE — 6370000000 HC RX 637 (ALT 250 FOR IP): Performed by: FAMILY MEDICINE

## 2023-12-16 PROCEDURE — 97116 GAIT TRAINING THERAPY: CPT

## 2023-12-16 PROCEDURE — 6360000002 HC RX W HCPCS: Performed by: FAMILY MEDICINE

## 2023-12-16 RX ORDER — SENNOSIDES A AND B 8.6 MG/1
1 TABLET, FILM COATED ORAL NIGHTLY
Status: DISCONTINUED | OUTPATIENT
Start: 2023-12-16 | End: 2023-12-30 | Stop reason: HOSPADM

## 2023-12-16 RX ADMIN — FAMOTIDINE 20 MG: 20 TABLET ORAL at 20:45

## 2023-12-16 RX ADMIN — ATORVASTATIN CALCIUM 10 MG: 10 TABLET, FILM COATED ORAL at 20:45

## 2023-12-16 RX ADMIN — Medication 100 MCG: at 09:40

## 2023-12-16 RX ADMIN — OXYBUTYNIN CHLORIDE 5 MG: 5 TABLET, EXTENDED RELEASE ORAL at 09:39

## 2023-12-16 RX ADMIN — FAMOTIDINE 20 MG: 20 TABLET ORAL at 09:39

## 2023-12-16 RX ADMIN — CARBIDOPA AND LEVODOPA 1 TABLET: 25; 100 TABLET ORAL at 06:09

## 2023-12-16 RX ADMIN — ENOXAPARIN SODIUM 40 MG: 100 INJECTION SUBCUTANEOUS at 09:39

## 2023-12-16 RX ADMIN — CARBIDOPA AND LEVODOPA 1 TABLET: 25; 100 TABLET ORAL at 14:13

## 2023-12-16 RX ADMIN — MAGNESIUM HYDROXIDE 15 ML: 1200 LIQUID ORAL at 14:13

## 2023-12-16 RX ADMIN — Medication 10 MG: at 20:45

## 2023-12-16 RX ADMIN — LOSARTAN POTASSIUM 25 MG: 25 TABLET, FILM COATED ORAL at 09:40

## 2023-12-16 RX ADMIN — SENNOSIDES 8.6 MG: 8.6 TABLET, FILM COATED ORAL at 20:45

## 2023-12-16 RX ADMIN — ACETAMINOPHEN 325 MG: 325 TABLET ORAL at 12:36

## 2023-12-16 RX ADMIN — ZOLPIDEM TARTRATE 5 MG: 5 TABLET ORAL at 22:34

## 2023-12-16 RX ADMIN — PANTOPRAZOLE SODIUM 40 MG: 40 TABLET, DELAYED RELEASE ORAL at 06:09

## 2023-12-16 ASSESSMENT — PAIN SCALES - GENERAL
PAINLEVEL_OUTOF10: 3
PAINLEVEL_OUTOF10: 4

## 2023-12-16 ASSESSMENT — PAIN DESCRIPTION - LOCATION: LOCATION: BACK

## 2023-12-16 ASSESSMENT — PAIN DESCRIPTION - DESCRIPTORS: DESCRIPTORS: ACHING

## 2023-12-16 ASSESSMENT — PAIN - FUNCTIONAL ASSESSMENT: PAIN_FUNCTIONAL_ASSESSMENT: ACTIVITIES ARE NOT PREVENTED

## 2023-12-16 ASSESSMENT — PAIN DESCRIPTION - ORIENTATION: ORIENTATION: LEFT

## 2023-12-16 NOTE — PROGRESS NOTES
Progress Note    Date:12/16/2023       [facility]  Patient Gabriel Levine     YOB: 1970     Age:53 y.o. Assessment        Hospital Problems             Last Modified POA    * (Principal) Impaired mobility and activities of daily living dt osmotic demyelination syndrome 12/13/2023 Yes    Anxiety 12/13/2023 Yes    Hyperlipidemia 12/13/2023 Yes    Hypertension 12/13/2023 Yes    Sleep apnea 12/13/2023 Yes    Hyponatremia 12/13/2023 Yes    Recurrent falls 12/13/2023 Yes    Current smoker 12/13/2023 Yes    Chronic pain 12/13/2023 Yes    Overactive bladder 12/13/2023 Yes    Vitamin D deficiency 12/13/2023 Yes    Osmotic demyelination syndrome ([address]) 12/13/2023 Yes    Ataxic gait 12/13/2023 Yes    Alcohol abuse 12/13/2023 Yes    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 Timothy Morel managing  Continue PT and OT     Osmotic demyelination syndrome, large central pontine myelosis  MRI of the brain showed a central T2 hyperintensity within the ishan with peripheral restricted diffusion with symmetric T2 hyperintensity involving the deep gray nuclei bilaterally. Constellation of findings are most suggestive of osmotic demyelination syndrome. Neurology following  Completed steroid therapy on 12/14/2023     History of hyponatremia - resolved  Likely secondary to HCTZ  Na stable at 140  Denies dizziness, confusion, headache  Will continue to monitor     Essential hypertension, hyperlipidemia  BP within acceptable range, latest /79  Denies chest pain, dizziness, headache  On Lipitor, Cozaar     GERD  On Protonix, [facility] medicine managing acute needs.  Patient will need to follow up with PCP Cancer Neg Hx        Physical Examination      Vitals:  /79   Pulse 68   Temp 97.5 °F (36.4 °C)   Resp 16   Ht 1.727 m (5' 7.99\")   Wt 70 kg (154 lb 4.8 oz)   LMP 2007 (Exact Date)   SpO2 100%   BMI 23.47 kg/m²   Temp (24hrs), Av.6 °F (36.4 °C), Min:97.5 °F (36.4 °C), Max:97.7 °F (36.5 °C)      I/O (24Hr):    Intake/Output Summary (Last 24 hours) at 2023 0959  Last data filed at 2023 0611  Gross per 24 hour   Intake 300 ml   Output 300 ml   Net 0 ml     CONSTITUTIONAL:  awake, alert, cooperative, no apparent distress, ill-appearing, and appears stated age  EYES:  sclera clear  ENT:  normocepalic, without obvious abnormality  NECK:  supple, symmetrical, trachea midline  BACK:  symmetric  LUNGS:  No increased work of breathing, good air exchange, clear to auscultation bilaterally, no crackles or wheezing  CARDIOVASCULAR:  regular rate and rhythm, normal S1 and S2,  and no murmur noted  ABDOMEN:  normal bowel sounds, non-distended, and non-tender  MUSCULOSKELETAL:  there is no redness, warmth, or swelling of the joints.   NEUROLOGIC:  Awake, alert, oriented to name, place and time.  Positive for weakness.  SKIN:  normal skin color, texture, turgor and no redness, warmth, or swelling    Labs/Imaging/Diagnostics   Labs:  CBC:  Recent Labs     23  0509   WBC 6.1   RBC 3.49*   HGB 12.0   HCT 35.3*   .1*   RDW 12.2        CHEMISTRIES:  Recent Labs     23  0509      K 4.3      CO2 21   BUN 13   CREATININE 0.47*   GLUCOSE 122*     PT/INR:No results for input(s): \"PROTIME\", \"INR\" in the last 72 hours.  APTT:No results for input(s): \"APTT\" in the last 72 hours.  LIVER PROFILE:No results for input(s): \"AST\", \"ALT\", \"BILIDIR\", \"BILITOT\", \"ALKPHOS\" in the last 72 hours.    Imaging Last 24 Hours:  Fluoroscopy modified barium swallow with video    Result Date: 2023  EXAMINATION: MODIFIED BARIUM SWALLOW WAS PERFORMED IN CONJUNCTION WITH SPEECH PATHOLOGY

## 2023-12-16 NOTE — PLAN OF CARE
Problem: Safety - Adult  Goal: Free from fall injury  12/15/2023 2312 by Cori Vazquez RN  Outcome: Progressing  12/15/2023 1510 by Tally Loges., RN  Outcome: Progressing  12/15/2023 1301 by Tally Loges., RN  Outcome: Progressing  Flowsheets (Taken 12/15/2023 1301)  Free From Fall Injury: Instruct family/caregiver on patient safety  Note: Turn room lights off when when finished with care. Pt. Prefers a dark room  Educate patient call light for assistance in care needs.

## 2023-12-16 NOTE — PROGRESS NOTES
Pt assessment completed. VSS. Pt C/O pain lower mid back and bilateral hips (chronic) @\"3\" medicated with tylenol. LBM 12/14. Pt is A&Ox4, slow to respond, flat and guarded. Lungs very diminished. 1+ BLE edema. Pt was incontinent small amount urine-pericare completed. Pur wic attached william clear urine noted. Rechecked pulse ox @ 0200, pt was asleep easily aroused. Pulse ox was 96%. Family to bring in pt CPAP. Call light with in reach, bed low locked and bed alarm activated.

## 2023-12-16 NOTE — PROGRESS NOTES
vertebral artery.  The large left posterior communicating artery is associated with absence of the P1 segment of the left PCA, a common variant of normal.  I cannot definitely identify the right posterior communicating artery, a variant of normal. OTHER: No dural venous sinus thrombosis on this non-dedicated study. BRAIN: No mass effect or midline shift. No extra-axial fluid collection. The gray-white differentiation is maintained.     CTA of the neck shows noncalcified plaque with posterior wall ulceration of the origin of the right internal carotid artery without stenosis using NASCET criteria. Less than 50% stenosis of the proximal left internal carotid artery using NASCET criteria. 50% stenosis of the origin of the dominant right vertebral artery. The slightly smaller caliber non dominant left vertebral artery is widely patent. CTA of the head shows no clinically significant abnormality with no sign of large vessel occlusion.  Anatomic variation as described above.     CTA NECK  12/8/2023  CTA NECK: AORTIC ARCH/ARCH VESSELS: No dissection or arterial injury.  No significant stenosis of the brachiocephalic or subclavian arteries. CAROTID ARTERIES: Right carotid bifurcation: Mild noncalcified plaque with subtle posterior wall ulceration at the origin of the internal carotid artery, without stenosis using NASCET criteria. Left carotid bifurcation: Heavy calcified plaque of the anterior and posterior walls of the origin and proximal portion of the left internal carotid artery with less than 50% stenosis of the proximal left internal carotid artery using NASCET criteria. VERTEBRAL ARTERIES: The dominant right vertebral artery has 50% stenosis of its origin from a calcified plaque.  The rest of the right vertebral artery is widely patent. The slightly smaller caliber non dominant left vertebral artery is widely patent. SOFT TISSUES: The lung apices are clear.  No cervical or superior mediastinal lymphadenopathy.  The  signs every shift focusing on heart rate, rhythm and blood pressure checks with orthostatic checks-monitoring the effect of exercise, therapy and posture. Consult hospitalist for backup medical and adjust/add medications. Monitor heart rate and blood pressure as well as medications effects on vital signs before during and after therapy with especial focus on preventing orthostasis and falls risk. Sleep apnea-monitor overnight pulse ox provide supplemental O2 as needed    Hyponatremia-avoid excessive fluids avoid alcohol monitor sodium level    Recurrent falls,   Ataxic gait-focus on balance and therapy    Current smoker-NicoDerm patch    Chronic pain-topicals and lowest effective dose of pain medication    Overactive bladder-check postvoid residuals    Vitamin D deficiency-Add high-dose vitamin D, recheck vitamin D level out after discharge    Osmotic demyelination syndrome,   Central pontine myelinolysis     Alcohol abuse-avoid alcohol use provide emotional support encouraged12-step program       Focus of today's plan-  Initiate and modify therapuetic plan to meet patients individual needs, add rest breaks as needed, and Focus on reassessing rehab goals and coordinating therapy and medical self care issues.     Nasreen De Santiago D.O., PM&R     Attending    [address] [place] [address]

## 2023-12-16 NOTE — PROGRESS NOTES
Physical Therapy Rehab Treatment Note  [facility]  [facility]       NAME: Rene Rodgers  : 1970 (19 y.o.)  MRN: [id number]  CODE STATUS: Full Code    Date of Service: 2023       Restrictions:  Restrictions/Precautions: Fall Risk  Position Activity Restriction  Other position/activity restrictions: Pt can have thin liquids-sips. Pt cannot have mixed food/liquid such as cold cereal and milk, chicken noodle soup       SUBJECTIVE:   Subjective: Patient arrives to therapy, agreeable to tx. States she didn't sleep well last night. Pain  Pain: 0/10 pre or post session    OBJECTIVE:         Transfers  Surface: From mat;From chair with arms; To mat; To chair with arms  Additional Factors: Verbal cues; Increased time to complete;Hand placement cues  Device: Walker  Sit to Stand  Assistance Level: Stand by assist  Skilled Clinical Factors: cont good carryover for hand placement in tf, vc needed only for scoot fwd in seat before attempt to stand  Stand to Sit  Assistance Level: Stand by assist  Skilled Clinical Factors: vc cues and intermit touching to complete safe approach to chair with vc of \"feel chair on both legs before attempt to sit\" continued good carry over with reaching back and controls descent into [facility] Transfer  Assistance Level: Stand by assist  Skilled Clinical Factors: vc for technique with good carryover, increased time to complete    Ambulation  Surface: Level surface; Ramp  Device: Rolling walker  Distance: 150 feet  Activity: Within Unit  Additional Factors: Verbal cues; Increased time to complete  Assistance Level: Stand by assist  Gait Deviations: Slow felix;Decreased step length bilateral;Decreased weight shift bilateral;Decreased heel strike right;Decreased heel strike left; Wide base of support  Skilled Clinical Factors: gtb wrap for increased proprioception, vc \"reach with heel\" improves step length and hs vs foot flat, fading carryover with fatigue and

## 2023-12-16 NOTE — PROGRESS NOTES
Physical Therapy Rehab Treatment Note  [facility]  [facility]       NAME: Nancy Jean  : 1970 (53 y.o.)  MRN: [id number]  CODE STATUS: Full Code    Date of Service: 2023     Restrictions:  Restrictions/Precautions: Fall Risk  Position Activity Restriction  Other position/activity restrictions: Pt can have thin liquids-sips. Pt cannot have mixed food/liquid such as cold cereal and milk, chicken noodle soup    SUBJECTIVE:   Subjective: Patient arrives to therapy, agreeable to tx. States she didn't sleep well last night.    Pain  Pain: 0/10 pre or post session    OBJECTIVE:        Transfers  Surface: From mat;From chair with arms;To mat;To chair with arms  Additional Factors: Verbal cues;Increased time to complete;Hand placement cues  Device: Walker  Sit to Stand  Assistance Level: Stand by assist  Skilled Clinical Factors: cont good carryover for hand placement in tf, vc needed only for scoot fwd in seat before attempt to stand  Stand to Sit  Assistance Level: Stand by assist  Skilled Clinical Factors: vc cues and intermit touching to complete safe approach to chair with vc of \"feel chair on both legs before attempt to sit\" continued good carry over with reaching back and controls descent into wc  Car Transfer  Assistance Level: Stand by assist  Skilled Clinical Factors: vc for technique with good carryover, increased time to complete    Ambulation  Surface: Level surface;Ramp  Device: Rolling walker  Distance: 200 feet  Activity: Within Unit  Additional Factors: Verbal cues;Increased time to complete  Assistance Level: Stand by assist  Gait Deviations: Slow felix;Decreased step length bilateral;Decreased weight shift bilateral;Decreased heel strike right;Decreased heel strike left;Wide base of support  Skilled Clinical Factors: gtb wrap for increased proprioception, vc \"reach with heel\" improves step length and hs vs foot flat, fading carryover with fatigue and  turns    Stairs  Stair Height: 6''  Device: Bilateral handrails  Number of Stairs: 4  Additional Factors: Verbal cues; Reciprocal going up; Increased time to complete;Reciprocal going down  Assistance Level: Stand by assist  Skilled Clinical Factors: continued close SBA for safety, improved sequencing       PT Exercises  Motor Control/Coordination: step fwd and back with focus on hs and greater step length, ronald with gtb for improved proprioception, improved gait step length following drills        Activity Tolerance  Activity Tolerance: Patient limited by endurance       ASSESSMENT/PROGRESS TOWARDS GOALS:   Assessment  Assessment: Patient primarily worked toward gait and stair goals this session. GTB wrap for ankle-knee-ankle facilitation in gait and standing motor control and coordination. Patient motivated and pleasant throughout session, eager to improve. Patient would benefit from cont skilled therapy to cont improving toward all functional goals and return to plof. Activity Tolerance: Patient limited by endurance  Discharge Recommendations: Patient would benefit from continued therapy after discharge;Continue to assess pending progress    Goals:  Long Term Goals  Long Term Goal 1: Pt to complete bed mobility with indep  Long Term Goal 2: Pt to complete transfers with indep  Long Term Goal 3: Pt to ambulate 150ft with LRD and indep  Long Term Goal 4: Pt to manage 3 steps with HR and indep  Long Term Goal 5: Pt to complete HEP with indep  Additional Goals?: Yes  Long term goal 6: Pt to complete TUG within 20sec  Patient Goals   Patient Goals : I want to be able to go home and be independent.     PLAN OF CARE/Safety:      All precautions in place    Therapy Time:   Individual   Time In 1000   Time Out 1100   Minutes 60      Minutes:60  Transfer/Bed mobility training:10  Gait trainin  Neuro re education:10  Therapeutic ex:15      Haley Single, PTA, 23 at 12:51 PM

## 2023-12-16 NOTE — PROGRESS NOTES
before attempt to sit\" continued good carry over with reaching back and controls descent into wc (12/16/23 1045)  Car Transfer  Assistance Level: Stand by assist (12/16/23 1045)  Skilled Clinical Factors: vc for technique with good carryover, increased time to complete (12/16/23 1045)  Gait:   Ambulation  Surface: Level tile (12/13/23 1258)  Device: Rolling Walker (12/13/23 1258)  Assistance:  (touching) (12/13/23 1258)  Quality of Gait: Shuffling steps. coaching pt on visual imagery of striking target with B Les during swing phase with some noted improvement in step length and continuity of stride. (12/13/23 1258)  Distance: 50ft X 2 (12/13/23 1258)  Ambulation  Surface: Level surface; Ramp (12/16/23 1031)  Device: Rolling walker (12/16/23 1031)  Distance: 200 feet (12/16/23 1031)  Activity: Within Unit (12/16/23 1031)  Activity Comments: freezes with turns (12/15/23 1451)  Additional Factors: Verbal cues; Increased time to complete (12/16/23 1031)  Assistance Level: Stand by assist (12/16/23 1031)  Gait Deviations: Slow felix;Decreased step length bilateral;Decreased weight shift bilateral;Decreased heel strike right;Decreased heel strike left; Wide base of support (12/16/23 1031)  Skilled Clinical Factors: gtb wrap for increased proprioception, vc \"reach with heel\" improves step length and hs vs foot flat, fading carryover with fatigue and turns (12/16/23 1031)  Stairs:  Stairs/Curb  Stairs?: Yes (12/13/23 1258)  Stairs  # Steps : 4 (12/13/23 1258)  Stairs Height: 6\" (12/13/23 1258)  Rails: Bilateral (12/13/23 1258)  Assistance: Minimal assistance (12/13/23 1258)  Comment: Reciprocal pattern. Steadying assist provided for safety and cues for full foot on each step. (12/13/23 1258)  Stairs  Stair Height: 6'' (12/16/23 1031)  Device: Bilateral handrails (12/16/23 1031)  Number of Stairs: 4 (12/16/23 1031)  Additional Factors: Verbal cues; Reciprocal going up; Increased time to complete;Reciprocal going down (12/16/23 larynx and pharynx are unremarkable.  No acute abnormality of the salivary and thyroid glands. BONES: No acute osseous abnormality.   CTA HEAD: ANTERIOR CIRCULATION: Moderate calcification of the carotid arteries in the horizontal and vertical portions of the carotid canals as well as of the portions in the cavernous sinuses, with less than 50% stenosis. No significant stenosis of the rest of the intracranial internal carotid, anterior cerebral, or middle cerebral arteries. No aneurysm.  Patent anterior communicating artery. POSTERIOR CIRCULATION: No significant stenosis of the vertebral, basilar, or posterior cerebral arteries. No aneurysm.  The right vertebral artery is dominant and is slightly larger in caliber than the non dominant left vertebral artery.  The large left posterior communicating artery is associated with absence of the P1 segment of the left PCA, a common variant of normal.  I cannot definitely identify the right posterior communicating artery, a variant of normal. OTHER: No dural venous sinus thrombosis on this non-dedicated study. BRAIN: No mass effect or midline shift. No extra-axial fluid collection. The gray-white differentiation is maintained.     CTA of the neck shows noncalcified plaque with posterior wall ulceration of the origin of the right internal carotid artery without stenosis using NASCET criteria. Less than 50% stenosis of the proximal left internal carotid artery using NASCET criteria. 50% stenosis of the origin of the dominant right vertebral artery. The slightly smaller caliber non dominant left vertebral artery is widely patent. CTA of the head shows no clinically significant abnormality with no sign of large vessel occlusion.  Anatomic variation as described above.     MRI BRAIN  12/8/2023   1. There is central T2 hyperintensity within the ishan with peripheral restricted diffusion with symmetric T2 hyperintensity involving the deep gray nuclei bilaterally.  Constellation of

## 2023-12-16 NOTE — PLAN OF CARE
Problem: Safety - Adult  Goal: Free from fall injury  Outcome: Progressing     Problem: Discharge Planning  Goal: Discharge to home or other facility with appropriate resources  Outcome: Progressing  Flowsheets (Taken 12/16/2023 2622)  Discharge to home or other facility with appropriate resources: Arrange for needed discharge resources and transportation as appropriate     Problem: Pain  Goal: Verbalizes/displays adequate comfort level or baseline comfort level  Outcome: Progressing     Problem: Skin/Tissue Integrity  Goal: Absence of new skin breakdown  Description: 1. Monitor for areas of redness and/or skin breakdown  2. Assess vascular access sites hourly  3. Every 4-6 hours minimum:  Change oxygen saturation probe site  4. Every 4-6 hours:  If on nasal continuous positive airway pressure, respiratory therapy assess nares and determine need for appliance change or resting period.   Outcome: Progressing

## 2023-12-16 NOTE — PROGRESS NOTES
Patient up on her wheel chair visiting with family. Pain to her back medicated with tylenol. Pt. Does not care for the frozen supplement. Switch to the oral supplement.

## 2023-12-16 NOTE — PROGRESS NOTES
OCCUPATIONAL THERAPY  INPATIENT REHAB TREATMENT NOTE  [facility]      NAME: Breana Zamora  : 1970 (52 y.o.)  MRN: [id number]  CODE STATUS: Full Code  Room: O494/V113-10    Date of Service: 2023    Referring Physician: Dr. Diana Franklin Diagnosis: Impaired mobility and ADLs d/t osmotic demyelination syndrome    Hospital course:   Comments: 52 y.o. female presented with recurrent falls and inability to care for self. Recent history of electrolyte abnormalities. MRI of the brain showed results suggestive of osmotic demyelination syndrome otherwise no acute intracranial abnormality. 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      Restrictions  Restrictions/Precautions  Restrictions/Precautions: Fall Risk            Position Activity Restriction  Other position/activity restrictions: Pt can have thin liquids-sips. Pt cannot have mixed food/liquid such as cold cereal and milk, chicken noodle soup    Patient's date of birth confirmed: Yes    SAFETY:  Safety Devices  Safety Devices in place: Yes  Type of devices: All fall risk precautions in place    SUBJECTIVE:  Subjective: Pt stated that she was tired. Pain at start of treatment: Yes: 2/10    Pain at end of treatment: Yes: 3-4/10    Location: Left hip/back  Description achy  Nursing notified: No  RN: irais  Intervention: Other: pt stated she took pain medication prior to earlier therapy and nursing notification wasn't necessary. OBJECTIVE:  Pt completing bue strengthening activities with 1# weight, x 15 repetitions bicep curls IR/ER, punch outs, shoulder press. Occasional rest breaks between sets. Pt utilizing resistive theraputty for hand strengthening as well as fine motor coordination tasks and  bilateral and in hand manipulation tasks.  Pt manipulating putty removing beads and completing strengthening tasks             Education:  Education  Education Given To:

## 2023-12-16 NOTE — PROGRESS NOTES
OCCUPATIONAL THERAPY  INPATIENT REHAB TREATMENT NOTE  [facility]      NAME: Zoey Bedoya  : 1970 (52 y.o.)  MRN: [id number]  CODE STATUS: Full Code  Room: G198/C087-07    Date of Service: 2023    Referring Physician: Dr. Jacklyn Martins Diagnosis: Impaired mobility and ADLs d/t osmotic demyelination syndrome    Hospital course:   Comments: 52 y.o. female presented with recurrent falls and inability to care for self. Recent history of electrolyte abnormalities. MRI of the brain showed results suggestive of osmotic demyelination syndrome otherwise no acute intracranial abnormality. 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      Restrictions  Restrictions/Precautions  Restrictions/Precautions: Fall Risk        Patient's date of birth confirmed: Yes    SAFETY:  Safety Devices  Safety Devices in place: Yes  Type of devices: All fall risk precautions in place    SUBJECTIVE:  Subjective: Pt stated that she was tired. Pain at start of treatment: No    Pain at end of treatment: No      COGNITION:  Orientation  Overall Orientation Status: Within Functional Limits  Cognition  Overall Cognitive Status: WFL  Cognition Comment: slower processing        OBJECTIVE:    Pt engaged in seated activities to increase activity endurance for ADL participation. Pt tasked to fold and mate socks. Pt demonstrated ability to locate and mate socks with increased time. Pt demonstrated ability to fold socks with out difficulty. Pt tasked to manipulate clothespins and place them on a stick standing up on the table. Pt demonstrated ability to manipulate clothespins without difficulty. Pt demonstrated ability to reach to place clothespins on the top portion of the stick without difficulty. Pt demonstrated ability to remove clothespins from stick and place them into box without difficulty.     Pt tasked to place pegs onto a peg board to increase FM

## 2023-12-17 LAB
ANION GAP SERPL CALCULATED.3IONS-SCNC: 13 MEQ/L (ref 9–15)
BASOPHILS # BLD: 0.1 K/UL (ref 0–0.2)
BASOPHILS NFR BLD: 0.9 %
BUN SERPL-MCNC: 8 MG/DL (ref 6–20)
CALCIUM SERPL-MCNC: 9.6 MG/DL (ref 8.5–9.9)
CHLORIDE SERPL-SCNC: 100 MEQ/L (ref 95–107)
CO2 SERPL-SCNC: 27 MEQ/L (ref 20–31)
CREAT SERPL-MCNC: 0.57 MG/DL (ref 0.5–0.9)
EOSINOPHIL # BLD: 0.3 K/UL (ref 0–0.7)
EOSINOPHIL NFR BLD: 3.3 %
ERYTHROCYTE [DISTWIDTH] IN BLOOD BY AUTOMATED COUNT: 12.2 % (ref 11.5–14.5)
GLUCOSE SERPL-MCNC: 110 MG/DL (ref 70–99)
HCT VFR BLD AUTO: 38.3 % (ref 37–47)
HGB BLD-MCNC: 13.1 G/DL (ref 12–16)
LYMPHOCYTES # BLD: 2.3 K/UL (ref 1–4.8)
LYMPHOCYTES NFR BLD: 30 %
MCH RBC QN AUTO: 34.3 PG (ref 27–31.3)
MCHC RBC AUTO-ENTMCNC: 34.2 % (ref 33–37)
MCV RBC AUTO: 100.3 FL (ref 79.4–94.8)
MONOCYTES # BLD: 0.5 K/UL (ref 0.2–0.8)
MONOCYTES NFR BLD: 6.8 %
NEUTROPHILS # BLD: 4.4 K/UL (ref 1.4–6.5)
NEUTS SEG NFR BLD: 58.6 %
PLATELET # BLD AUTO: 396 K/UL (ref 130–400)
POTASSIUM SERPL-SCNC: 3.8 MEQ/L (ref 3.4–4.9)
RBC # BLD AUTO: 3.82 M/UL (ref 4.2–5.4)
SODIUM SERPL-SCNC: 140 MEQ/L (ref 135–144)
WBC # BLD AUTO: 7.5 K/UL (ref 4.8–10.8)

## 2023-12-17 PROCEDURE — 6370000000 HC RX 637 (ALT 250 FOR IP): Performed by: NURSE PRACTITIONER

## 2023-12-17 PROCEDURE — 6370000000 HC RX 637 (ALT 250 FOR IP): Performed by: FAMILY MEDICINE

## 2023-12-17 PROCEDURE — 6360000002 HC RX W HCPCS: Performed by: FAMILY MEDICINE

## 2023-12-17 PROCEDURE — 1180000000 HC REHAB R&B

## 2023-12-17 PROCEDURE — 6370000000 HC RX 637 (ALT 250 FOR IP): Performed by: PHYSICAL MEDICINE & REHABILITATION

## 2023-12-17 PROCEDURE — 85025 COMPLETE CBC W/AUTO DIFF WBC: CPT

## 2023-12-17 PROCEDURE — 36415 COLL VENOUS BLD VENIPUNCTURE: CPT

## 2023-12-17 PROCEDURE — 80048 BASIC METABOLIC PNL TOTAL CA: CPT

## 2023-12-17 RX ADMIN — CARBIDOPA AND LEVODOPA 1 TABLET: 25; 100 TABLET ORAL at 14:22

## 2023-12-17 RX ADMIN — LOSARTAN POTASSIUM 25 MG: 25 TABLET, FILM COATED ORAL at 09:35

## 2023-12-17 RX ADMIN — OXYBUTYNIN CHLORIDE 5 MG: 5 TABLET, EXTENDED RELEASE ORAL at 09:35

## 2023-12-17 RX ADMIN — ENOXAPARIN SODIUM 40 MG: 100 INJECTION SUBCUTANEOUS at 09:34

## 2023-12-17 RX ADMIN — SENNOSIDES 8.6 MG: 8.6 TABLET, FILM COATED ORAL at 20:48

## 2023-12-17 RX ADMIN — Medication 10 MG: at 20:48

## 2023-12-17 RX ADMIN — FAMOTIDINE 20 MG: 20 TABLET ORAL at 20:48

## 2023-12-17 RX ADMIN — Medication 100 MCG: at 09:35

## 2023-12-17 RX ADMIN — ATORVASTATIN CALCIUM 10 MG: 10 TABLET, FILM COATED ORAL at 20:48

## 2023-12-17 RX ADMIN — FAMOTIDINE 20 MG: 20 TABLET ORAL at 09:35

## 2023-12-17 RX ADMIN — ZOLPIDEM TARTRATE 5 MG: 5 TABLET ORAL at 23:00

## 2023-12-17 RX ADMIN — PANTOPRAZOLE SODIUM 40 MG: 40 TABLET, DELAYED RELEASE ORAL at 06:26

## 2023-12-17 RX ADMIN — CARBIDOPA AND LEVODOPA 1 TABLET: 25; 100 TABLET ORAL at 06:26

## 2023-12-17 NOTE — PROGRESS NOTES
communicating artery is associated with absence of the P1 segment of the left PCA, a common variant of normal.  I cannot definitely identify the right posterior communicating artery, a variant of normal. OTHER: No dural venous sinus thrombosis on this non-dedicated study. BRAIN: No mass effect or midline shift. No extra-axial fluid collection. The gray-white differentiation is maintained.     CTA of the neck shows noncalcified plaque with posterior wall ulceration of the origin of the right internal carotid artery without stenosis using NASCET criteria. Less than 50% stenosis of the proximal left internal carotid artery using NASCET criteria. 50% stenosis of the origin of the dominant right vertebral artery. The slightly smaller caliber non dominant left vertebral artery is widely patent. CTA of the head shows no clinically significant abnormality with no sign of large vessel occlusion.  Anatomic variation as described above.     MRI BRAIN  12/8/2023   1. There is central T2 hyperintensity within the ishan with peripheral restricted diffusion with symmetric T2 hyperintensity involving the deep gray nuclei bilaterally.  Constellation of findings are most suggestive of osmotic demyelination syndrome.   2. Otherwise, no acute intracranial abnormality.      CT Head   12/8/2023 : Central hypoattenuation is noted within the ishan on image 44 and image 13 of indeterminate age or etiology.  This would be an unexpected finding in a patient of this age.  No prior studies are available for comparison.  There is no evidence for hydrocephalus.  The basilar cisterns are patent.  The visualized paranasal sinuses and mastoid air cells are normally aerated. Atheromatous plaque affects the carotid siphons.  Mild bifrontal parenchymal volume loss is identified.     Age indeterminate abnormality within the ishan.  MRI of the brain is recommended for further evaluation.  No prior studies are available for comparison.  The findings were  high-dose vitamin D, recheck vitamin D level out after discharge    Osmotic demyelination syndrome,   Central pontine myelinolysis     Alcohol abuse-avoid alcohol use provide emotional support encouraged12-step program       Focus of today's plan-  Initiate and modify therapuetic plan to meet patients individual needs, add rest breaks as needed, and Focus on reassessing rehab goals and coordinating therapy and medical self care issues.     Serge Amado D.O., PM&R     Attending    [address] [place] [address]

## 2023-12-17 NOTE — PROGRESS NOTES
Pt assessment completed. Vital signs taken. Pt denies any pain at this time. Pt is A&O x4. Pt is slow to respond, flat affect, and diminished lung sounds. Pt has a pur wick attached and william clear urine. Pt now has a CPAP in the room.

## 2023-12-17 NOTE — PROGRESS NOTES
Pt. Would like a day pass for [place]. Pt. Started on Sinemet on 12/15/23. Pt. Has been continent through out the day. 3 large formed stools. Tan in color. Pt. Denies pain. Appetite good. Was up most of the day watching sports with mom on tv.

## 2023-12-17 NOTE — PROGRESS NOTES
Patient was referred for consultation due to anxiety, possible depression, and alcohol use disorder. The history of her current neurological problems are somewhat unclear to me. She told me that she had been \"sick\" for about two years, but her difficulties with mobility developed only recently. She acknowledged having \"2-3 drinks\" every night \"for years\", but she could be minimizing her alcohol use. She also reported having been treated with \"anxiety meds\", but she said that these meds made her sick. (The Chart Review indicated that she started taking Buspar in , but apparently discontinued this medication.)    The most recent (12/15) note by the nurse practitioner reported the following:  \"Severely bradykinetic...flat affect...gait ataxia...rigidity problems...confusion\". She was started on Sinemet on Friday.    When seen this morning, she was working on a crossword puzzle, seated in her wheelchair. She seemed anxious and depressed. She was tearful at one point. She said that she \"hated\" being in the hospital and said that the nurses were \"lying about me\". She acknowledged that it was hard for her to accept her disability and to depend on others.    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 She was also an athlete and was recruited to play basketball in college. She had to give up playing college basketball due to her nursing studies. She played for an [employer] women's basketball team in [place] when she was in her 40s. 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 She left the [employer] to return to [place] to help take care of her father who  several years ago.    A/P- I would like to speak with her family about their perceptions of Nancy's recent. It would be helpful to get more information about the history of her psychological problems. I did not find Nancy to be confused this morning, but  her insight seemed somewhat questionable.  She acknowledged that she was debilitated, and seemed motivated to participate in the Rehab program.

## 2023-12-18 PROBLEM — I95.9 HYPOTENSION: Status: ACTIVE | Noted: 2023-12-18

## 2023-12-18 LAB — STREP GRP A PCR: NEGATIVE

## 2023-12-18 PROCEDURE — 6360000002 HC RX W HCPCS: Performed by: PHYSICAL MEDICINE & REHABILITATION

## 2023-12-18 PROCEDURE — 87651 STREP A DNA AMP PROBE: CPT

## 2023-12-18 PROCEDURE — 99233 SBSQ HOSP IP/OBS HIGH 50: CPT | Performed by: PHYSICAL MEDICINE & REHABILITATION

## 2023-12-18 PROCEDURE — 2500000003 HC RX 250 WO HCPCS: Performed by: PHYSICAL MEDICINE & REHABILITATION

## 2023-12-18 PROCEDURE — 97535 SELF CARE MNGMENT TRAINING: CPT

## 2023-12-18 PROCEDURE — 6360000002 HC RX W HCPCS: Performed by: FAMILY MEDICINE

## 2023-12-18 PROCEDURE — 97112 NEUROMUSCULAR REEDUCATION: CPT

## 2023-12-18 PROCEDURE — 97530 THERAPEUTIC ACTIVITIES: CPT

## 2023-12-18 PROCEDURE — 6370000000 HC RX 637 (ALT 250 FOR IP): Performed by: NURSE PRACTITIONER

## 2023-12-18 PROCEDURE — 99232 SBSQ HOSP IP/OBS MODERATE 35: CPT | Performed by: NURSE PRACTITIONER

## 2023-12-18 PROCEDURE — 97116 GAIT TRAINING THERAPY: CPT

## 2023-12-18 PROCEDURE — 97110 THERAPEUTIC EXERCISES: CPT

## 2023-12-18 PROCEDURE — 6370000000 HC RX 637 (ALT 250 FOR IP): Performed by: PHYSICAL MEDICINE & REHABILITATION

## 2023-12-18 PROCEDURE — 6370000000 HC RX 637 (ALT 250 FOR IP): Performed by: FAMILY MEDICINE

## 2023-12-18 PROCEDURE — 92526 ORAL FUNCTION THERAPY: CPT

## 2023-12-18 PROCEDURE — 1180000000 HC REHAB R&B

## 2023-12-18 PROCEDURE — 92507 TX SP LANG VOICE COMM INDIV: CPT

## 2023-12-18 RX ORDER — AMMONIUM LACTATE 12 G/100G
LOTION TOPICAL 3 TIMES DAILY
Status: DISCONTINUED | OUTPATIENT
Start: 2023-12-18 | End: 2023-12-30 | Stop reason: HOSPADM

## 2023-12-18 RX ORDER — CYANOCOBALAMIN 1000 UG/ML
1000 INJECTION, SOLUTION INTRAMUSCULAR; SUBCUTANEOUS WEEKLY
Status: DISCONTINUED | OUTPATIENT
Start: 2023-12-18 | End: 2023-12-30 | Stop reason: HOSPADM

## 2023-12-18 RX ORDER — UBIDECARENONE 100 MG
100 CAPSULE ORAL DAILY
Status: DISCONTINUED | OUTPATIENT
Start: 2023-12-18 | End: 2023-12-30 | Stop reason: HOSPADM

## 2023-12-18 RX ORDER — LIDOCAINE 4 G/G
3 PATCH TOPICAL DAILY
Status: DISCONTINUED | OUTPATIENT
Start: 2023-12-18 | End: 2023-12-30 | Stop reason: HOSPADM

## 2023-12-18 RX ORDER — HYDROCORTISONE 25 MG/G
CREAM TOPICAL 2 TIMES DAILY
Status: DISCONTINUED | OUTPATIENT
Start: 2023-12-18 | End: 2023-12-30 | Stop reason: HOSPADM

## 2023-12-18 RX ORDER — VITAMIN B COMPLEX
2000 TABLET ORAL
Status: DISCONTINUED | OUTPATIENT
Start: 2023-12-18 | End: 2023-12-30 | Stop reason: HOSPADM

## 2023-12-18 RX ADMIN — Medication 2000 UNITS: at 16:50

## 2023-12-18 RX ADMIN — CARBIDOPA AND LEVODOPA 1 TABLET: 25; 100 TABLET ORAL at 06:14

## 2023-12-18 RX ADMIN — Medication 100 MCG: at 07:43

## 2023-12-18 RX ADMIN — ENOXAPARIN SODIUM 40 MG: 100 INJECTION SUBCUTANEOUS at 07:43

## 2023-12-18 RX ADMIN — Medication 10 MG: at 21:21

## 2023-12-18 RX ADMIN — Medication: at 12:55

## 2023-12-18 RX ADMIN — Medication: at 21:26

## 2023-12-18 RX ADMIN — OXYBUTYNIN CHLORIDE 5 MG: 5 TABLET, EXTENDED RELEASE ORAL at 07:43

## 2023-12-18 RX ADMIN — FAMOTIDINE 20 MG: 20 TABLET ORAL at 07:43

## 2023-12-18 RX ADMIN — PANTOPRAZOLE SODIUM 40 MG: 40 TABLET, DELAYED RELEASE ORAL at 06:14

## 2023-12-18 RX ADMIN — BENZOCAINE AND MENTHOL 1 LOZENGE: 15; 3.6 LOZENGE ORAL at 21:21

## 2023-12-18 RX ADMIN — ATORVASTATIN CALCIUM 10 MG: 10 TABLET, FILM COATED ORAL at 21:21

## 2023-12-18 RX ADMIN — CYANOCOBALAMIN 1000 MCG: 1000 INJECTION, SOLUTION INTRAMUSCULAR; SUBCUTANEOUS at 12:55

## 2023-12-18 RX ADMIN — BENZOCAINE AND MENTHOL 1 LOZENGE: 15; 3.6 LOZENGE ORAL at 11:06

## 2023-12-18 RX ADMIN — Medication 100 MG: at 12:55

## 2023-12-18 RX ADMIN — FAMOTIDINE 20 MG: 20 TABLET ORAL at 21:21

## 2023-12-18 RX ADMIN — SENNOSIDES 8.6 MG: 8.6 TABLET, FILM COATED ORAL at 21:23

## 2023-12-18 RX ADMIN — CARBIDOPA AND LEVODOPA 1 TABLET: 25; 100 TABLET ORAL at 13:59

## 2023-12-18 RX ADMIN — ZOLPIDEM TARTRATE 5 MG: 5 TABLET ORAL at 22:30

## 2023-12-18 RX ADMIN — LOSARTAN POTASSIUM 25 MG: 25 TABLET, FILM COATED ORAL at 07:43

## 2023-12-18 NOTE — PROGRESS NOTES
Pt assessment completed. Vital signs taken. Pt denies any pain at this time. Pt is A&O x4. Pt is slow to respond, flat affect, and diminished lung sounds. Pt chose not to have a pur wick attached and used call light appropriately. Pt now has a CPAP in the room.

## 2023-12-18 NOTE — PROGRESS NOTES
Physical Therapy Rehab Treatment Note  [facility]  [facility]       NAME: Chrys Opitz  : 1970 (87 y.o.)  MRN: [id number]  CODE STATUS: Full Code    Date of Service: 2023       Restrictions:  Restrictions/Precautions: Fall Risk  Position Activity Restriction  Other position/activity restrictions: Pt can have thin liquids-sips. Pt cannot have mixed food/liquid such as cold cereal and milk, chicken noodle soup       SUBJECTIVE:   Subjective: \"She just put my slippers on\"    Pain  Pain: denies pain at start and end of session      OBJECTIVE:         Bed Mobility  Additional Factors: Verbal cues; Increased time to complete; Head of bed flat; Without handrails (on therapy mat this session)  Roll Left  Assistance Level: Independent  Skilled Clinical Factors: improved ability to complete without use of handrail  Roll Right  Assistance Level: Independent  Skilled Clinical Factors: improved ability to complete without use of handrail  Sit to Supine  Assistance Level: Supervision  Skilled Clinical Factors: increased time and effort, though improving  Supine to Sit  Assistance Level: Supervision  Skilled Clinical Factors: increased time and effort, vc to improve technique with good carry over  Scooting  Assistance Level: Supervision  Skilled Clinical Factors: scooting to [facility], vc's for technique with good carry over. increased effort required. Transfers  Surface: To mat;From mat; Wheelchair  Additional Factors: Verbal cues; Increased time to complete;Hand placement cues  Device:  (no device from chair to mat)  Sit to Stand  Assistance Level: Stand by assist  Skilled Clinical Factors: good technique and efficiency noted, no vc's provided  Stand to Sit  Assistance Level: Stand by assist  Skilled Clinical Factors: improving carry over with safe approach to wc as well as controlling descent with BUEs  Bed To/From Chair  Technique: Stand pivot  Assistance Level: Stand by assist  Skilled

## 2023-12-18 NOTE — PROGRESS NOTES
Clinical Factors: continued close SBA for safety, improved sequencing (12/16/23 1031)  W/C mobility:       Occupational Therapy:      ADL  Feeding: Setup (12/13/23 0949)  Grooming: Stand by assistance;Increased time to complete (12/13/23 0949)  UE Bathing: Stand by assistance;Increased time to complete (12/13/23 0949)  LE Bathing: Moderate assistance;Increased time to complete (12/13/23 0949)  LE Bathing Skilled Clinical Factors: A for distal BLE and buttocks (12/13/23 0949)  UE Dressing: Minimal assistance;Increased time to complete (12/13/23 0949)  UE Dressing Skilled Clinical Factors: min A adjusting bra (12/13/23 0949)  LE Dressing: Maximum assistance;Increased time to complete (12/13/23 0949)  LE Dressing Skilled Clinical Factors: max A for threading pants and brief, assist for thoroughness of garments around waist (12/13/23 0949)  Toileting: Unable to assess(comment) (12/13/23 0949)  Toileting Skilled Clinical Factors: denies need (12/13/23 0949)  Additional Comments: pt requires increased time for most tasks, fatigues easily (12/13/23 0949)  Toilet Transfers  Toilet Transfer: Unable to assess (12/13/23 0951)  Tub Transfers  Tub Transfers: Not tested (12/13/23 0951)  Shower Transfers  Shower - Transfer To: Shower seat with back (12/13/23 0951)  Shower - Technique: Stand pivot (12/13/23 0951)  Shower Transfers: Minimal assistance (12/13/23 0951)  Shower Transfers Comments: cues for hand placement (12/13/23 0951)    Speech Therapy:      Comprehension: Within Functional Limits  Verbal Expression: Within functional limits  Diet/Swallow:        Dysphagia Outcome Severity Scale: Level 4: Mild moderate dysphagia- Intermittent supervision/cueing. One - two diet consistencies restricted  Compensatory Swallowing Strategies : Alternate solids and liquids, Small bites/sips, No straws, Eat/Feed slowly  Therapeutic Interventions: Bolus control exercises, Diet tolerance monitoring, Patient/Family education, Oral motor  avoid alcohol monitor sodium level    Recurrent falls,   Ataxic gait-focus on balance and therapy    Current smoker-NicoDerm patch    Chronic pain-topicals and lowest effective dose of pain medication    Overactive bladder-check postvoid residuals    Vitamin D deficiency-Add high-dose vitamin D, recheck vitamin D level out after discharge    Osmotic demyelination syndrome,   Central pontine myelinolysis     Alcohol abuse-avoid alcohol use provide emotional support encouraged12-step program  Sore throat-check for strep treat symptomatically. Focus on endurance, activity pacing, reassessing rehab goals and discharge planning. Will consider therapeutic leave of absence on [facility] if patient's family is able to be trained.         Electronically signed by Lenny Pulido DO on 12/13/23 at 2:05 PM CRAIG Carmona D.O., PM&R     Attending    [address] [place] [address]

## 2023-12-18 NOTE — PROGRESS NOTES
Patient c/o sore throat, Cepacol lozenge ordered and given. Rapid strep sent.  Electronically signed by Angel Lovelace RN on 12/18/23 at 1:17 PM EST

## 2023-12-18 NOTE — PROGRESS NOTES
OCCUPATIONAL THERAPY  INPATIENT REHAB TREATMENT NOTE  [facility]      NAME: Joshua Kay  : 1970 (52 y.o.)  MRN: [id number]  CODE STATUS: Full Code  Room: P774/X672-55    Date of Service: 2023    Referring Physician: Dr. Kaylie Garrison  Rehab Diagnosis: Impaired mobility and ADLs d/t osmotic demyelination syndrome    Hospital course:   Comments: 52 y.o. female presented with recurrent falls and inability to care for self. Recent history of electrolyte abnormalities. MRI of the brain showed results suggestive of osmotic demyelination syndrome otherwise no acute intracranial abnormality. 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      Restrictions  Restrictions/Precautions  Restrictions/Precautions: Fall Risk                 Patient's date of birth confirmed: Yes    SAFETY:  Safety Devices  Safety Devices in place: Yes  Type of devices: All fall risk precautions in place    SUBJECTIVE:       Pain at start of treatment: No    Pain at end of treatment: No      COGNITION:  Orientation  Overall Orientation Status: Within Normal Limits  Orientation Level: Oriented X4  Cognition  Overall Cognitive Status: WFL      Pt's current cognitive status is:  Comprehension: Independent  Expression: Independent  Social Interaction: Independent  Problem Solving:  Mod I  Memory: Independent    OBJECTIVE:         Feeding  Assistance Level: Set-up  Grooming/Oral Hygiene  Assistance Level: Supervision  Skilled Clinical Factors: pt completed all tasks seated for brushing teeth  Upper Extremity Bathing  Assistance Level: Modified independent  Lower Extremity Bathing  Assistance Level: Supervision  Upper Extremity Dressing  Assistance Level: Minimal assistance  Skilled Clinical Factors: needed assist to start doffing bra over R arm, then pt completely finished doffing bra, and don to pull down posteriorly in the back; pt mod (I) to don/doff shirt  Lower Extremity

## 2023-12-18 NOTE — PROGRESS NOTES
[facility]  Facility/Department: [facility]  Speech Language Pathology   Treatment Note          Lucina Anaya  1970  [id number]  [x]   confirmed    Date: 2023      Restrictions/Precautions: Fall Risk  Position Activity Restriction  Other position/activity restrictions: Pt can have thin liquids-sips. Pt cannot have mixed food/liquid such as cold cereal and milk, chicken noodle soup    ADULT DIET; Regular  ADULT ORAL NUTRITION SUPPLEMENT; Dinner, Lunch; Standard High Calorie/High Protein Oral Supplement    Respiratory Status: O2 Flow Rate (L/min): 0 L/min (23 2355)   No active isolations    Rehab Diagnosis: Impaired mobility and activities of daily living dt osmotic demyelination syndrome     Subjective:  Alert, Cooperative, and Pleasant        Interventions used this date:  Speech Production, Dysphagia Treatment, and Voice Treatment    Objective/Assessment:  Patient progressing towards goals:  Short Term Goals  Time Frame for Short Term Goals: 1-2 weeks  Goal 1: Pt will complete OM drills paired with speech sounds to improve speech intelligibility x10/each and expression of their complex thoughts, needs, feelings, and ideas. Patient completed OM speech drills with intermittent reminders to take a breath  Pa-pa-pa: X10  Ta-ta-ta: x10  Ma-melvi-la: X10  Pa-ta-ka: X10    Goal 5: Pt will use breath and speech together appropriately during structured activities in 80% of opportunities with min verbal cues. With use of good breath, patient sustained \"3\" for 4-5 seconds. With use good breath, Patient counted 1-5 x1, 1-8 x1, 1-10 X3 with good breath support 2/3. With use of good breath, Patient stated X3 ELIZ with good vocal intensity. With use of good breath, Patient stated X3 the SHELBY with good intensity 3/3.     Improvement noted with vocal intensity, reduced vocal strain and improved breath support      Short-term Goals  Timeframe for Short-term Goals: 1-2 weeks  Goal 1: Patient will

## 2023-12-18 NOTE — PROGRESS NOTES
OCCUPATIONAL THERAPY  INPATIENT REHAB TREATMENT NOTE  [facility]      NAME: Allan Barrios  : 1970 (52 y.o.)  MRN: [id number]  CODE STATUS: Full Code  Room: H297/K740-33    Date of Service: 2023    Referring Physician: Dr. Jonas Ignacio  Rehab Diagnosis: Impaired mobility and ADLs d/t osmotic demyelination syndrome    Hospital course:   Comments: 52 y.o. female presented with recurrent falls and inability to care for self. Recent history of electrolyte abnormalities. MRI of the brain showed results suggestive of osmotic demyelination syndrome otherwise no acute intracranial abnormality. 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      Restrictions  Restrictions/Precautions  Restrictions/Precautions: Fall Risk                 Patient's date of birth confirmed: Yes    SAFETY:  Safety Devices  Safety Devices in place: Yes  Type of devices:  All fall risk precautions in place    SUBJECTIVE:       Pain at start of treatment: No    Pain at end of treatment: No      COGNITION:  Orientation  Overall Orientation Status: Within Normal Limits  Orientation Level: Oriented X4  Cognition  Overall Cognitive Status: WFL      OBJECTIVE:          Hand Hygiene  Assistance Level: Stand by assist  Skilled Clinical Factors: Pt completed hand washing needing vc's for safety to move towards sink d/t over reaching to wash hands  Toileting  Assistance Level: Supervision  Toilet Transfers  Technique: Stand step  Equipment: Standard toilet;Grab bars  Additional Factors: Verbal cues  Assistance Level: Contact guard assist  Skilled Clinical Factors: touching assist, training with 2 w/w for proper walker to body spacing and using back legs of walker as a landmark of where pt legs should be in alignment with  Tub/Shower Transfers         Functional Mobility  Device: Rolling walker  Activity: To/From bathroom  Assistance Level: Stand by assist  Skilled Clinical Factors: increase strength  Long Term Goal 3: increase ADL status        Therapy Time:   Individual Group Co-Treat   Time In 1500       Time Out 1535         Minutes 35                   ADL/IADL trainin minutes  Therapeutic activities: 15 minutes   Patient participated in above treatment session to complete previously scheduled minutes from 23.      Electronically signed by:    MICHELA Martin,   2023, 4:51 PM

## 2023-12-18 NOTE — PROGRESS NOTES
Physical Therapy Rehab Treatment Note  [facility]  [facility]       NAME: Alfreda Amado  : 1970 (22 y.o.)  MRN: [id number]  CODE STATUS: Full Code    Date of Service: 2023       Restrictions:  Restrictions/Precautions: Fall Risk  Position Activity Restriction  Other position/activity restrictions: Pt can have thin liquids-sips. Pt cannot have mixed food/liquid such as cold cereal and milk, chicken noodle soup       SUBJECTIVE:   Subjective: \"I got a new wheelchair this weekend\"    Pain  Pain: denies pain at start and end of session      OBJECTIVE:            Transfers  Surface: Wheelchair  Additional Factors: Verbal cues; Increased time to complete;Hand placement cues  Device: Walker  Sit to Stand  Assistance Level: Stand by assist  Skilled Clinical Factors: good technique and efficiency noted, no vc's provided  Stand to Sit  Assistance Level: Stand by assist  Skilled Clinical Factors: improving carry over with safe approach to wc as well as controlling descent with BUEs  Car Transfer  Assistance Level: Supervision  Skilled Clinical Factors: good carry over with technique and approach to car, improving efficiency    Ambulation  Surface: Level surface; Uneven surface; Carpet; Ramp  Device: Rolling walker  Distance: 180', 145', 200'  Activity: Within Unit; Other (Comment) (hallways)  Activity Comments: improved step length and height noted following gait drills  Additional Factors: Verbal cues; Increased time to complete  Assistance Level: Stand by assist  Gait Deviations: Slow felix;Decreased step length bilateral;Decreased weight shift bilateral;Decreased heel strike right;Decreased heel strike left; Wide base of support  Skilled Clinical Factors: vc's for improving step length and height, carry over improves as session progresses but fades during turns/different surfaces    Stairs  Stair Height: 6''  Device: Bilateral handrails  Number of Stairs: 4  Additional Factors: Verbal

## 2023-12-18 NOTE — PLAN OF CARE

## 2023-12-19 PROCEDURE — 6360000002 HC RX W HCPCS: Performed by: FAMILY MEDICINE

## 2023-12-19 PROCEDURE — 6370000000 HC RX 637 (ALT 250 FOR IP): Performed by: PHYSICAL MEDICINE & REHABILITATION

## 2023-12-19 PROCEDURE — 92526 ORAL FUNCTION THERAPY: CPT

## 2023-12-19 PROCEDURE — 97116 GAIT TRAINING THERAPY: CPT

## 2023-12-19 PROCEDURE — 6370000000 HC RX 637 (ALT 250 FOR IP): Performed by: NURSE PRACTITIONER

## 2023-12-19 PROCEDURE — 97535 SELF CARE MNGMENT TRAINING: CPT

## 2023-12-19 PROCEDURE — 1180000000 HC REHAB R&B

## 2023-12-19 PROCEDURE — 99232 SBSQ HOSP IP/OBS MODERATE 35: CPT | Performed by: PHYSICAL MEDICINE & REHABILITATION

## 2023-12-19 PROCEDURE — 97112 NEUROMUSCULAR REEDUCATION: CPT

## 2023-12-19 PROCEDURE — 92507 TX SP LANG VOICE COMM INDIV: CPT

## 2023-12-19 PROCEDURE — 97530 THERAPEUTIC ACTIVITIES: CPT

## 2023-12-19 PROCEDURE — 6370000000 HC RX 637 (ALT 250 FOR IP): Performed by: FAMILY MEDICINE

## 2023-12-19 RX ADMIN — Medication 100 MG: at 09:26

## 2023-12-19 RX ADMIN — Medication 100 MCG: at 09:23

## 2023-12-19 RX ADMIN — BENZOCAINE AND MENTHOL 1 LOZENGE: 15; 3.6 LOZENGE ORAL at 21:00

## 2023-12-19 RX ADMIN — BENZOCAINE AND MENTHOL 1 LOZENGE: 15; 3.6 LOZENGE ORAL at 16:25

## 2023-12-19 RX ADMIN — FAMOTIDINE 20 MG: 20 TABLET ORAL at 21:00

## 2023-12-19 RX ADMIN — CARBIDOPA AND LEVODOPA 1 TABLET: 25; 100 TABLET ORAL at 14:59

## 2023-12-19 RX ADMIN — Medication 10 MG: at 21:00

## 2023-12-19 RX ADMIN — PANTOPRAZOLE SODIUM 40 MG: 40 TABLET, DELAYED RELEASE ORAL at 05:58

## 2023-12-19 RX ADMIN — Medication 2000 UNITS: at 16:22

## 2023-12-19 RX ADMIN — OXYBUTYNIN CHLORIDE 5 MG: 5 TABLET, EXTENDED RELEASE ORAL at 09:22

## 2023-12-19 RX ADMIN — BENZOCAINE AND MENTHOL 1 LOZENGE: 15; 3.6 LOZENGE ORAL at 06:04

## 2023-12-19 RX ADMIN — ENOXAPARIN SODIUM 40 MG: 100 INJECTION SUBCUTANEOUS at 09:22

## 2023-12-19 RX ADMIN — ATORVASTATIN CALCIUM 10 MG: 10 TABLET, FILM COATED ORAL at 21:00

## 2023-12-19 RX ADMIN — HYDROCORTISONE: 25 CREAM TOPICAL at 21:02

## 2023-12-19 RX ADMIN — CARBIDOPA AND LEVODOPA 1 TABLET: 25; 100 TABLET ORAL at 05:58

## 2023-12-19 RX ADMIN — FAMOTIDINE 20 MG: 20 TABLET ORAL at 09:22

## 2023-12-19 RX ADMIN — Medication: at 09:25

## 2023-12-19 RX ADMIN — HYDROCORTISONE: 25 CREAM TOPICAL at 09:26

## 2023-12-19 RX ADMIN — Medication: at 14:43

## 2023-12-19 RX ADMIN — SENNOSIDES 8.6 MG: 8.6 TABLET, FILM COATED ORAL at 21:00

## 2023-12-19 RX ADMIN — LOSARTAN POTASSIUM 25 MG: 25 TABLET, FILM COATED ORAL at 09:23

## 2023-12-19 RX ADMIN — ZOLPIDEM TARTRATE 5 MG: 5 TABLET ORAL at 22:45

## 2023-12-19 ASSESSMENT — PAIN DESCRIPTION - LOCATION
LOCATION: THROAT

## 2023-12-19 ASSESSMENT — PAIN DESCRIPTION - DESCRIPTORS
DESCRIPTORS: ACHING
DESCRIPTORS: ACHING

## 2023-12-19 ASSESSMENT — PAIN SCALES - GENERAL
PAINLEVEL_OUTOF10: 6
PAINLEVEL_OUTOF10: 3

## 2023-12-19 NOTE — PROGRESS NOTES
Occupational Therapy Get up and Go Note            Date: 2023  Patient Name: Nery Chong        MRN: [id number]    Account #: [de-identified]  : 1970  (52 y.o.)      Subjective:  Patient states:  I want to go back to bed   Pain:  Pain at start of treatment: No    Pain at end of treatment: No    Objective:    Pt presented sitting in chair- finishing breakfast    ADL:  Feeding:  Modified independent   Grooming:  Washing hands while standing- Modified independent     Toileting:  Modified independent- urinated only   Toilet transfers:  Modified independent- fair safety    STS from bedside chair: SBA    Returning to bed: SUP    Treatment consisted of:   [x] ADL Training  [] Strengthening   [x] Transfer Training    [] DME Education  [] HEP   [] Patient Education  [] Other:    Safety:  Safety Devices  Safety Devices in place:   Type of devices:  All fall risk precautions in place      Therapy Time:   Individual Group Co-Treat   Time In 717       Time Out 0725         Minutes 8           ADL/IADL trainin minutes     Electronically signed by:    Zuri Mayfield OT    2023, 8:08 AM

## 2023-12-19 NOTE — PROGRESS NOTES
[facility]  Facility/Department: [facility]  Speech Language Pathology   Treatment Note          Saul Arceo  1970  [id number]  [x]   confirmed    Date: 2023      Restrictions/Precautions: Fall Risk  Position Activity Restriction  Other position/activity restrictions: Pt can have thin liquids-sips. Pt cannot have mixed food/liquid such as cold cereal and milk, chicken noodle soup    ADULT DIET; Regular  ADULT ORAL NUTRITION SUPPLEMENT; Dinner, Lunch; Standard High Calorie/High Protein Oral Supplement    Respiratory Status: O2 Flow Rate (L/min): 0 L/min (23 2355)   No active isolations    Rehab Diagnosis: Impaired mobility and activities of daily living dt osmotic demyelination syndrome     Subjective:  Alert, Cooperative, and Pleasant        Interventions used this date:  Speech Production, Dysphagia Treatment, and Voice Treatment    Objective/Assessment:  Patient progressing towards goals:  Short Term Goals  Time Frame for Short Term Goals: 1-2 weeks  Goal 1: Pt will complete OM drills paired with speech sounds to improve speech intelligibility x10/each and expression of their complex thoughts, needs, feelings, and ideas. Patient completed the following OM drills with good breath support  Pa-pa-pa: X10  Ta-ta-ta: X10  Ma-melvi-la: X8/10 with good breath support  Pa-ta-ka: X10    Goal 2: Pt will be educated on compensatory strategies to promote speech intelligibility in 5/5 given opportunities so the patient has a better understanding of strategies that will enhance his/her ability to express their wants and needs.  Patient repeated multi syllabic words with use of over articulation, good breath support and slow rate I with the following accuracy  3 syllable words: good breath support and intensity 90% I  4 syllable words: good breath support and intensity 100% I    Goal 3: The patient will read phrases/sentences out loud using appropriate voicing in 90% of opportunities given min verbal cues to utilize voice strategies. Patient repeated 5 syllable sentences with reduced vocal strain, good breath support and vocal intensity I with 80% accuracy.    Short-term Goals  Timeframe for Short-term Goals: 1-2 weeks.  Goal 3: Pt will complete labial/buccal/lingual ROM/strengthening/coordination exercises 10x/each with min cues, to promote safety and efficiency of oral phase of swallow. Patient completed the following lingual strengthening exercises with improved strength and ROM  Lingual [place]: X10  Lingual elevation: X10  Lingual lateralization: X10  Lingual pull back: X10    Treatment/Activity Tolerance:  Patient tolerated treatment well    Plan:  Continue per POC    Pain Assessment:  Patient does not c/o pain.    Pain Re-assessment:  Patient does not c/o pain.    Patient/Caregiver Education:  Patient educated on session and progression towards goals.    Safety Devices:  Chair alarm in place      Dysphagia Outcome Severity Scale    SWALLOWING  Ratin      Speech Therapy Level of Assistance Scale    MOTOR SPEECH  Rating: Supervised Assistance-Minimal Assistance    Therapy Time  SLP Individual Minutes  Time In: 1100  Time Out: 1130  Minutes: 30      10 swallow  20 speech/voice      Signature: Electronically signed by LENNY Corral on 2023 at 11:07 AM

## 2023-12-19 NOTE — PROGRESS NOTES
Physical Therapy Rehab Treatment Note  [facility]       NAME: Nancy Jean  : 1970 (53 y.o.)  MRN: [id number]  CODE STATUS: Full Code    Date of Service: 2023       Restrictions:  Restrictions/Precautions: Fall Risk  Position Activity Restriction  Other position/activity restrictions: Pt can have thin liquids-sips. Pt cannot have mixed food/liquid such as cold cereal and milk, chicken noodle soup       SUBJECTIVE:   Subjective: \"I'm pretty sure I'll be getting a pass on Danni Wells\"    Pain  Pain: denies pain at start and end of session      OBJECTIVE:            Transfers  Surface: Wheelchair;To chair with arms;From chair with arms;Standard toilet  Additional Factors: Verbal cues;Increased time to complete;Hand placement cues  Device: Walker  Sit to Stand  Assistance Level: Supervision  Skilled Clinical Factors: occasional vc for hand placement  Stand to Sit  Assistance Level: Supervision  Skilled Clinical Factors: improving carry over with safe approach to wc as well as controlling descent with BUEs    Ambulation  Surface: Level surface;Uneven surface;Ramp;Carpet  Device: Rolling walker  Distance: 175', 200', 4 x10'  Activity: Within Unit;Other (Comment);Within Room (hallways)  Activity Comments: struggles with turns, obstacles, and tight spaces - small shuffling steps noted  Additional Factors: Verbal cues;Increased time to complete  Assistance Level: Stand by assist  Gait Deviations: Slow felix;Decreased step length bilateral;Decreased weight shift bilateral;Decreased heel strike right;Decreased heel strike left;Wide base of support  Skilled Clinical Factors: vc's for improving step length and height, carry over improves as session progresses but fades during turns/different surfaces    Stairs  Stair Height: 8''  Device: Bilateral handrails  Number of Stairs: 4  Additional Factors: Verbal cues;Reciprocal going up;Increased time to complete;Reciprocal going  down  Assistance Level: Stand by assist  Skilled Clinical Factors: fluidity with higher step height decreased slightly, no instability noted        PT Exercises  Functional Mobility Circuit Training: multiple STS and approaching wc/chair with arms - focusing on safe approach and turning  Motor Control/Coordination: gait drills in // bars: step over canes and blue foam, fwd stepping, , march x4 laps each; weave in/out of bolsters x4 laps - focusing on safe and efficienct turns with ww      Activity Tolerance  Activity Tolerance: Patient tolerated treatment wellTimed Up and Go: 34       ASSESSMENT/PROGRESS TOWARDS GOALS:   Assessment  Assessment: Pt making great progress toward all goals, improved TUG score by 10s this date scoring 34s. Continued to focus on overall motor control, balance, and coordination with gait drills, pt especially challenged with ambulating around obstacles. Activity Tolerance: Patient tolerated treatment well    Goals:  Long Term Goals  Long Term Goal 1: Pt to complete bed mobility with indep  Long Term Goal 2: Pt to complete transfers with indep  Long Term Goal 3: Pt to ambulate 150ft with LRD and indep  Long Term Goal 4: Pt to manage 3 steps with HR and indep  Long Term Goal 5: Pt to complete HEP with indep  Additional Goals?: Yes  Long term goal 6: Pt to complete TUG within 20sec  Patient Goals   Patient Goals : I want to be able to go home and be independent. PLAN OF CARE/Safety:   Safety Devices  Type of Devices: All fall risk precautions in place; Chair alarm in place; Left in chair;Call light within reach      Therapy Time:   Individual   Time In 930   Time Out 1030   Minutes 60      Minutes:60  Transfer/Bed mobility training: 15  Gait trainin  Neuro re education: 15  Therapeutic ex: 0      Jamie Dwyer PTA, 23 at 2:49 PM

## 2023-12-19 NOTE — PROGRESS NOTES
Physical Therapy Rehab Treatment Note  [facility]  [facility]       NAME: Antonina Nunn  : 1970 (67 y.o.)  MRN: [id number]  CODE STATUS: Full Code    Date of Service: 2023       Restrictions:  Restrictions/Precautions: Fall Risk  Position Activity Restriction  Other position/activity restrictions: Pt can have thin liquids-sips. Pt cannot have mixed food/liquid such as cold cereal and milk, chicken noodle soup       SUBJECTIVE:   Subjective: \"I didn't get a nap\"    Pain  Pain: denies pain at start and end of session      OBJECTIVE:            Transfers  Surface: Wheelchair; To chair with arms;From chair with arms  Additional Factors: Verbal cues; Increased time to complete;Hand placement cues  Device: Walker  Sit to Stand  Assistance Level: Supervision  Skilled Clinical Factors: occasional vc for hand placement  Stand to Sit  Assistance Level: Supervision  Skilled Clinical Factors: improving carry over with safe approach to wc as well as controlling descent with BUEs    Ambulation  Surface: Level surface; Uneven surface;Ramp;Carpet  Device: Rolling walker  Distance: 200', 150', 35', 80'  Activity: Within Unit; Other (Comment) (hallways)  Activity Comments: struggles with turns, obstacles, and tight spaces - small shuffling steps noted  Additional Factors: Verbal cues; Increased time to complete  Assistance Level: Stand by assist  Gait Deviations: Slow felix;Decreased step length bilateral;Decreased weight shift bilateral;Decreased heel strike right;Decreased heel strike left; Wide base of support  Skilled Clinical Factors: vc's for improving step length and height, carry over improves as session progresses but fades during turns/different surfaces    Stairs  Stair Height: 6''  Device: Bilateral handrails  Number of Stairs: 8  Additional Factors: Verbal cues; Reciprocal going up; Increased time to complete;Reciprocal going down  Assistance Level: Stand by assist  Skilled Clinical

## 2023-12-19 NOTE — CARE COORDINATION
CM spoke with patient and her mother. Questions about equipment and explained they would be ordered according to therapy's recommendations. Also wanted to know if she could go on a pass for Danni flanagan(10-6). Explained that training needed to be done prior to that day. Scheduled family training for Thursday (12/21) at [facility]. Therapy was made aware.  Electronically signed by Raoul Waters RN on 12/19/2023 at 4:42 PM

## 2023-12-19 NOTE — PROGRESS NOTES
OCCUPATIONAL THERAPY  INPATIENT REHAB TREATMENT NOTE  [facility]      NAME: Antonina Nunn  : 1970 (52 y.o.)  MRN: [id number]  CODE STATUS: Full Code  Room: W626/Q165-17    Date of Service: 2023    Referring Physician: Dr. 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 female presented with recurrent falls and inability to care for self. Recent history of electrolyte abnormalities. MRI of the brain showed results suggestive of osmotic demyelination syndrome otherwise no acute intracranial abnormality. 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 Provided pt instruction to task with demonstration. Pt was able to manipulate putty between BUE hands by using finger tips, and full grasp with in hand manipulation to pull/push beads out until all beads were out of the putty. Pt rolled putty into a \"log\" then instructed pt to complete oppositional squeezing until hand met in the middle. Pt demo decreased strength needing increased time to squeeze as much as possible. Pt complete balling up putty and placing back into container, placing lid on. Pt completed this task to improve fine motor skills in order to improve functional self care skills and decrease need for assistance. Provided pt with clothes pin/ruler activity standing with SBA and prn tactile cues for improving balance d/t pt swaying at times when completing task unsupported.  Provided pt instruction to place clothes pins on ruler switching back and forth between BUE.  Pt stood x 7:00 min.  Pt required rest break post standing.  Pt stood unsupported demo'ing F- (pt was swaying some but not LOB) to doff clothes pins.  Pt completed task to improve with functional standing tolerance, act tolerance, balance/coordination to increase safety and Ind with mobility and ADL tasks.     Pt completed small pipe tree activity with Sup.  Pt completed task seated with diagram placed midline.  Pt required min A to complete task d/t demonstrating min difficulty with the ability to push the pieces into each other enough so that they don't fall apart.  Pt was able to correctly place and identify how to place all pieces together.  Provided task to improve strength, fine motor skills and act tolerance to increase independence with functional ADL tasks.       Pt completed toileting tasks using walker to complete mobility with CGA into the bathroom/out of the bathroom.  Toileting: Supervision  Pt completed pant management: Sup  Toileting hygiene: mod (I) seated  Pt completed toilet transfer with 2 w/w/grab bars: SBA.  Pt used wet wipe to clean hands post activity, offered to have pt wash hands, but d/t severe dryness of hands, pt declined and stated she has washed them several times earlier and wanted to be careful washing them too much.    Education:   Pt educated to make sure to call for assist before standing.  Pt attempted to back up to toilet to sit to wait and demo decrease balance requiring Min A for recovery, but stated she was \"okay.\"      ASSESSMENT:  Assessment: pt tolerates about 7 min standing, with some instability/swaying noted, cooperative  Activity Tolerance: Patient tolerated treatment well      PLAN OF CARE:  Strengthening, Balance

## 2023-12-19 NOTE — FLOWSHEET NOTE
Patient alert, oriented and cooperative. Complains of sore throat pain, lozenges effective. Denies any further needs or complaints at this time. Call light within reach.

## 2023-12-19 NOTE — PROGRESS NOTES
Pt assessment completed. Vital signs taken. Pt denies any pain at this time. Pt is A&O x4. Pt is slow to respond, flat affect, and diminished lung sounds. Pt chose not to have a pur wick attached and used call light appropriately. Pt using cpap machine after being given their Ambien at 2230.

## 2023-12-19 NOTE — PROGRESS NOTES
OCCUPATIONAL THERAPY  INPATIENT REHAB TREATMENT NOTE  [facility]      NAME: Alfreda Amado  : 1970 (52 y.o.)  MRN: [id number]  CODE STATUS: Full Code  Room: L974/J070-40    Date of Service: 2023    Referring Physician: Dr. 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 female presented with recurrent falls and inability to care for self. Recent history of electrolyte abnormalities. MRI of the brain showed results suggestive of osmotic demyelination syndrome otherwise no acute intracranial abnormality. 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 Provided Sup to task. Pt completed task with good reaching and fair fine motor for puzzle piece retrieval and placement using both hands switching back forth. .   Pt reached with minimal dynamic reaching to the front and bilateral sides to complete task. Pt completed task to increase strength, functional act tolerance and improve fine motor skills to continue improving functional ADL tasks. Pt completed BUE bike on Min resistance  Pt utilized UE ergometer x 10 min without recovery periods  Pt stated she was tired and would like to take a break.     Therapist attempted to provide a break, but pt pushed through and continued per her initiation

## 2023-12-20 LAB
ANION GAP SERPL CALCULATED.3IONS-SCNC: 10 MEQ/L (ref 9–15)
BASOPHILS # BLD: 0 K/UL (ref 0–0.2)
BASOPHILS NFR BLD: 0.4 %
BUN SERPL-MCNC: 8 MG/DL (ref 6–20)
CALCIUM SERPL-MCNC: 9.6 MG/DL (ref 8.5–9.9)
CHLORIDE SERPL-SCNC: 102 MEQ/L (ref 95–107)
CO2 SERPL-SCNC: 27 MEQ/L (ref 20–31)
CREAT SERPL-MCNC: 0.58 MG/DL (ref 0.5–0.9)
EOSINOPHIL # BLD: 0.4 K/UL (ref 0–0.7)
EOSINOPHIL NFR BLD: 4 %
ERYTHROCYTE [DISTWIDTH] IN BLOOD BY AUTOMATED COUNT: 12.1 % (ref 11.5–14.5)
GLUCOSE SERPL-MCNC: 111 MG/DL (ref 70–99)
HCT VFR BLD AUTO: 36.6 % (ref 37–47)
HGB BLD-MCNC: 12.4 G/DL (ref 12–16)
LYMPHOCYTES # BLD: 1.6 K/UL (ref 1–4.8)
LYMPHOCYTES NFR BLD: 17.7 %
MCH RBC QN AUTO: 34.7 PG (ref 27–31.3)
MCHC RBC AUTO-ENTMCNC: 33.9 % (ref 33–37)
MCV RBC AUTO: 102.5 FL (ref 79.4–94.8)
MONOCYTES # BLD: 0.7 K/UL (ref 0.2–0.8)
MONOCYTES NFR BLD: 7.6 %
NEUTROPHILS # BLD: 6.3 K/UL (ref 1.4–6.5)
NEUTS SEG NFR BLD: 70 %
PLATELET # BLD AUTO: 383 K/UL (ref 130–400)
POTASSIUM SERPL-SCNC: 4.1 MEQ/L (ref 3.4–4.9)
RBC # BLD AUTO: 3.57 M/UL (ref 4.2–5.4)
SODIUM SERPL-SCNC: 139 MEQ/L (ref 135–144)
WBC # BLD AUTO: 9.1 K/UL (ref 4.8–10.8)

## 2023-12-20 PROCEDURE — 99232 SBSQ HOSP IP/OBS MODERATE 35: CPT | Performed by: PSYCHIATRY & NEUROLOGY

## 2023-12-20 PROCEDURE — 97530 THERAPEUTIC ACTIVITIES: CPT

## 2023-12-20 PROCEDURE — 6370000000 HC RX 637 (ALT 250 FOR IP): Performed by: PHYSICAL MEDICINE & REHABILITATION

## 2023-12-20 PROCEDURE — 97116 GAIT TRAINING THERAPY: CPT

## 2023-12-20 PROCEDURE — 99232 SBSQ HOSP IP/OBS MODERATE 35: CPT | Performed by: PHYSICAL MEDICINE & REHABILITATION

## 2023-12-20 PROCEDURE — 92507 TX SP LANG VOICE COMM INDIV: CPT

## 2023-12-20 PROCEDURE — 36415 COLL VENOUS BLD VENIPUNCTURE: CPT

## 2023-12-20 PROCEDURE — 85025 COMPLETE CBC W/AUTO DIFF WBC: CPT

## 2023-12-20 PROCEDURE — 97112 NEUROMUSCULAR REEDUCATION: CPT

## 2023-12-20 PROCEDURE — 80048 BASIC METABOLIC PNL TOTAL CA: CPT

## 2023-12-20 PROCEDURE — 97535 SELF CARE MNGMENT TRAINING: CPT

## 2023-12-20 PROCEDURE — 6370000000 HC RX 637 (ALT 250 FOR IP): Performed by: NURSE PRACTITIONER

## 2023-12-20 PROCEDURE — 1180000000 HC REHAB R&B

## 2023-12-20 PROCEDURE — 6370000000 HC RX 637 (ALT 250 FOR IP): Performed by: FAMILY MEDICINE

## 2023-12-20 PROCEDURE — 6360000002 HC RX W HCPCS: Performed by: FAMILY MEDICINE

## 2023-12-20 PROCEDURE — 92526 ORAL FUNCTION THERAPY: CPT

## 2023-12-20 RX ADMIN — Medication: at 07:37

## 2023-12-20 RX ADMIN — CARBIDOPA AND LEVODOPA 1 TABLET: 25; 100 TABLET ORAL at 13:59

## 2023-12-20 RX ADMIN — ENOXAPARIN SODIUM 40 MG: 100 INJECTION SUBCUTANEOUS at 07:37

## 2023-12-20 RX ADMIN — FAMOTIDINE 20 MG: 20 TABLET ORAL at 20:12

## 2023-12-20 RX ADMIN — SENNOSIDES 8.6 MG: 8.6 TABLET, FILM COATED ORAL at 20:12

## 2023-12-20 RX ADMIN — ATORVASTATIN CALCIUM 10 MG: 10 TABLET, FILM COATED ORAL at 20:12

## 2023-12-20 RX ADMIN — CARBIDOPA AND LEVODOPA 1 TABLET: 25; 100 TABLET ORAL at 05:47

## 2023-12-20 RX ADMIN — Medication 2000 UNITS: at 17:05

## 2023-12-20 RX ADMIN — FAMOTIDINE 20 MG: 20 TABLET ORAL at 07:37

## 2023-12-20 RX ADMIN — ZOLPIDEM TARTRATE 5 MG: 5 TABLET ORAL at 21:48

## 2023-12-20 RX ADMIN — Medication: at 20:13

## 2023-12-20 RX ADMIN — OXYBUTYNIN CHLORIDE 5 MG: 5 TABLET, EXTENDED RELEASE ORAL at 07:36

## 2023-12-20 RX ADMIN — Medication 100 MCG: at 07:36

## 2023-12-20 RX ADMIN — PANTOPRAZOLE SODIUM 40 MG: 40 TABLET, DELAYED RELEASE ORAL at 05:47

## 2023-12-20 RX ADMIN — LOSARTAN POTASSIUM 25 MG: 25 TABLET, FILM COATED ORAL at 07:36

## 2023-12-20 RX ADMIN — Medication 100 MG: at 07:37

## 2023-12-20 RX ADMIN — Medication 10 MG: at 20:12

## 2023-12-20 ASSESSMENT — PAIN SCALES - GENERAL: PAINLEVEL_OUTOF10: 0

## 2023-12-20 NOTE — PROGRESS NOTES
complete the puzzle d/t requiring trunk stability to reach outside of her base of support to reach all edges of the puzzle. Pt stood x2 min to complete a portion of the puzzle with no LOB. Pt returned to seated in w/c to complete the puzzle d/t fatigue. Pt completed the puzzle with 0 verbal cues and minimal difficulty. Sit to Stand  Assistance Level: Supervision  Skilled Clinical Factors: increased time to complete  Stand to Sit  Assistance Level: Supervision  Skilled Clinical Factors: increased time to complete  Stand Pivot  Assistance Level: Supervision  Skilled Clinical Factors: pt pivoted 180 degrees (d/t space restriction) from w/c to chair with arm rests. Pt w/ short, shuffling steps and increased time to complete the transfer                               Education:  Education  Education Given To: Patient  Education Provided: Role of Therapy;Plan of Care  Education Method: Verbal  Barriers to Learning: None  Education Outcome: Verbalized understanding    Equipment recommendations:  OT Equipment Recommendations  Other: continue to assess      ASSESSMENT:  Assessment: Pt demonstrated good BUE strength during session. Pt's static standing balance is limited by generalized weakness. Pt will benefit from continued OT to increase strength and endurance to maximize safety and independence in ADLs. Activity Tolerance: Patient tolerated treatment well      PLAN OF CARE:  Strengthening, Balance training, Functional mobility training, Endurance training, Safety education & training, Patient/Caregiver education & training, Equipment evaluation, education, & procurement, Neuromuscular re-education, Positioning, Home management training, Self-Care / ADL, Coordination training  Pt to continue OT POC    Patient goals : \"get my strength and dexterity back\"  Time Frame for Long Term Goals :  Within 2 weeks, pt to demonstrate progress in the following areas below to achieve specific LTGs as stated in the initial evaluation  Long Term Goal 1: Increase functional endurance  Long Term Goal 2: increase strength  Long Term Goal 3: increase ADL status        Therapy Time:   Individual Group Co-Treat   Time In 1500       Time Out 1530         Minutes 30                   Therapeutic activities: 30 minutes     Electronically signed by:    Arminda Duncan OT,   12/20/2023, 3:54 PM

## 2023-12-20 NOTE — PROGRESS NOTES
Physical Therapy Rehab Treatment Note  Facility/Department: [facility]  Room: R247/R247-01       NAME: Vanessa Amanda  : 1970 (83 y.o.)  MRN: [id number]  CODE STATUS: Full Code    Date of Service: 2023       Restrictions:  Restrictions/Precautions: Fall Risk  Position Activity Restriction  Other position/activity restrictions: Pt can have thin liquids-sips. Pt cannot have mixed food/liquid such as cold cereal and milk, chicken noodle soup       SUBJECTIVE:   Subjective: \"I was wondering if someone was going to come get me\"    Pain  Pain: no pain reported at start or end of session. OBJECTIVE:         Supine to Sit  Assistance Level: Modified independent  Skilled Clinical Factors: increased time and effort, uses bed rail to complete  Scooting  Assistance Level: Modified independent  Skilled Clinical Factors: scooting to [facility], uses ronald bed rails to complete    Transfers  Surface: Wheelchair; To bed  Additional Factors: Verbal cues; Increased time to complete;Hand placement cues  Device: Walker  Sit to Stand  Assistance Level: Supervision  Skilled Clinical Factors: good carry over with hand placement, improving efficiency  Stand to Sit  Assistance Level: Supervision  Skilled Clinical Factors: improving carry over with safe approach to wc as well as controlling descent with BUEs  Bed To/From Chair  Technique: Stand pivot  Assistance Level: Supervision  Skilled Clinical Factors: close SBA for safety  Car Transfer  Assistance Level: Modified independent  Skilled Clinical Factors: good carry over with technique and approach to car, improving efficiency    Ambulation  Surface: Level surface; Uneven surface; Ramp  Device: Rolling walker  Distance: 200', 45'  Activity: Within Unit; Other (Comment) (hallways)  Activity Comments: struggles with turns, obstacles, and tight spaces - small shuffling steps noted  Additional Factors: Verbal cues; Increased time to complete  Assistance Level: Stand by assist  Gait Deviations:

## 2023-12-20 NOTE — PLAN OF CARE
Nutrition Problem #1: Inadequate oral intake (improving)  Intervention: Food and/or Nutrient Delivery: Continue Current Diet, Continue Oral Nutrition Supplement

## 2023-12-20 NOTE — PROGRESS NOTES
support   \"E\" sustaining\"- 5 sec X2, 6 sec X1  ELIZ- X3 with good breath support  SHELBY- X2 with good breath support, loss of vocal intensity X1  Counting 1-15- X3 with good breath support    Goal 5: Pt will use breath and speech together appropriately during structured activities in 80% of opportunities with min verbal cues. ST encouraged patient to carry over voice strategies to improve vocal intensity during other therapies and conversations with other caregivers. Patient verbalized understanding. Goal 6: Goal met  Short-term Goals  Timeframe for Short-term Goals: 1-2 weeks  Goal 1: Patient will tolerate regular diet with thin liquids with no overt s/s of difficulty or aspiration. Patient consumed thin liquids via single sips by straw X8 with immediate throat clearing X1. Goal 2: Patient will demonstrate recommended swallow strategies for safe and efficient swallow at an independent level. Patient was I with small-single sips. Goal 3: Pt will complete labial/buccal/lingual ROM/strengthening/coordination exercises 10x/each with min cues, to promote safety and efficiency of oral phase of swallow. Patient completed lingual pull back X10 with good tolerance    With use of tongue depressor for resistance, patient completed lingual left and right X5, lingual protrusion X5, and lingual lateralization X5 with good tolerance. Treatment/Activity Tolerance:  Patient tolerated treatment well    Plan:  Continue per POC    Pain Assessment:  Patient does not c/o pain. Pain Re-assessment:  Patient does not c/o pain. Patient/Caregiver Education:  Patient educated on session and progression towards goals.     Safety Devices:  Call light within reach and Chair alarm in place      Dysphagia Outcome Severity Scale    SWALLOWING  Ratin      Speech Therapy Level of Assistance Scale  MOTOR SPEECH  Rating: Modified Independent    VOICE PRODUCTION  Rating: Supervised to minimal assistance    Therapy Time  SLP Individual Minutes  Time In: 1100  Time Out: 1130  Minutes: 30      Dysphagia 10  Voice 20      Signature: Electronically signed by LENNY Corral on 12/20/2023 at 11:44 AM

## 2023-12-20 NOTE — PROGRESS NOTES
Assessment complete, VSS. Denies pain or sore throat thus far. LBM today. Resting in bed at this time.  Electronically signed by Rosa Cardona RN on 12/20/23 at 2:14 PM EST

## 2023-12-20 NOTE — PROGRESS NOTES
Physical Therapy Rehab Treatment Note  [facility]  [facility]       NAME: Yash Sanchez  : 1970 (54 y.o.)  MRN: [id number]  CODE STATUS: Full Code    Date of Service: 2023       Restrictions:  Restrictions/Precautions: Fall Risk  Position Activity Restriction  Other position/activity restrictions: Pt can have thin liquids-sips. Pt cannot have mixed food/liquid such as cold cereal and milk, chicken noodle soup       SUBJECTIVE:   Subjective: \"I'm doing good\"    Pain  Pain: 1/10 pain reported in L hip/lower back. OBJECTIVE:            Transfers  Surface: Wheelchair; To chair with arms;From chair with arms;Standard toilet  Additional Factors: Verbal cues; Increased time to complete;Hand placement cues  Device: Walker  Sit to Stand  Assistance Level: Supervision  Skilled Clinical Factors: good carry over with hand placement, improving efficiency  Stand to Sit  Assistance Level: Supervision  Skilled Clinical Factors: improving carry over with safe approach to wc as well as controlling descent with BUEs    Ambulation  Surface: Level surface; Uneven surface; Carpet  Device: Rolling walker  Distance: 200', 150', 110', 2 x15'  Activity: Within Unit; Other (Comment); Within Room (hallways)  Activity Comments: struggles with turns, obstacles, and tight spaces - small shuffling steps noted  Additional Factors: Verbal cues; Increased time to complete  Assistance Level: Stand by assist  Gait Deviations: Slow felix;Decreased step length bilateral;Decreased weight shift bilateral;Decreased heel strike right;Decreased heel strike left; Wide base of support  Skilled Clinical Factors: vc's for improving step length and height, improving carry over though fades during turns/different surfaces    Stairs  Stair Height: 6''  Device: Bilateral handrails; One handrail  Number of Stairs: 4 with ronald handrails, 8 with L ascending handrail  Additional Factors: Verbal cues; Reciprocal going up; Increased time

## 2023-12-20 NOTE — PROGRESS NOTES
OCCUPATIONAL THERAPY  INPATIENT REHAB TREATMENT NOTE  [facility]      NAME: Donna Harvey  : 1970 (52 y.o.)  MRN: [id number]  CODE STATUS: Full Code  Room: H523/H005-59    Date of Service: 2023    Referring Physician: Dr. 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 female presented with recurrent falls and inability to care for self. Recent history of electrolyte abnormalities. MRI of the brain showed results suggestive of osmotic demyelination syndrome otherwise no acute intracranial abnormality. 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     Pain at start of treatment: No    Pain at end of treatment: Yes: 1/10    Location: L hip  Description: ache  Nursing notified: Declined  RN: N/A  Intervention: None    COGNITION:  Orientation  Overall Orientation Status: Within Functional Limits  Cognition  Overall Cognitive Status: WFL      Pt's current cognitive status is:  Comprehension: Independent  Expression: Independent  Social Interaction: Independent  Problem Solving: Independent  Memory: Independent    OBJECTIVE:       Morning ADL  Grooming/Oral Hygiene  Assistance Level: Modified independent  Skilled Clinical Factors: pt brushed teeth and applied deodorant seated at sink  Upper Extremity Bathing  Assistance Level: Modified independent  Skilled Clinical Factors: seated in shower chair, used long-handled scrub brush  Lower Extremity Bathing  Assistance Level: Supervision  Skilled Clinical Factors: pt stood to complete kristal care

## 2023-12-20 NOTE — PROGRESS NOTES
heart rate and rhythm with the option of telemetry and the effects of chronotropic medication with respect to increasing physical activity and exercise in PT, OT, ADLs with medication titration to lowest effective dosing. Continue blood signs every shift focusing on heart rate, rhythm and blood pressure checks with orthostatic checks-monitoring the effect of exercise, therapy and posture. Consult hospitalist for backup medical and adjust/add medications. Monitor heart rate and blood pressure as well as medications effects on vital signs before during and after therapy with especial focus on preventing orthostasis and falls risk. Sleep apnea-monitor overnight pulse ox provide supplemental O2 as needed    Hyponatremia-avoid excessive fluids avoid alcohol monitor sodium level    Recurrent falls,   Ataxic gait-focus on balance and therapy    Current smoker-NicoDerm patch    Chronic pain-topicals and lowest effective dose of pain medication    Overactive bladder-check postvoid residuals    Vitamin D deficiency-Add high-dose vitamin D, recheck vitamin D level out after discharge    Osmotic demyelination syndrome,   Central pontine myelinolysis     Alcohol abuse-avoid alcohol use provide emotional support encouraged12-step program  Sore throat-check for strep treat symptomatically. Review and simplify inpatient and outpatient medications and dosing times. Transition to self medication program if able.         Electronically signed by Delia García DO on 12/13/23 at 2:05 PM CRAIG Cordova D.O., PM&R     Attending    [address] [place] [address]

## 2023-12-20 NOTE — PROGRESS NOTES
Comprehensive Nutrition Assessment    Type and Reason for Visit:  Reassess    Nutrition Recommendations/Plan:   Continue Current Diet, Continue Oral Nutrition Supplement     Malnutrition Assessment:  Malnutrition Status:  Insufficient data (12/14/23 1311)    Context:  Social/Environmental Circumstances       Nutrition Assessment:    Pt presents with reported history of progressive weight loss over last 3-4 years (and as noted per EMR), with intermittent decreased appetite/intake. Pt reports improved appetite/intake with no GI complaints at this time. Pt eating lunch well when visited. To provide high calorie supplement tid with meals. To continue to follow.    Nutrition Related Findings:    PMH-htn, hld, GERD, etoh abuse; admitted to Rehab for 'severe acute on chronic progressive fatigue and  gait ataxia and quadriparesis'. Labs/meds reviewed. Pt receiving pepcid, ppi. BSE 12/13-regular consistancy with mildly thick liquids; advanced to regular liquids 12/14 per SLP. Last BM noted 12/20. No edema noted. Wound Type: None       Current Nutrition Intake & Therapies:    Average Meal Intake: 51-75%, %  Average Supplements Intake: %  ADULT DIET; Regular  ADULT ORAL NUTRITION SUPPLEMENT; Dinner, Lunch; Standard High Calorie/High Protein Oral Supplement    Anthropometric Measures:  Height: 172.7 cm (5' 7.99\")  Ideal Body Weight (IBW): 140 lbs (64 kg)    Admission Body Weight: 72.6 kg (160 lb) (stated 12/8)  Current Body Weight:  (n/a-please obtain current weight)  Current BMI (kg/m2):  23.5  Usual Body Weight: 86.2 kg (190 lb) ((6/2019 KH); 172lb (8/17/2023 stated); 162lb (1/12/2023 stated);)                       BMI Categories: Normal Weight (BMI 18.5-24.9)    Estimated Daily Nutrient Needs:  Energy Requirements Based On: Kcal/kg  Weight Used for Energy Requirements: Ideal  Energy (kcal/day): 2690-1319 (kg x 25-27)  Weight Used for Protein Requirements: Admission  Protein (g/day): 76-82 (kg x 1.2-1.3)  Method  Used for Fluid Requirements: 1 ml/kcal  Fluid (ml/day): ~1900    Nutrition Diagnosis:   Inadequate oral intake (improving) related to c/o previously decreased appetite(now improving)as evidenced by poor intake prior to admission, weight loss    Nutrition Interventions:   Food and/or Nutrient Delivery: Continue Current Diet, Continue Oral Nutrition Supplement  Nutrition Education/Counseling: No recommendation at this time  Coordination of Nutrition Care: Continue to monitor while inpatient       Goals:     Goals: PO intake 75% or greater (stable weight)       Nutrition Monitoring and Evaluation:      Food/Nutrient Intake Outcomes: Food and Nutrient Intake, Supplement Intake  Physical Signs/Symptoms Outcomes: Weight    Discharge Planning:     Too soon to determine     Vasyl RENATO Ervin, LD

## 2023-12-20 NOTE — CARE COORDINATION
[facility]  INPATIENT REHABILITATION  TEAM CONFERENCE NOTE  [facility]  Admit Date: 2023       Date: 2023  Patient Name: Nancy Jean        MRN: [id number]    : 1970  (53 y.o.)  Gender: female        REHAB DIAGNOSIS:   Diagnosis: Impaired mobility and activities of daily living dt osmotic demyelination syndrome    CO MORBIDITIES:      Past Medical History:   Diagnosis Date    Anxiety     Heartburn     Hyperlipidemia     Hypertension     Sleep apnea      Past Surgical History:   Procedure Laterality Date    COLONOSCOPY N/A 2023    COLONOSCOPY DIAGNOSTIC performed by Fabrice Vazquez MD at [facility]    HERNIA REPAIR Right     TOTAL VAGINAL HYSTERECTOMY  2007    UPPER GASTROINTESTINAL ENDOSCOPY N/A 2023    EGD DIAGNOSTIC ONLY performed by Fabrice Vazquez MD at [facility]        Restrictions  Restrictions/Precautions: Fall Risk  Position Activity Restriction  Other position/activity restrictions: Pt can have thin liquids-sips. Pt cannot have mixed food/liquid such as cold cereal and milk, chicken noodle soup  CASE MANAGEMENT    Social/Functional History  Social/Functional History  Lives With: Alone (stayed with mom recently secondary to hospital discharge)  Type of Home: House  Home Layout: One level  Home Access: Stairs to enter with rails  Entrance Stairs - Number of Steps: 3  Entrance Stairs - Rails: Left (up)  Bathroom Shower/Tub: Tub/Shower unit, Walk-in shower, Doors (normally uses walk in)  Bathroom Toilet: Standard  Bathroom Equipment: None  Bathroom Accessibility: Walker accessible  Home Equipment:  (PCP gave mother script for a wheelchair but they did not get any DME yet)  Has the patient had two or more falls in the past year or any fall with injury in the past year?: Yes  Receives Help From: Family  ADL Assistance: Independent  Homemaking Assistance: Independent  Homemaking Responsibilities: Yes  Meal Prep Responsibility:  (pt sometimes  Patient/Family education, Oral motor exercises      LTG:  Long Term Goals  Time Frame for Long Term Goals: 2-3 weeks  Goal 1: Pt will improve her Speech Intelligibility to Modified I for effective communication of wants, needs, feelings, ideas, and medical/safety information with familiar and unfamiliar listeners. Goal 2: Pt will improve her Voice production to supervised assist for effective communication of wants, needs, feelings, ideas, and medical/safety information with familiar and unfamiliar listeners. Goal 3: Goal met  Long-term Goals  Timeframe for Long-term Goals: 2-3 weeks  Goal 1: Patient will tolerate LRD without overt s/s of aspiration with all po trials. COGNITION  OT: Cognition  Overall Cognitive Status: WFL (12/21/23 0906)  Cognition Comment: slower processing (12/16/23 1203)          Orientation  Overall Orientation Status: Within Normal Limits (12/21/23 0906)  Orientation Level: Oriented X4 (12/21/23 0906)  SP:        RECREATIONAL THERAPY  Attendance to recreational therapy programs:    []  Pet Therapy  [] Music Therapy  [] Art Therapy    [] Recreation Therapy Group [] Support Group           Patient social interaction (mood, participation): good    Patient strengths: motivated, independent prior    Patients goal: go home vs mom's home    Problems/Barriers: motor control difficulties, difficulty with recall at times, parkinsonian features        1. Safety:          - Intervention / Plan:    [x]  falls protocol     [x]  PT/OT    [x]  SP        - Results:         2. Potential DME needs:         - Intervention / Plan:  [x]  PT/OT     [x]  Assess equipment needs/access       - Results:         3. Weakness:          - Intervention / Plan:  [x]  PT/OT      []  Other:         - Results:         4.   Discharge planning needs:          - Intervention / Plan:  [x]  Weekly team conference      [x]  family training        - Results:         5.            - Intervention / Plan:          - Results:

## 2023-12-21 PROCEDURE — 6360000002 HC RX W HCPCS: Performed by: FAMILY MEDICINE

## 2023-12-21 PROCEDURE — 6370000000 HC RX 637 (ALT 250 FOR IP): Performed by: NURSE PRACTITIONER

## 2023-12-21 PROCEDURE — 97112 NEUROMUSCULAR REEDUCATION: CPT

## 2023-12-21 PROCEDURE — 97535 SELF CARE MNGMENT TRAINING: CPT

## 2023-12-21 PROCEDURE — 97110 THERAPEUTIC EXERCISES: CPT

## 2023-12-21 PROCEDURE — 99233 SBSQ HOSP IP/OBS HIGH 50: CPT | Performed by: PHYSICAL MEDICINE & REHABILITATION

## 2023-12-21 PROCEDURE — 6370000000 HC RX 637 (ALT 250 FOR IP): Performed by: FAMILY MEDICINE

## 2023-12-21 PROCEDURE — 6370000000 HC RX 637 (ALT 250 FOR IP): Performed by: PHYSICAL MEDICINE & REHABILITATION

## 2023-12-21 PROCEDURE — 1180000000 HC REHAB R&B

## 2023-12-21 PROCEDURE — 92507 TX SP LANG VOICE COMM INDIV: CPT

## 2023-12-21 PROCEDURE — 97530 THERAPEUTIC ACTIVITIES: CPT

## 2023-12-21 PROCEDURE — 94150 VITAL CAPACITY TEST: CPT

## 2023-12-21 PROCEDURE — 97116 GAIT TRAINING THERAPY: CPT

## 2023-12-21 RX ADMIN — CARBIDOPA AND LEVODOPA 1 TABLET: 25; 100 TABLET ORAL at 14:15

## 2023-12-21 RX ADMIN — Medication: at 14:17

## 2023-12-21 RX ADMIN — FAMOTIDINE 20 MG: 20 TABLET ORAL at 21:31

## 2023-12-21 RX ADMIN — Medication 10 MG: at 21:31

## 2023-12-21 RX ADMIN — Medication 100 MCG: at 09:07

## 2023-12-21 RX ADMIN — FAMOTIDINE 20 MG: 20 TABLET ORAL at 09:07

## 2023-12-21 RX ADMIN — Medication: at 12:14

## 2023-12-21 RX ADMIN — Medication 2000 UNITS: at 17:16

## 2023-12-21 RX ADMIN — Medication: at 21:23

## 2023-12-21 RX ADMIN — BENZOCAINE AND MENTHOL 1 LOZENGE: 15; 3.6 LOZENGE ORAL at 12:18

## 2023-12-21 RX ADMIN — ATORVASTATIN CALCIUM 10 MG: 10 TABLET, FILM COATED ORAL at 21:31

## 2023-12-21 RX ADMIN — OXYBUTYNIN CHLORIDE 5 MG: 5 TABLET, EXTENDED RELEASE ORAL at 09:07

## 2023-12-21 RX ADMIN — CARBIDOPA AND LEVODOPA 1 TABLET: 25; 100 TABLET ORAL at 05:33

## 2023-12-21 RX ADMIN — PANTOPRAZOLE SODIUM 40 MG: 40 TABLET, DELAYED RELEASE ORAL at 05:33

## 2023-12-21 RX ADMIN — ENOXAPARIN SODIUM 40 MG: 100 INJECTION SUBCUTANEOUS at 09:07

## 2023-12-21 RX ADMIN — LOSARTAN POTASSIUM 25 MG: 25 TABLET, FILM COATED ORAL at 09:07

## 2023-12-21 RX ADMIN — SENNOSIDES 8.6 MG: 8.6 TABLET, FILM COATED ORAL at 21:31

## 2023-12-21 RX ADMIN — Medication 100 MG: at 09:07

## 2023-12-21 ASSESSMENT — PAIN SCALES - GENERAL
PAINLEVEL_OUTOF10: 2
PAINLEVEL_OUTOF10: 0

## 2023-12-21 ASSESSMENT — PAIN DESCRIPTION - LOCATION: LOCATION: BACK

## 2023-12-21 ASSESSMENT — PAIN DESCRIPTION - DESCRIPTORS: DESCRIPTORS: ACHING

## 2023-12-21 ASSESSMENT — PAIN DESCRIPTION - ORIENTATION: ORIENTATION: LOWER

## 2023-12-21 NOTE — PROGRESS NOTES
Physical Therapy Rehab Treatment Note  [facility]       NAME: Donna Harvey  : 1970 (79 y.o.)  MRN: [id number]  CODE STATUS: Full Code    Date of Service: 2023       Restrictions:  Restrictions/Precautions: Fall Risk  Position Activity Restriction  Other position/activity restrictions: Pt can have thin liquids-sips. Pt cannot have mixed food/liquid such as cold cereal and milk, chicken noodle soup       SUBJECTIVE:   Subjective: \"I need to talk to Diomedes Betancourt about getting a wheelchair and a walker for home\"    Pain  Pain: 2/10 pain in L hip/lower bacl, denies need for intervention. OBJECTIVE:         Transfers  Surface: Wheelchair  Additional Factors: Verbal cues; Increased time to complete;Hand placement cues  Device: Walker  Sit to Stand  Assistance Level: Modified independent  Skilled Clinical Factors: good carry over with hand placement, improving efficiency  Stand to Sit  Assistance Level: Supervision  Skilled Clinical Factors: improving carry over with safe approach to wc as well as controlling descent with BUEs      Ambulation  Surface: Level surface; Uneven surface; Carpet  Device: Rolling walker  Distance: 210'  Activity: Within Unit  Activity Comments: struggles with turns, obstacles, and tight spaces - small shuffling steps noted  Additional Factors: Verbal cues; Increased time to complete  Assistance Level: Stand by assist;Supervision  Gait Deviations: Slow felix;Decreased step length bilateral;Decreased weight shift bilateral;Decreased heel strike right;Decreased heel strike left; Wide base of support  Skilled Clinical Factors: improving carry over with step length and height. supervision with short distances and in familiar environment, SBA with longer distances    Curb  Curb Height: 6''  Device: Rolling walker  Number of Curbs: 6  Additional Factors: Set-up; Verbal cues; Non-reciprocal going up;Non-reciprocal going down; Increased time to

## 2023-12-21 NOTE — PROGRESS NOTES
Physical Therapy Rehab Treatment Note  Facility/Department: [facility]  Room: R2/R247-01       NAME: Tyesha Khanna  : 1970 (29 y.o.)  MRN: [id number]  CODE STATUS: Full Code    Date of Service: 2023       Restrictions:  Restrictions/Precautions: Fall Risk  Position Activity Restriction  Other position/activity restrictions: Pt can have thin liquids-sips. Pt cannot have mixed food/liquid such as cold cereal and milk, chicken noodle soup       SUBJECTIVE:   Subjective: Pt mother present for family training. Pain  Pain: 2/10 pain in L hip/lower back, denies need for intervention. OBJECTIVE:         Sit to Supine  Assistance Level: Modified independent  Skilled Clinical Factors: increased time and effort, though improving  Scooting  Assistance Level: Modified independent  Skilled Clinical Factors: scooting to [facility], uses ronald bed rails to complete    Transfers  Surface: Wheelchair; To chair without arms;From chair without arms;Standard toilet  Additional Factors: Verbal cues; Increased time to complete;Hand placement cues  Device: Walker  Sit to Stand  Assistance Level: Supervision  Skilled Clinical Factors: good carry over with hand placement, improving efficiency  Stand to Sit  Assistance Level: Supervision  Skilled Clinical Factors: improving carry over with safe approach to wc as well as controlling descent with BUEs  Car Transfer  Assistance Level: Modified independent  Skilled Clinical Factors: completed in pt's mother's car, both passenger and back seat. good carry over with technique and approach to car, improving efficiency    Ambulation  Surface: Level surface; Uneven surface; Carpet  Device: Rolling walker  Distance: 45', 225', 125', 2 x15'  Activity: Within Unit  Activity Comments: struggles with turns, obstacles, and tight spaces - small shuffling steps noted  Additional Factors: Verbal cues; Increased time to complete  Assistance Level: Stand by assist;Supervision  Gait Deviations: Slow

## 2023-12-21 NOTE — CARE COORDINATION
CM spoke with the patient and her mom . They were given update on the team meeting. A wheeled walker would be ordered. Pass was okay for Sunday. The training went well. Discharge remains unchanged.  Electronically signed by Mariah Simms RN on 12/21/2023 at 5:20 PM

## 2023-12-21 NOTE — PROGRESS NOTES
risk.    Sleep apnea-monitor overnight pulse ox provide supplemental O2 as needed    Hyponatremia-avoid excessive fluids avoid alcohol monitor sodium level  Parkinsonian gait with recurrent falls,   Ataxic gait-focus on balance and therapy-neurology consult in titrating Sinemet    Current smoker-NicoDerm patch    Chronic pain-topicals and lowest effective dose of pain medication    Overactive bladder-check postvoid residuals    Vitamin D deficiency-  high-dose vitamin D, recheck vitamin D level out after discharge    Osmotic demyelination syndrome,   Central pontine myelinolysis     Alcohol abuse-avoid alcohol use provide emotional support encouraged12-step program  Sore throat-check for strep treat symptomatically. Focus on reassessing rehab goals and coordinating therapy and medical self care issues. Adjust Sinemet dosing.         Electronically signed by Philly Oropeza DO on 12/13/23 at 2:05 PM CRAIG Amado D.O., PM&R     Attending    [address] [place] [address]

## 2023-12-21 NOTE — PROGRESS NOTES
INDIVIDUALIZED OVERALL REHAB PLAN OF CARE  ADDENDUM TO REHAB PROGRESS NOTE-for audit purposes must also refer to this day's clinical note and combine the information      Date: 2023  Patient Name: Nancy Jean   Room: R247/R247-01    MRN: [id number]    : 1970  (53 y.o.)  Gender: female       Today 2023 during weekly team meeting, I reviewed the patient Nancy Jean in detail with the therapists and nurses involved in patient's care gathering complex physiatric data regarding current medical issues, progress in therapies, factors limiting progress, social issues, psychological issues, ongoing therapeutic plans and discharge planning.    Legend:  I= independent Im =Modified independent  S=Supervised SB=stand by HERNANDES=set up CG=contact adry Min= minimal Mod=Moderate Max=maximal Max of 2 =maximal assist of 2 people      CURRENT FUNCTIONAL STATUS:     Patient presented through the emergency room with falls and dizziness.  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  A CTA of the neck showed noncalcified plaque with posterior wall ulceration of the origin of the right ICA without stenosis.     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  It was felt to be an aftermath of patient's underlying alcoholism and hyponatremia with a sodium as low as 105 with correction.  It was felt that nutritional demyelination was also likely comorbidity.  Oropharyngeal dysphagia was monitored for period.    Parkinsonian features- doing well on Sinemet--will need dose adjusted    Needs training today for [place] on [place].  She is getting family training today.    NURSING ISSUES:   Vital signs taken. Pt denies any pain at this time. Pt is A&O x4. Pt is slow to respond, flat affect, and diminished lung sounds. Pt chose not to have a pur  Aide       [x]  RN                    Equipment:  [employer],        At D/C their function is goaled at:   PT:Long Term Goal 1: Pt to complete bed mobility with indep  Long Term Goal 2: Pt to complete transfers with indep  Long Term Goal 3: Pt to ambulate 150ft with LRD and indep  Long Term Goal 4: Pt to manage 3 steps with HR and indep  Long Term Goal 5: Pt to complete HEP with indep  OT:Eating  Assistance Needed: Setup or clean-up assistance  CARE Score: 5  Discharge Goal: Independent, Oral Hygiene  Assistance Needed: Supervision or touching assistance  CARE Score: 4  Discharge Goal: Independent, [address] needed: Substantial/maximal assistance  CARE Score: 2  Discharge Goal: Supervision or touching assistance, Shower/Bathe Self  Assistance Needed: Partial/moderate assistance  CARE Score: 3  Discharge Goal: Supervision or touching assistance  Upper Body Dressing  Assistance Needed: Partial/moderate assistance  CARE Score: 3  Discharge Goal: Independent, Lower Body Dressing  Assistance Needed: Substantial/maximal assistance  CARE Score: 2  Discharge Goal: Supervision or touching assistance, Putting On/Taking Off Footwear  Assistance Needed: Substantial/maximal assistance  CARE Score: 2  Discharge Goal: Supervision or touching assistance, Toilet Transfer  Assistance needed: Partial/moderate assistance  CARE Score: 3  Discharge Goal: Supervision or touching assistance  SP:Long Term Goals  Time Frame for Long Term Goals: 2-3 weeks  Goal 1: Pt will improve her Speech Intelligibility to Modified I for effective communication of wants, needs, feelings, ideas, and medical/safety information with familiar and unfamiliar listeners. Goal 2: Pt will improve her Voice production to supervised assist for effective communication of wants, needs, feelings, ideas, and medical/safety information with familiar and unfamiliar listeners.   Goal 3: Goal met  Long-term Goals  Timeframe for Long-term Goals: 2-3 weeks  Goal 1:

## 2023-12-21 NOTE — PROGRESS NOTES
[facility]  Facility/Department: [facility]  Speech Language Pathology   Treatment Note          Saul Arceo  1970  [id number]/[id number]  [x]   confirmed    Date: 2023      Restrictions/Precautions: Fall Risk  Position Activity Restriction  Other position/activity restrictions: Pt can have thin liquids-sips. Pt cannot have mixed food/liquid such as cold cereal and milk, chicken noodle soup    ADULT DIET; Regular  ADULT ORAL NUTRITION SUPPLEMENT; Dinner, Lunch; Standard High Calorie/High Protein Oral Supplement    Respiratory Status: O2 Flow Rate (L/min): 0 L/min (235)   No active isolations    Rehab Diagnosis: Impaired mobility and activities of daily living dt osmotic demyelination syndrome     Subjective:  Alert, Cooperative, and Pleasant        Interventions used this date:  Voice Treatment    Objective/Assessment:  Patient progressing towards goals:  Short Term Goals  Time Frame for Short Term Goals: 1-2 weeks  Goal 3: The patient will read phrases/sentences out loud using appropriate voicing in 90% of opportunities given min verbal cues to utilize voice strategies. Goal 4: Pt will use abdominal breathing and appropritate intonation in a conversation to support phonation in 80% with min verbal cues. Patient received an incentive spirometer from nursing. Patient demonstrated X6 the proper use of the spirometer with good control reaching goal of 750 with each attempt. With use of good breath support and increased vocal intensity patient counted 1-15 X2 with good vocal quality and intensity. Goal 5: Pt will use breath and speech together appropriately during structured activities in 80% of opportunities with min verbal cues. Patient and Yodit Marcelino went to the [employer] shop in the [facility] to focus on increasing breath support and vocal intensity during conversation with unfamiliar listener.  Coffee shop employee asked the patient X1 out of 6 conversations to repeat secondary to reduced volume of

## 2023-12-21 NOTE — FLOWSHEET NOTE
[facility] nursing  assessment completed. Complaints of:      Lower back discomfort      Dressings/ SKIN :            VOIDING:       Oxygen: hs cpap  Precautions:              Falls:   65     Talib:    18         LAST  BM:  20         BLOOD THINNERS:    lovenox   Chart and meds reviewed. Noted Labs:   PRN MEDS given:  DAILY ticket to ride updated. Therapy scheduled updated on whiteboard and shared with pt. Call light in reach. Alarms maintained as needed.      NOTES:

## 2023-12-21 NOTE — PROGRESS NOTES
Pt assessment completed. Vital signs taken. Pt denies any pain at this time. Pt is A&O x4. Pt is slow to respond, flat affect, and diminished lung sounds. Pt chose not to have a pur wick attached and used call light appropriately. Pt is walking with assistance to use the restroom.  Pt using cpap machine after being given their Ambien per request.

## 2023-12-21 NOTE — PROGRESS NOTES
OCCUPATIONAL THERAPY  INPATIENT REHAB TREATMENT NOTE  [facility]      NAME: Rajinedr Aleman  : 1970 (52 y.o.)  MRN: [id number]  CODE STATUS: Full Code  Room: J517/A683-32    Date of Service: 2023    Referring Physician: Dr. 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 female presented with recurrent falls and inability to care for self. Recent history of electrolyte abnormalities. MRI of the brain showed results suggestive of osmotic demyelination syndrome otherwise no acute intracranial abnormality. 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      Restrictions  Restrictions/Precautions  Restrictions/Precautions: Fall Risk                 Patient's date of birth confirmed: Yes    SAFETY:  Safety Devices  Safety Devices in place: Yes  Type of devices: All fall risk precautions in place    SUBJECTIVE:       Pain at start of treatment: Yes: 4/10    Pain at end of treatment: Yes: 4/10    Location: low back  Nursing notified: Declined    COGNITION:  Orientation  Overall Orientation Status: Within Normal Limits  Orientation Level: Oriented X4  Cognition  Overall Cognitive Status: WFL      OBJECTIVE:     Pt completed laundry folding task seated at table top level to improve functional act  tolerance, strength and ability to improve functional IADL ability. Pt folded towels, pillow cases and wash cloths using B UEs with slight overhead reaching prn while folding with 1/2 # wrist wts applied. Pt was able to fold all with efficient time and good pacing. Pt demonstrated no difficulty with task. Provided pt with table top creative holiday activity seated at table using 1/2# wrist wts with supervision provided.   Pt demo difficulty with peeling off stickers associated with task, req Min A d/t stickers being difficult to peel off in places to avoid ripping

## 2023-12-21 NOTE — PROGRESS NOTES
OCCUPATIONAL THERAPY  INPATIENT REHAB TREATMENT NOTE  [facility]      NAME: Devon Tejada  : 1970 (52 y.o.)  MRN: [id number]  CODE STATUS: Full Code  Room: K271/B751-00    Date of Service: 2023    Referring Physician: Dr. 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 female presented with recurrent falls and inability to care for self. Recent history of electrolyte abnormalities. MRI of the brain showed results suggestive of osmotic demyelination syndrome otherwise no acute intracranial abnormality. 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      Restrictions  Restrictions/Precautions  Restrictions/Precautions: Fall Risk       Patient's date of birth confirmed: Yes    SAFETY:  Safety Devices  Safety Devices in place: Yes  Type of devices: All fall risk precautions in place    SUBJECTIVE:       Pain at start of treatment: Yes: 2/10    Pain at end of treatment: Yes: 2/10    Location: L lower back/pelvis/hip area  Nursing notified: Yes  RN: chris  Intervention: None Pt declined any pain intervention    COGNITION:  Orientation  Overall Orientation Status: Within Normal Limits  Orientation Level: Oriented X4  Cognition  Overall Cognitive Status: WFL      OBJECTIVE:       Lower Extremity Dressing  Assistance Level: Supervision  Putting On/Taking Off Footwear  Assistance Level: Minimal assistance  Skilled Clinical Factors: TD (A) to don TEDS over feet then pt finished pulling them up.  Pt donned shoes with mod (I) seated  Toileting  Skilled Clinical Factors: pt declined the need to toilet  Toilet Transfers  Skilled Clinical Factors: pt declined  Tub/Shower Transfers  Skilled Clinical Factors: no shower/sponge bath- pt had already sponged d/t understanding she was completing PT tasks at 9:00 am. Discussed re-adjusting shower day to tomorrow d/t scheduling Verbalized understanding;Demonstrated understanding       ASSESSMENT:  Assessment: Pt demo decreased balance, shuffling gait, needing Nani if moving/unsupported.  Pt a little aggravated upon arrival d/t scheduling conflict.  This had pt nerves on edge some and pt had a little anxiousness.  D/t prior functional status, and pt demo'ing increased difficulty with mobility, increased worry and aggravation can effect pt functional status d/t nerve associated muscle involvement with voluntary muscle movement for mobility increasing risk of falls.  Activity Tolerance: Patient tolerated treatment well      PLAN OF CARE:  Strengthening, Balance training, Functional mobility training, Endurance training, Safety education & training, Patient/Caregiver education & training, Equipment evaluation, education, & procurement, Neuromuscular re-education, Positioning, Home management training, Self-Care / ADL, Coordination training  Pt to continue OT POC 12/30    Patient goals : \"get my strength and dexterity back\"  Time Frame for Long Term Goals : Within 2 weeks, pt to demonstrate progress in the following areas below to achieve specific LTGs as stated in the initial evaluation  Long Term Goal 1: Increase functional endurance  Long Term Goal 2: increase strength  Long Term Goal 3: increase ADL status        Therapy Time:   Individual Group Co-Treat   Time In 0830       Time Out 0930         Minutes 60                   ADL/IADL training: 15 minutes  Neuromuscular reeducation: 15 minutes  Therapeutic activities: 30 minutes     Electronically signed by:    MICHELA Martin,   12/21/2023, 11:33 AM

## 2023-12-22 PROCEDURE — 99232 SBSQ HOSP IP/OBS MODERATE 35: CPT | Performed by: PSYCHIATRY & NEUROLOGY

## 2023-12-22 PROCEDURE — 1180000000 HC REHAB R&B

## 2023-12-22 PROCEDURE — 6370000000 HC RX 637 (ALT 250 FOR IP): Performed by: PSYCHIATRY & NEUROLOGY

## 2023-12-22 PROCEDURE — 6370000000 HC RX 637 (ALT 250 FOR IP): Performed by: NURSE PRACTITIONER

## 2023-12-22 PROCEDURE — 97535 SELF CARE MNGMENT TRAINING: CPT

## 2023-12-22 PROCEDURE — 6370000000 HC RX 637 (ALT 250 FOR IP): Performed by: PHYSICAL MEDICINE & REHABILITATION

## 2023-12-22 PROCEDURE — 97112 NEUROMUSCULAR REEDUCATION: CPT

## 2023-12-22 PROCEDURE — 97116 GAIT TRAINING THERAPY: CPT

## 2023-12-22 PROCEDURE — 92507 TX SP LANG VOICE COMM INDIV: CPT

## 2023-12-22 PROCEDURE — 99232 SBSQ HOSP IP/OBS MODERATE 35: CPT | Performed by: PHYSICAL MEDICINE & REHABILITATION

## 2023-12-22 PROCEDURE — 92526 ORAL FUNCTION THERAPY: CPT

## 2023-12-22 PROCEDURE — 97530 THERAPEUTIC ACTIVITIES: CPT

## 2023-12-22 PROCEDURE — 6370000000 HC RX 637 (ALT 250 FOR IP): Performed by: FAMILY MEDICINE

## 2023-12-22 PROCEDURE — 6360000002 HC RX W HCPCS: Performed by: FAMILY MEDICINE

## 2023-12-22 RX ADMIN — HYDROCORTISONE: 25 CREAM TOPICAL at 12:09

## 2023-12-22 RX ADMIN — FAMOTIDINE 20 MG: 20 TABLET ORAL at 21:38

## 2023-12-22 RX ADMIN — Medication: at 08:43

## 2023-12-22 RX ADMIN — ENOXAPARIN SODIUM 40 MG: 100 INJECTION SUBCUTANEOUS at 08:41

## 2023-12-22 RX ADMIN — OXYBUTYNIN CHLORIDE 5 MG: 5 TABLET, EXTENDED RELEASE ORAL at 08:41

## 2023-12-22 RX ADMIN — LOSARTAN POTASSIUM 25 MG: 25 TABLET, FILM COATED ORAL at 08:40

## 2023-12-22 RX ADMIN — Medication: at 08:45

## 2023-12-22 RX ADMIN — SENNOSIDES 8.6 MG: 8.6 TABLET, FILM COATED ORAL at 21:38

## 2023-12-22 RX ADMIN — Medication 2000 UNITS: at 17:35

## 2023-12-22 RX ADMIN — Medication: at 14:11

## 2023-12-22 RX ADMIN — Medication 10 MG: at 21:37

## 2023-12-22 RX ADMIN — PANTOPRAZOLE SODIUM 40 MG: 40 TABLET, DELAYED RELEASE ORAL at 05:11

## 2023-12-22 RX ADMIN — ZOLPIDEM TARTRATE 5 MG: 5 TABLET ORAL at 22:36

## 2023-12-22 RX ADMIN — Medication 100 MCG: at 08:40

## 2023-12-22 RX ADMIN — Medication: at 21:39

## 2023-12-22 RX ADMIN — ATORVASTATIN CALCIUM 10 MG: 10 TABLET, FILM COATED ORAL at 21:38

## 2023-12-22 RX ADMIN — FAMOTIDINE 20 MG: 20 TABLET ORAL at 08:40

## 2023-12-22 RX ADMIN — CARBIDOPA AND LEVODOPA 1 TABLET: 25; 100 TABLET ORAL at 05:11

## 2023-12-22 RX ADMIN — Medication 100 MG: at 08:40

## 2023-12-22 RX ADMIN — CARBIDOPA AND LEVODOPA 1 TABLET: 25; 100 TABLET ORAL at 17:34

## 2023-12-22 RX ADMIN — Medication: at 21:38

## 2023-12-22 NOTE — CARE COORDINATION
Referral made to [facility] per pt request. [place] also spoke with Burt Najera from the [facility], who is covering for the RN case manager from pt's PCP. Burt Najera explained that LSW would be able to get DME faster than he would but that he would still look to see if the purewick is covered. Orders sent over to [address] for wheelchair and ww, per pt request she no longer wants the bsc. Fátima Kong at [address] confirmed that they can accept pt's insurance and can supply the 18\" wheelchair and ww. Documentation for wheelchair will be obtained from [address] [place] [address].  Electronically signed by ROSEMARY Valenzuela, MADELYNW on 12/22/2023 at 4:43 PM

## 2023-12-22 NOTE — PROGRESS NOTES
OCCUPATIONAL THERAPY  INPATIENT REHAB TREATMENT NOTE  [facility]      NAME: Nancy Jean  : 1970 (53 y.o.)  MRN: [id number]  CODE STATUS: Full Code  Room: [facility]/R247-01    Date of Service: 2023    Referring Physician: Dr. 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 female presented with recurrent falls and inability to care for self. Recent history of electrolyte abnormalities. MRI of the brain showed results suggestive of osmotic demyelination syndrome otherwise no acute intracranial abnormality. 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      Restrictions  Restrictions/Precautions  Restrictions/Precautions: Fall Risk                 Patient's date of birth confirmed: Yes    SAFETY:  Safety Devices  Safety Devices in place: Yes  Type of devices: All fall risk precautions in place    SUBJECTIVE:       Pain at start of treatment: No    Pain at end of treatment: No      COGNITION:  Orientation  Overall Orientation Status: Within Normal Limits  Orientation Level: Oriented X4  Cognition  Overall Cognitive Status: WFL      OBJECTIVE:         Putting On/Taking Off Footwear  Assistance Level: Minimal assistance  Skilled Clinical Factors: donned shoes with mod (I) seated  Toileting  Assistance Level: Supervision  Skilled Clinical Factors: pt declined the need to toilet  Toilet Transfers  Technique: Stand step  Equipment: Standard toilet;Grab bars  Additional Factors: Verbal cues  Assistance Level: Contact guard assist  Skilled Clinical Factors: pt declined       Functional Mobility  Device: Rolling walker  Activity: To/From bathroom  Assistance Level: Stand by assist  Skilled Clinical Factors: vc's to  feet more and sequencing for stepping over threshold into bathroom with walker  Supine to Sit  Assistance Level: Modified  ADL/IADL training: 10 minutes  Therapeutic activities: 20 minutes     Electronically signed by:     MICHELA Clay,   12/22/2023, 1:42 PM

## 2023-12-22 NOTE — PLAN OF CARE
Problem: Safety - Adult  Goal: Free from fall injury  Outcome: Progressing     Problem: Discharge Planning  Goal: Discharge to home or other facility with appropriate resources  Outcome: Progressing  Flowsheets (Taken 12/21/2023 0900 by Kristine Beckwith RN)  Discharge to home or other facility with appropriate resources: Identify barriers to discharge with patient and caregiver     Problem: Pain  Goal: Verbalizes/displays adequate comfort level or baseline comfort level  Outcome: Progressing     Problem: Skin/Tissue Integrity  Goal: Absence of new skin breakdown  Description: 1. Monitor for areas of redness and/or skin breakdown  2. Assess vascular access sites hourly  3. Every 4-6 hours minimum:  Change oxygen saturation probe site  4. Every 4-6 hours:  If on nasal continuous positive airway pressure, respiratory therapy assess nares and determine need for appliance change or resting period.   Outcome: Progressing     Problem: Nutrition Deficit:  Goal: Optimize nutritional status  Outcome: Progressing

## 2023-12-22 NOTE — PROGRESS NOTES
[facility]  Facility/Department: [facility]  Speech Language Pathology   Treatment Note          Radha Chawla  1970  [id number]/[id number]  [x]   confirmed    Date: 2023      Restrictions/Precautions: Fall Risk  Position Activity Restriction  Other position/activity restrictions: Pt can have thin liquids-sips. Pt cannot have mixed food/liquid such as cold cereal and milk, chicken noodle soup    ADULT DIET; Regular  ADULT ORAL NUTRITION SUPPLEMENT; Dinner, Lunch; Standard High Calorie/High Protein Oral Supplement    Respiratory Status: O2 Flow Rate (L/min): 0 L/min (235)   No active isolations    Rehab Diagnosis: Impaired mobility and activities of daily living dt osmotic demyelination syndrome     Subjective:  Alert and Cooperative        Interventions used this date:  Dysphagia Treatment, Oral Motor Treatment, and Voice Treatment    Objective/Assessment:  Patient progressing towards goals:  Short Term Goals  Time Frame for Short Term Goals: 1-2 weeks  Goal 3: The patient will read phrases/sentences out loud using appropriate voicing in 90% of opportunities given min verbal cues to utilize voice strategies. Patient read ADL sentences out loud with good breath support and vocal intensity I with 100% accuracy. ST and patient discussed carry over of increased vocal intensity and breath support during conversations with staff. Goal 4: Pt will use abdominal breathing and appropritate intonation in a conversation to support phonation in 80% with min verbal cues.  Patient completed the following diaphragmatic breathing exercises with adequate breath support and vocal quality   1-20: X3 with good breath support/vocal intensity  SHELBY: X3 with good breath support/vocal intensity  ELIZ: X2/3 with good breath support/vocal intensity, vocal strain noted X1  ABCs: X2/3 with good breath support/vocal intensity     Short-term Goals  Timeframe for Short-term Goals: 1-2 weeks  Goal 1: Patient will tolerate

## 2023-12-22 NOTE — FLOWSHEET NOTE
Patient assessment complete. C/o 2/10 lower back pain. Up to chair at this time, states she isn't ready for bed yet. Chair alarm engaged with bedside table and call light within reach. Electronically signed by Kristine Vargas RN on 12/21/2023 at 9:34 PM    Patient standby assist with walker to bathroom, incontinent episode. New bed linen applied. Patient wanted to set up in chair afterwards. Morning medications given. Electronically signed by Kristine Vargas RN on 12/22/2023 at 5:55 AM    Patient on light to go back to bathroom to try and have a BM. Standby assist with walker to bathroom.  Electronically signed by Kristine Vargas RN on 12/22/2023 at 6:15 AM

## 2023-12-22 NOTE — PROGRESS NOTES
Factors: good sequencing noted with both ascending/descending - intermittently swithces between reciprocal and non-reciprocal  Curb  Curb Height: 6''  Device: Rolling walker  Number of Curbs: 4  Additional Factors: Set-up;Verbal cues;Non-reciprocal going up;Non-reciprocal going down;Increased time to complete  Assistance Level: Supervision  Skilled Clinical Factors: no vc's provided, demonstrates safety        PT Exercises  Exercise Treatment: // bars: march, fwd/retro, lateral stepping, fwd/retro with focus on heel strike during fwd and toe first during retro x4 laps each  Functional Mobility [employer] STS from chair without arms to focus on technique without armrests & from lower surface  Dynamic Standing Balance Exercises: toe taps on 6\" box x20 ronald  Motor Control/Coordination: gait drills: obstacle course around bolsters, onto 6\" curb step and blue foam x4 laps - focusing on efficient turns with ww      Timed Up and Go: 23  Activity Tolerance  Activity Tolerance: Patient tolerated treatment well       ASSESSMENT/PROGRESS TOWARDS GOALS:   Assessment  Assessment: Pt improved TUG score to 23s this session, close to meeting goal. Initiated STS from chair without arms per pt request to prepare her to stand from a lower couch at her mother's house, improved technique this date compared to prior sessions. Pt progressing toward all goals at this time.  Activity Tolerance: Patient tolerated treatment well    Goals:  Long Term Goals  Long Term Goal 1: Pt to complete bed mobility with indep  Long Term Goal 2: Pt to complete transfers with indep  Long Term Goal 3: Pt to ambulate 150ft with LRD and indep  Long Term Goal 4: Pt to manage 3 steps with HR and indep  Long Term Goal 5: Pt to complete HEP with indep  Additional Goals?: Yes  Long term goal 6: Pt to complete TUG within 20sec  Patient Goals   Patient Goals : I want to be able to go home and be independent.    PLAN OF CARE/Safety:   Safety Devices  Type of  Devices: All fall risk precautions in place; Chair alarm in place; Left in chair      Therapy Time:   Individual   Time In 930   Time Out 1030   Minutes 60      Minutes:60  Transfer/Bed mobility training:15  Gait trainin  Neuro re education: 25  Therapeutic ex: 0      Richard Martinez PTA, 23 at 12:42 PM

## 2023-12-22 NOTE — PROGRESS NOTES
OCCUPATIONAL THERAPY  INPATIENT REHAB TREATMENT NOTE  [facility]      NAME: Nancy Jean  : 1970 (53 y.o.)  MRN: [id number]  CODE STATUS: Full Code  Room: R247/R247-01    Date of Service: 2023    Referring Physician: Dr. 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 female presented with recurrent falls and inability to care for self. Recent history of electrolyte abnormalities. MRI of the brain showed results suggestive of osmotic demyelination syndrome otherwise no acute intracranial abnormality. 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      Restrictions  Restrictions/Precautions  Restrictions/Precautions: Fall Risk                 Patient's date of birth confirmed: Yes    SAFETY:  Safety Devices  Safety Devices in place: Yes  Type of devices: All fall risk precautions in place    SUBJECTIVE:       Pain at start of treatment: No    Pain at end of treatment: No      COGNITION:  Orientation  Overall Orientation Status: Within Normal Limits  Orientation Level: Oriented X4  Cognition  Overall Cognitive Status: WFL      Pt's current cognitive status is:  Comprehension: Independent  Expression: Independent  Social Interaction: Independent  Problem Solving: Supervision  Memory: Independent    OBJECTIVE:         Feeding  Equipment Provided: Feeding utensils  Assistance Level: Set-up  Grooming/Oral Hygiene  Assistance Level: Modified independent  Upper Extremity Bathing  Assistance Level: Modified independent  Lower Extremity Bathing  Assistance Level: Supervision  Upper Extremity Dressing  Assistance Level: Increased time to complete;Supervision  Lower Extremity Dressing  Assistance Level: Supervision  Putting On/Taking Off Footwear  Assistance Level: Minimal assistance  Skilled Clinical Factors: TD (A) to don TEDS over feet then pt finished pulling  them up. Pt donned socks/shoes with mod (I) seated  Toileting  Skilled Clinical Factors: pt declined the need to toilet  Toilet Transfers  Skilled Clinical Factors: pt declined  Tub/Shower Transfers  Type: Shower  Transfer From: Rolling walker  Transfer To: Shower chair with back  Additional Factors: Increased time to complete; With handrails  Assistance Level: Stand by assist;Verbal cues         Functional Mobility  Device: Rolling walker  Activity: To/From bathroom  Assistance Level: Stand by assist  Skilled Clinical Factors: vc's to  feet more and sequencing for stepping over threshold into bathroom with walker  Supine to Sit  Assistance Level: Modified independent  Scooting  Assistance Level: Stand by assist  Skilled Clinical Factors: difficulty side scooting on bed to the L did not side scoot to the R (only done for open space to stand to complete mobility to the bathroom)  Sit to Stand  Assistance Level: Supervision  Stand to Sit  Assistance Level: Supervision         Education:   Pt educated on walker to body placement with demo provided for visual education on stepping over uneven threshold into bathroom to decrease risk of falls and educated pt that the current reacher she has may be difficulty to use to get dressed with if needed for dressing purposes. Educated on buying the kind provided during training in therapy.          ASSESSMENT:  Assessment: Better balance this day, only noted balance issue standing from EOB d/t placing too much wt into ronald heels, education to place more wt through forefoot  Activity Tolerance: Patient tolerated treatment well      PLAN OF CARE:  Strengthening, Balance training, Functional mobility training, Endurance training, Safety education & training, Patient/Caregiver education & training, Equipment evaluation, education, & procurement, Neuromuscular re-education, Positioning, Home management training, Self-Care / ADL, Coordination training  Pt to continue OT POC

## 2023-12-22 NOTE — PROGRESS NOTES
[facility] Neurology Daily Progress Note  Name: Nancy Jean  Age: 53 y.o.  Gender: female  CodeStatus: Full Code  Allergies: Hydrochlorothiazide    Chief Complaint:No chief complaint on file.    Primary Care Provider: Dexter Mccian MD  InpatientTreatment Team: Treatment Team: Attending Provider: Chikis Cain DO; Consulting Physician: Nirmal Gimenez MD; Consulting Physician: Mathieu Olguin MD; Consulting Physician: Goldberg, Zev, PhD; Registered Nurse: Maira Porter RN; : Meilssa Bragg, MSW, LSW; Occupational Therapist: Quincy Cruz OT  Admission Date: 12/12/2023      HPI   Pt seen and examined on rehab for neurology follow-up.  Alert and oriented x 2, impulsive.  Severely bradykinetic.  Flat affect.  Rigidity noted.  Gait ataxia.  No reported hallucinations.  Sodium normal yesterday at 140.  No seizure activity reported.  Patient has shown improvement in gait and bradykinesia with Sinemet.  Denies lightheadedness or dizziness.  No mental status changes or hallucinations.  Hypotensive today at 98/75.  Will follow blood pressures    12/20  Events noted, weekly rehab rounds done     12/22  Patient reports response to carbidopa levodopa without side effects.  She is walking and ambulating better and she has better facial features.  She is considering that we should increase the medication which is reasonable.  Vitals:    12/22/23 0716   BP: 112/76   Pulse: 66   Resp: 17   Temp: 97.7 °F (36.5 °C)   SpO2: 100%        Review of Systems   Constitutional:  Negative for appetite change, chills, fatigue and fever.   HENT:  Negative for hearing loss and trouble swallowing.    Eyes:  Negative for visual disturbance.   Respiratory:  Negative for cough, chest tightness, shortness of breath and wheezing.    Cardiovascular:  Negative for chest pain, palpitations and leg swelling.   Gastrointestinal:  Negative for nausea and vomiting.   Musculoskeletal:  Positive for gait problem.   Skin:  Negative for  Patient reports no dizziness hallucinations or dyskinesias. Will start her on carbidopa levodopa 3 times instead of 2 times a day. Nirmal Eldridge MD, Telma Brooks, American Board of Psychiatry & Neurology  Board Certified in Vascular Neurology  Board Certified in Neuromuscular Medicine  Certified in Neurorehabilitation               Collaborating physicians: Dr Abena Eldridge    Electronically signed by Dani Lopez MD on 12/22/2023 at 1:50 PM

## 2023-12-22 NOTE — PROGRESS NOTES
Physical Therapy Rehab Treatment Note  Facility/Department: [facility]  Room: R2/R247-01       NAME: Joshua Kay  : 1970 (84 y.o.)  MRN: [id number]  CODE STATUS: Full Code    Date of Service: 2023       Restrictions:  Restrictions/Precautions: Fall Risk  Position Activity Restriction  Other position/activity restrictions: Pt can have thin liquids-sips. Pt cannot have mixed food/liquid such as cold cereal and milk, chicken noodle soup       SUBJECTIVE:   Subjective: \"I haven't gotten my sinemet yet\"    Pain  Pain: no pain reported at start or end of session. OBJECTIVE:       Bed Mobility  Additional Factors: Verbal cues; Increased time to complete; Head of bed flat; Without handrails  Roll Left  Assistance Level: Independent  Roll Right  Assistance Level: Independent  Sit to Supine  Assistance Level: Modified independent  Skilled Clinical Factors: increased time and effort, though improving  Supine to Sit  Assistance Level: Modified independent  Skilled Clinical Factors: increased time and effort, uses bed rail to complete  Scooting  Assistance Level: Independent  Skilled Clinical Factors: increased time and effort for completion    Transfers  Surface: To chair without arms;From chair without arms; Wheelchair;Standard toilet  Additional Factors: Increased time to complete  Device: Walker  Sit to Stand  Assistance Level: Modified independent  Skilled Clinical Factors: good carry over with hand placement, improving efficiency  Stand to Sit  Assistance Level: Supervision  Skilled Clinical Factors: improving carry over with safe approach to wc as well as controlling descent with BUEs  Bed To/From Chair  Technique: Stand pivot  Assistance Level: Modified independent  Skilled Clinical Factors: furniture walks, increased time but demonstrates safety    Ambulation  Surface: Level surface;Ramp;Carpet; Uneven surface  Device: Rolling walker  Distance: 225', 150'  Activity: Within Unit  Activity Comments:

## 2023-12-23 LAB
ANION GAP SERPL CALCULATED.3IONS-SCNC: 12 MEQ/L (ref 9–15)
BASOPHILS # BLD: 0 K/UL (ref 0–0.2)
BASOPHILS NFR BLD: 0.5 %
BUN SERPL-MCNC: 6 MG/DL (ref 6–20)
CALCIUM SERPL-MCNC: 9.6 MG/DL (ref 8.5–9.9)
CHLORIDE SERPL-SCNC: 103 MEQ/L (ref 95–107)
CO2 SERPL-SCNC: 26 MEQ/L (ref 20–31)
CREAT SERPL-MCNC: 0.51 MG/DL (ref 0.5–0.9)
EOSINOPHIL # BLD: 0.3 K/UL (ref 0–0.7)
EOSINOPHIL NFR BLD: 3.7 %
ERYTHROCYTE [DISTWIDTH] IN BLOOD BY AUTOMATED COUNT: 12 % (ref 11.5–14.5)
GLUCOSE SERPL-MCNC: 115 MG/DL (ref 70–99)
HCT VFR BLD AUTO: 37.6 % (ref 37–47)
HGB BLD-MCNC: 12.6 G/DL (ref 12–16)
LYMPHOCYTES # BLD: 1.6 K/UL (ref 1–4.8)
LYMPHOCYTES NFR BLD: 21.3 %
MCH RBC QN AUTO: 34 PG (ref 27–31.3)
MCHC RBC AUTO-ENTMCNC: 33.5 % (ref 33–37)
MCV RBC AUTO: 101.3 FL (ref 79.4–94.8)
MONOCYTES # BLD: 0.7 K/UL (ref 0.2–0.8)
MONOCYTES NFR BLD: 9.1 %
NEUTROPHILS # BLD: 4.9 K/UL (ref 1.4–6.5)
NEUTS SEG NFR BLD: 65 %
PLATELET # BLD AUTO: 351 K/UL (ref 130–400)
POTASSIUM SERPL-SCNC: 4.2 MEQ/L (ref 3.4–4.9)
RBC # BLD AUTO: 3.71 M/UL (ref 4.2–5.4)
SODIUM SERPL-SCNC: 141 MEQ/L (ref 135–144)
WBC # BLD AUTO: 7.6 K/UL (ref 4.8–10.8)

## 2023-12-23 PROCEDURE — 97116 GAIT TRAINING THERAPY: CPT

## 2023-12-23 PROCEDURE — 85025 COMPLETE CBC W/AUTO DIFF WBC: CPT

## 2023-12-23 PROCEDURE — 6370000000 HC RX 637 (ALT 250 FOR IP): Performed by: PHYSICAL MEDICINE & REHABILITATION

## 2023-12-23 PROCEDURE — 6370000000 HC RX 637 (ALT 250 FOR IP): Performed by: PSYCHIATRY & NEUROLOGY

## 2023-12-23 PROCEDURE — 1180000000 HC REHAB R&B

## 2023-12-23 PROCEDURE — 97535 SELF CARE MNGMENT TRAINING: CPT

## 2023-12-23 PROCEDURE — 6360000002 HC RX W HCPCS: Performed by: FAMILY MEDICINE

## 2023-12-23 PROCEDURE — 97530 THERAPEUTIC ACTIVITIES: CPT

## 2023-12-23 PROCEDURE — 97110 THERAPEUTIC EXERCISES: CPT

## 2023-12-23 PROCEDURE — 6370000000 HC RX 637 (ALT 250 FOR IP): Performed by: FAMILY MEDICINE

## 2023-12-23 PROCEDURE — 36415 COLL VENOUS BLD VENIPUNCTURE: CPT

## 2023-12-23 PROCEDURE — 6370000000 HC RX 637 (ALT 250 FOR IP): Performed by: NURSE PRACTITIONER

## 2023-12-23 PROCEDURE — 80048 BASIC METABOLIC PNL TOTAL CA: CPT

## 2023-12-23 RX ADMIN — ZOLPIDEM TARTRATE 5 MG: 5 TABLET ORAL at 20:27

## 2023-12-23 RX ADMIN — CARBIDOPA AND LEVODOPA 1 TABLET: 25; 100 TABLET ORAL at 14:18

## 2023-12-23 RX ADMIN — ENOXAPARIN SODIUM 40 MG: 100 INJECTION SUBCUTANEOUS at 09:40

## 2023-12-23 RX ADMIN — Medication 100 MCG: at 09:40

## 2023-12-23 RX ADMIN — Medication: at 06:38

## 2023-12-23 RX ADMIN — Medication 10 MG: at 19:37

## 2023-12-23 RX ADMIN — Medication: at 14:26

## 2023-12-23 RX ADMIN — SENNOSIDES 8.6 MG: 8.6 TABLET, FILM COATED ORAL at 19:37

## 2023-12-23 RX ADMIN — ATORVASTATIN CALCIUM 10 MG: 10 TABLET, FILM COATED ORAL at 19:37

## 2023-12-23 RX ADMIN — Medication 2000 UNITS: at 17:04

## 2023-12-23 RX ADMIN — OXYBUTYNIN CHLORIDE 5 MG: 5 TABLET, EXTENDED RELEASE ORAL at 09:40

## 2023-12-23 RX ADMIN — Medication: at 09:52

## 2023-12-23 RX ADMIN — Medication: at 19:34

## 2023-12-23 RX ADMIN — FAMOTIDINE 20 MG: 20 TABLET ORAL at 09:40

## 2023-12-23 RX ADMIN — PANTOPRAZOLE SODIUM 40 MG: 40 TABLET, DELAYED RELEASE ORAL at 06:38

## 2023-12-23 RX ADMIN — Medication: at 20:41

## 2023-12-23 RX ADMIN — FAMOTIDINE 20 MG: 20 TABLET ORAL at 19:37

## 2023-12-23 RX ADMIN — Medication 100 MG: at 09:40

## 2023-12-23 RX ADMIN — LOSARTAN POTASSIUM 25 MG: 25 TABLET, FILM COATED ORAL at 09:40

## 2023-12-23 RX ADMIN — CARBIDOPA AND LEVODOPA 1 TABLET: 25; 100 TABLET ORAL at 06:37

## 2023-12-23 RX ADMIN — CARBIDOPA AND LEVODOPA 1 TABLET: 25; 100 TABLET ORAL at 17:04

## 2023-12-23 NOTE — PROGRESS NOTES
Physical Therapy Rehab Treatment Note  Fac[facility]/Department: [facility]  [facility]       NAME: Diann Rivera  : 1970 (73 y.o.)  MRN: [id number]  CODE STATUS: Full Code    Date of Service: 2023     Restrictions:  Restrictions/Precautions: Fall Risk  Position Activity Restriction  Other position/activity restrictions: Pt can have thin liquids-sips. Pt cannot have mixed food/liquid such as cold cereal and milk, chicken noodle soup     SUBJECTIVE:   Subjective: \" I am doing alright\"    Pain  Pain: 2/10 mid back pain pre and post session medicated prior to session    OBJECTIVE:     Sit to Stand  Assistance Level: Modified independent;Supervision  Skilled Clinical Factors: Wendy from chair with arms. Supervision from chair without arms. Multiple attempts required from chair without arms  Stand to Sit  Assistance Level: Supervision  Skilled Clinical Factors: improving carry over with safe approach to wc as well as controlling descent with BUEs    Ambulation  Surface: Level surface; Uneven surface; Carpet  Device: Rolling walker  Distance: 150' z 2  Activity: Within Unit  Activity Comments: Increased shuffling pattern with negotiation of turns and over thresholds. Cues for increasing step length and promoting heel strike  Additional Factors: Verbal cues; Increased time to complete  Assistance Level: Supervision  Gait Deviations: Slow felix;Decreased step length bilateral;Decreased weight shift bilateral;Decreased heel strike right;Decreased heel strike left; Wide base of support    Stairs  Stair Height: 8''  Device: One handrail  Number of Stairs: 8  Additional Factors: Non-reciprocal going up;Reciprocal going down  Assistance Level: Supervision  Skilled Clinical Factors: good sequencing noted with both ascending/descending - intermittently switches between reciprocal and non-reciprocal    PT Exercises  Exercise Treatment: EOM exercises: Trunk extension over physioball, Chops and lifts with physioball, Forward flexion and reaching overhead with physioball. Good mornings x 10 each Seated B hs stretch 2 sets x 1 minute each     ASSESSMENT/PROGRESS TOWARDS GOALS:   Assessment  Assessment: Patient continues to require verbal and visual cues for heel strike and toe clearance with gait training. Multiple attempts required to stand from chair without arms and improved hand placement/ approach to chair post gait to sit  Activity Tolerance: Patient tolerated treatment well  Discharge Recommendations: Patient would benefit from continued therapy after discharge;Continue to assess pending progress    Goals:  Long Term Goals  Long Term Goal 1: Pt to complete bed mobility with indep  Long Term Goal 2: Pt to complete transfers with indep  Long Term Goal 3: Pt to ambulate 150ft with LRD and indep  Long Term Goal 4: Pt to manage 3 steps with HR and indep  Long Term Goal 5: Pt to complete HEP with indep  Additional Goals?: Yes  Long term goal 6: Pt to complete TUG within 20sec  Patient Goals   Patient Goals : I want to be able to go home and be independent.    PLAN OF CARE/Safety:   Safety Devices  Type of Devices: All fall risk precautions in place;Chair alarm in place    Therapy Time:   Individual   Time In 1000   Time Out 1100   Minutes 60      Minutes: 60  Transfer/Bed mobility training: 15  Gait trainin  Therapeutic ex: 20    Rian Ramirez PTA, 23 at 11:56 AM

## 2023-12-23 NOTE — PROGRESS NOTES
Physical Therapy Rehab Treatment Note  Facility/Department: [facility]  Room: R247/R247-01       NAME: Gilmar Jay  : 1970 (66 y.o.)  MRN: [id number]  CODE STATUS: Full Code    Date of Service: 2023       Restrictions:  Restrictions/Precautions: Fall Risk  Position Activity Restriction  Other position/activity restrictions: Pt can have thin liquids-sips. Pt cannot have mixed food/liquid such as cold cereal and milk, chicken noodle soup    SUBJECTIVE:   Subjective: \" I am doing alright\"    Pain  Pain: 2/10 mid back pain pre and post session medicated prior to session    OBJECTIVE:     Sit to Stand  Assistance Level: Modified independent;Supervision  Skilled Clinical Factors: Wendy from chair with arms. Supervision from chair without arms. Multiple attempts required from chair without arms  Stand to Sit  Assistance Level: Supervision  Skilled Clinical Factors: improving carry over with safe approach to wc as well as controlling descent with BUEs    Ambulation  Surface: Level surface; Uneven surface; Carpet  Device: Rolling walker  Distance: Multiple short distance gait trials to count steps  Activity: Within Unit  Activity Comments: Gait training focused on counting steps to pre determined distance with improved step length, felix and foot clearance. Gait trail with metronome with improved difficulty sequencing steps with beat at 70 BPM  Additional Factors: Verbal cues; Increased time to complete  Assistance Level: Supervision  Gait Deviations: Slow felix;Decreased step length bilateral;Decreased weight shift bilateral;Decreased heel strike right;Decreased heel strike left; Wide base of support    Stairs  Stair Height: 8''  Device: One handrail  Number of Stairs: 8  Additional Factors: Non-reciprocal going up;Reciprocal going down  Assistance Level: Supervision  Skilled Clinical Factors: good sequencing noted with both ascending/descending - intermittently switches between reciprocal and

## 2023-12-23 NOTE — PROGRESS NOTES
OCCUPATIONAL THERAPY  INPATIENT REHAB TREATMENT NOTE  [facility]      NAME: Roxanna Malone  : 1970 (52 y.o.)  MRN: [id number]  CODE STATUS: Full Code  Room: S685/I654-27    Date of Service: 2023    Referring Physician: Dr. 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 female presented with recurrent falls and inability to care for self. Recent history of electrolyte abnormalities. MRI of the brain showed results suggestive of osmotic demyelination syndrome otherwise no acute intracranial abnormality. 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      Restrictions  Restrictions/Precautions  Restrictions/Precautions: Fall Risk                 Patient's date of birth confirmed: Yes    SAFETY:  Safety Devices  Safety Devices in place: Yes  Type of devices: All fall risk precautions in place    SUBJECTIVE:       Pain at start of treatment: No    Pain at end of treatment: No    COGNITION:  Orientation  Overall Orientation Status: Within Normal Limits  Orientation Level: Oriented X4  Cognition  Overall Cognitive Status: WFL      OBJECTIVE:     Pt was finishing up in bathroom completing ADL  tasks with [facility]. Pt completed sit to stand: SBA, use of 2 w/w to complete mobility out of bathroom with SBA. Pivot turn into w/c: CGA for safety (touching assist). Pt completed peg board task with placing pennies on top of pegs post donning all pegs seated with Sup. Provided pt instruction completed task starting horizontally starting at the top of the board and working down the board in the same manner, switching out hands back and forth. Pt used 1# wrist wts to complete task.   Pt demonstrated min to no difficulty with entire task,  but did demo more difficulty with squeezing/finding the right amount of pressure for improving  between fingers on

## 2023-12-23 NOTE — FLOWSHEET NOTE
Patient alert, oriented and cooperative. Denies pain or any further complaints at this time. Call light within reach. Family at bedside.

## 2023-12-23 NOTE — PROGRESS NOTES
OCCUPATIONAL THERAPY  INPATIENT REHAB TREATMENT NOTE  [facility]      NAME: Dorys Peña  : 1970 (52 y.o.)  MRN: [id number]  CODE STATUS: Full Code  Room: Q666/N994-38    Date of Service: 2023    Referring Physician: Dr. 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 female presented with recurrent falls and inability to care for self. Recent history of electrolyte abnormalities. MRI of the brain showed results suggestive of osmotic demyelination syndrome otherwise no acute intracranial abnormality. 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      Restrictions  Restrictions/Precautions  Restrictions/Precautions: Fall Risk     Patient's date of birth confirmed: Yes    SAFETY:  Safety Devices  Safety Devices in place: Yes  Type of devices: All fall risk precautions in place    SUBJECTIVE:    Pain  Pain at start of treatment: No    Pain at end of treatment: No      OBJECTIVE:      Grooming/Oral Hygiene  Assistance Level: Stand by assist  Skilled Clinical Factors: Pt completed hand hygiene while standing at sink  Toileting  Assistance Level: Stand by assist    Toilet Transfers  Technique: Stand step  Equipment: Grab bars;Standard toilet  Additional Factors: With handrails  Assistance Level: Stand by assist  Skilled Clinical Factors: FWW    Functional Mobility:  Device: FWW  Activity: To/From Bathroom  Assistance Level: SBA       Blocks and Bolts: Pt used bilateral hands to assemble/disassemble a pattern using colored blocks, threaded rods, and wing nuts. Pt was able to replicate a medium complexity pattern with Min difficulty and increased time. Pt able to problem solve Independently. Pt had Min difficulty with hand fine motor coordination challenges of activity, however, did not drop any pieces.  Pt completed activity to improve hand fine motor

## 2023-12-24 PROCEDURE — 97535 SELF CARE MNGMENT TRAINING: CPT

## 2023-12-24 PROCEDURE — 6370000000 HC RX 637 (ALT 250 FOR IP): Performed by: FAMILY MEDICINE

## 2023-12-24 PROCEDURE — 1180000000 HC REHAB R&B

## 2023-12-24 PROCEDURE — 6370000000 HC RX 637 (ALT 250 FOR IP): Performed by: NURSE PRACTITIONER

## 2023-12-24 PROCEDURE — 6370000000 HC RX 637 (ALT 250 FOR IP): Performed by: PSYCHIATRY & NEUROLOGY

## 2023-12-24 PROCEDURE — 97116 GAIT TRAINING THERAPY: CPT

## 2023-12-24 PROCEDURE — 6370000000 HC RX 637 (ALT 250 FOR IP): Performed by: PHYSICAL MEDICINE & REHABILITATION

## 2023-12-24 PROCEDURE — 6360000002 HC RX W HCPCS: Performed by: FAMILY MEDICINE

## 2023-12-24 RX ADMIN — LOSARTAN POTASSIUM 25 MG: 25 TABLET, FILM COATED ORAL at 11:07

## 2023-12-24 RX ADMIN — Medication 10 MG: at 19:59

## 2023-12-24 RX ADMIN — FAMOTIDINE 20 MG: 20 TABLET ORAL at 11:07

## 2023-12-24 RX ADMIN — ZOLPIDEM TARTRATE 5 MG: 5 TABLET ORAL at 21:21

## 2023-12-24 RX ADMIN — Medication 100 MCG: at 11:07

## 2023-12-24 RX ADMIN — OXYBUTYNIN CHLORIDE 5 MG: 5 TABLET, EXTENDED RELEASE ORAL at 11:07

## 2023-12-24 RX ADMIN — ATORVASTATIN CALCIUM 10 MG: 10 TABLET, FILM COATED ORAL at 19:59

## 2023-12-24 RX ADMIN — Medication 2000 UNITS: at 17:30

## 2023-12-24 RX ADMIN — CARBIDOPA AND LEVODOPA 1 TABLET: 25; 100 TABLET ORAL at 12:12

## 2023-12-24 RX ADMIN — Medication: at 19:56

## 2023-12-24 RX ADMIN — Medication 100 MG: at 11:07

## 2023-12-24 RX ADMIN — Medication: at 14:00

## 2023-12-24 RX ADMIN — PANTOPRAZOLE SODIUM 40 MG: 40 TABLET, DELAYED RELEASE ORAL at 05:45

## 2023-12-24 RX ADMIN — Medication: at 21:09

## 2023-12-24 RX ADMIN — ENOXAPARIN SODIUM 40 MG: 100 INJECTION SUBCUTANEOUS at 11:07

## 2023-12-24 RX ADMIN — FAMOTIDINE 20 MG: 20 TABLET ORAL at 19:59

## 2023-12-24 RX ADMIN — SENNOSIDES 8.6 MG: 8.6 TABLET, FILM COATED ORAL at 19:59

## 2023-12-24 RX ADMIN — CARBIDOPA AND LEVODOPA 1 TABLET: 25; 100 TABLET ORAL at 17:30

## 2023-12-24 RX ADMIN — CARBIDOPA AND LEVODOPA 1 TABLET: 25; 100 TABLET ORAL at 05:45

## 2023-12-24 RX ADMIN — Medication: at 11:08

## 2023-12-24 ASSESSMENT — PAIN SCALES - GENERAL: PAINLEVEL_OUTOF10: 0

## 2023-12-24 NOTE — PROGRESS NOTES
Physical Therapy Rehab Treatment Note  Facility/Department[facility]  [facility]       NAME: Nancy Jean  : 1970 (53 y.o.)  MRN: [id number]  CODE STATUS: Full Code    Date of Service: 2023       Restrictions:  Restrictions/Precautions: Fall Risk  Position Activity Restriction  Other position/activity restrictions: Pt can have thin liquids-sips. Pt cannot have mixed food/liquid such as cold cereal and milk, chicken noodle soup       SUBJECTIVE:   Subjective: Pt reports she's \"a little nervous\" to go home tomorrow with her mom on a day pass.    Pain  Pain: no pain reported at start or end of session.      OBJECTIVE:            Transfers  Surface: To chair without arms;From chair without arms;Wheelchair  Additional Factors: Increased time to complete  Device: Walker  Sit to Stand  Assistance Level: Modified independent;Supervision  Skilled Clinical Factors: Wendy from chair with arms. Supervision from chair without arms. Multiple attempts required from chair without arms  Stand to Sit  Assistance Level: Supervision  Skilled Clinical Factors: improving carry over with safe approach to wc as well as controlling descent with BUEs    Ambulation  Surface: Level surface  Device: Rolling walker  Distance: 225', 125'  Activity: Within Unit  Activity Comments: Increased shuffling pattern with negotiation of turns and over thresholds. Cues for increasing step length and promoting heel strike  Additional Factors: Verbal cues;Increased time to complete  Assistance Level: Supervision  Gait Deviations: Slow felix;Decreased step length bilateral;Decreased weight shift bilateral;Decreased heel strike right;Decreased heel strike left;Wide base of support  Skilled Clinical Factors: improving carry over with step length and height. supervision for safety and occasional cuing for ronald foot clearance    Stairs  Stair Height: 8''  Device: One handrail  Number of Stairs: 8  Additional Factors: Non-reciprocal going  up;Non-reciprocal going down  Assistance Level: Supervision  Skilled Clinical Factors: good sequencing noted with both ascending/descending - intermittently switches between reciprocal and non-reciprocal  Curb  Curb Height: 6''  Device: Rolling walker  Number of Curbs: 4  Additional Factors: Set-up; Verbal cues; Non-reciprocal going up;Non-reciprocal going down; Increased time to complete  Assistance Level: Supervision  Skilled Clinical Factors: no vc's provided, demonstrates safety        PT Exercises  Motor Control/Coordination: gait drills: obstacle course around bolsters x4 laps - improving gait quality with turns      Activity Tolerance  Activity Tolerance: Patient tolerated treatment well       ASSESSMENT/PROGRESS TOWARDS GOALS:   Assessment  Assessment: Treatment focused on improving gait quality with turn negotiation and over thresholds, decreased shuffling noted this session. Pt planning to use day pass and visit her mother's home tomorrow, reviewed stairs, curb step, and STS from chair without arms to simulate home set up. Activity Tolerance: Patient tolerated treatment well    Goals:  Long Term Goals  Long Term Goal 1: Pt to complete bed mobility with indep  Long Term Goal 2: Pt to complete transfers with indep  Long Term Goal 3: Pt to ambulate 150ft with LRD and indep  Long Term Goal 4: Pt to manage 3 steps with HR and indep  Long Term Goal 5: Pt to complete HEP with indep  Additional Goals?: Yes  Long term goal 6: Pt to complete TUG within 20sec  Patient Goals   Patient Goals : I want to be able to go home and be independent. PLAN OF CARE/Safety:   Safety Devices  Type of Devices: All fall risk precautions in place;Call light within reach; Chair alarm in place; Left in chair      Therapy Time:   Individual   Time In 0900   Time Out 0930   Minutes 30      Minutes:30  Transfer/Bed mobility training: 10  Gait training: 15  Neuro re education: 5  Therapeutic ex:0      Xiang Reis PTA, 12/24/23 at

## 2023-12-24 NOTE — PROGRESS NOTES
OCCUPATIONAL THERAPY  INPATIENT REHAB TREATMENT NOTE  [facility]      NAME: David Vazquez  : 1970 (52 y.o.)  MRN: [id number]  CODE STATUS: Full Code  Room: L306/V552-39    Date of Service: 2023    Referring Physician: Dr. 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 female presented with recurrent falls and inability to care for self. Recent history of electrolyte abnormalities. MRI of the brain showed results suggestive of osmotic demyelination syndrome otherwise no acute intracranial abnormality. 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      Restrictions  Restrictions/Precautions  Restrictions/Precautions: Fall Risk                 Patient's date of birth confirmed: Yes    SAFETY:  Safety Devices  Safety Devices in place: Yes  Type of devices: All fall risk precautions in place    SUBJECTIVE:       Pain at start of treatment: No    Pain at end of treatment: No    COGNITION:  Orientation  Overall Orientation Status: Within Normal Limits  Orientation Level: Oriented X4  Cognition  Overall Cognitive Status: WFL      Pt's current cognitive status is:  Comprehension: Mod I  Expression: Supervision  Social Interaction: Supervision  Problem Solving: Supervision  Memory:  Mod I    OBJECTIVE:         Grooming/Oral Hygiene  Assistance Level: Stand by assist  Skilled Clinical Factors: Pt completed hand hygiene while seated at sink  Upper Extremity Bathing  Assistance Level: Stand by assist  Lower Extremity Bathing  Assistance Level: Stand by assist  Skilled Clinical Factors: use of grab bars while standing and use of long armed sponge  Upper Extremity Dressing  Assistance Level: Set-up  Skilled Clinical Factors: assist to retrieve clothing from closet, pt able to don with SBA  Lower Extremity Dressing  Assistance Level: Stand by assist;Verbal gait ad SBA  Activity Tolerance: Patient tolerated evaluation without incident;Patient tolerated treatment well      PLAN OF CARE:  Strengthening, Balance training, Functional mobility training, Endurance training, Safety education & training, Patient/Caregiver education & training, Equipment evaluation, education, & procurement, Neuromuscular re-education, Positioning, Home management training, Self-Care / ADL, Coordination training  Pt to continue OT POC     Patient goals : \"get my strength and dexterity back\"  Time Frame for Long Term Goals : Within 2 weeks, pt to demonstrate progress in the following areas below to achieve specific LTGs as stated in the initial evaluation  Long Term Goal 1: Increase functional endurance  Long Term Goal 2: increase strength  Long Term Goal 3: increase ADL status        Therapy Time:   Individual Group Co-Treat   Time In 1000       Time Out 1100         Minutes 60                   ADL/IADL trainin minutes  Therapeutic activities: 20 minutes     Electronically signed by:    BRUCE Valentin,   2023, 10:56 AM

## 2023-12-24 NOTE — PROGRESS NOTES
Followup    Patient was seen in her room this morning; she was seated in her wheelchair. Her affect was somewhat anxious and she was somewhat guarded in her responses. She said that she had improved over the past week. She was glad to be going on the pass to her mother's home on Danni Day, and she said that she planned to go to her home (a house about six miles from her mom's home) to look it over. She plans to stay with her mom when she is discharged next week. She has been using the walker, and  she was not sure how much assistance she would need. She said that she was hoping to return to driving. I asked her about her prior treatment for anxiety, and she responded, \"For alcohol\". She said that the alcohol \"covered up\" her anxiety. She said that she planned to get involved in treatment for \"alcohol\", and had been going \"to meetings\" in the past.     Jo Mark Anthony was not forthcoming about her prior treatment for anxiety and alcohol abuse. Her recent history with respect to her using alcohol and other substances is unclear to me, but may well have contributed to her neurological problems. I would recommend emphasizing to her mom that alcohol should not be available in her home when Zahra Alvarez is discharged. PT/OT should speak with her regarding whether it would be safe for her to resume driving.

## 2023-12-25 PROCEDURE — 6370000000 HC RX 637 (ALT 250 FOR IP): Performed by: PHYSICAL MEDICINE & REHABILITATION

## 2023-12-25 PROCEDURE — 6360000002 HC RX W HCPCS: Performed by: FAMILY MEDICINE

## 2023-12-25 PROCEDURE — 6370000000 HC RX 637 (ALT 250 FOR IP): Performed by: NURSE PRACTITIONER

## 2023-12-25 PROCEDURE — 1180000000 HC REHAB R&B

## 2023-12-25 PROCEDURE — 6360000002 HC RX W HCPCS: Performed by: PHYSICAL MEDICINE & REHABILITATION

## 2023-12-25 PROCEDURE — 6370000000 HC RX 637 (ALT 250 FOR IP): Performed by: FAMILY MEDICINE

## 2023-12-25 PROCEDURE — 6370000000 HC RX 637 (ALT 250 FOR IP): Performed by: PSYCHIATRY & NEUROLOGY

## 2023-12-25 RX ADMIN — ZOLPIDEM TARTRATE 5 MG: 5 TABLET ORAL at 20:59

## 2023-12-25 RX ADMIN — CARBIDOPA AND LEVODOPA 1 TABLET: 25; 100 TABLET ORAL at 05:12

## 2023-12-25 RX ADMIN — Medication 100 MCG: at 08:48

## 2023-12-25 RX ADMIN — ACETAMINOPHEN 325 MG: 325 TABLET ORAL at 20:59

## 2023-12-25 RX ADMIN — Medication 10 MG: at 20:55

## 2023-12-25 RX ADMIN — Medication 100 MG: at 08:48

## 2023-12-25 RX ADMIN — Medication: at 09:59

## 2023-12-25 RX ADMIN — Medication: at 05:09

## 2023-12-25 RX ADMIN — CYANOCOBALAMIN 1000 MCG: 1000 INJECTION, SOLUTION INTRAMUSCULAR; SUBCUTANEOUS at 08:48

## 2023-12-25 RX ADMIN — ATORVASTATIN CALCIUM 10 MG: 10 TABLET, FILM COATED ORAL at 20:55

## 2023-12-25 RX ADMIN — OXYBUTYNIN CHLORIDE 5 MG: 5 TABLET, EXTENDED RELEASE ORAL at 08:48

## 2023-12-25 RX ADMIN — ENOXAPARIN SODIUM 40 MG: 100 INJECTION SUBCUTANEOUS at 08:49

## 2023-12-25 RX ADMIN — FAMOTIDINE 20 MG: 20 TABLET ORAL at 20:55

## 2023-12-25 RX ADMIN — Medication: at 20:55

## 2023-12-25 RX ADMIN — FAMOTIDINE 20 MG: 20 TABLET ORAL at 08:48

## 2023-12-25 RX ADMIN — PANTOPRAZOLE SODIUM 40 MG: 40 TABLET, DELAYED RELEASE ORAL at 05:12

## 2023-12-25 RX ADMIN — SENNOSIDES 8.6 MG: 8.6 TABLET, FILM COATED ORAL at 20:55

## 2023-12-25 RX ADMIN — Medication: at 20:56

## 2023-12-25 RX ADMIN — LOSARTAN POTASSIUM 25 MG: 25 TABLET, FILM COATED ORAL at 08:48

## 2023-12-25 RX ADMIN — Medication 2000 UNITS: at 18:15

## 2023-12-25 ASSESSMENT — PAIN SCALES - GENERAL
PAINLEVEL_OUTOF10: 3
PAINLEVEL_OUTOF10: 4

## 2023-12-25 ASSESSMENT — PAIN DESCRIPTION - ORIENTATION: ORIENTATION: LEFT;LOWER

## 2023-12-25 ASSESSMENT — PAIN DESCRIPTION - DESCRIPTORS: DESCRIPTORS: DULL;THROBBING

## 2023-12-25 ASSESSMENT — PAIN DESCRIPTION - LOCATION: LOCATION: BACK

## 2023-12-25 NOTE — PROGRESS NOTES
Father     Diabetes Father     Cancer Maternal Grandmother     Cancer Paternal Grandmother     Colon Cancer Neg Hx        Physical Examination      Vitals:  /78   Pulse 88   Temp 98 °F (36.7 °C)   Resp 18   Ht 1.727 m (5' 7.99\")   Wt 74.2 kg (163 lb 9.6 oz)   LMP 2007 (Exact Date)   SpO2 98%   BMI 24.88 kg/m²   Temp (24hrs), Av °F (36.7 °C), Min:97.8 °F (36.6 °C), Max:98.2 °F (36.8 °C)      I/O (24Hr):  No intake or output data in the 24 hours ending 23 1035    CONSTITUTIONAL:  awake, alert, cooperative, no apparent distress, ill-appearing, and appears stated age  EYES:  sclera clear  ENT:  normocepalic, without obvious abnormality  NECK:  supple, symmetrical, trachea midline  BACK:  symmetric  LUNGS:  No increased work of breathing, good air exchange, clear to auscultation bilaterally, no crackles or wheezing  CARDIOVASCULAR:  regular rate and rhythm, normal S1 and S2,  and no murmur noted  ABDOMEN:  normal bowel sounds, non-distended, and non-tender  MUSCULOSKELETAL:  there is no redness, warmth, or swelling of the joints.   NEUROLOGIC:  Awake, alert, oriented to name, place and time.  Positive for weakness.  SKIN:  normal skin color, texture, turgor and no redness, warmth, or swelling    Labs/Imaging/Diagnostics   Labs:  CBC:  Recent Labs     23  0641   WBC 7.6   RBC 3.71*   HGB 12.6   HCT 37.6   .3*   RDW 12.0          CHEMISTRIES:  Recent Labs     23  0641      K 4.2      CO2 26   BUN 6   CREATININE 0.51   GLUCOSE 115*       PT/INR:No results for input(s): \"PROTIME\", \"INR\" in the last 72 hours.  APTT:No results for input(s): \"APTT\" in the last 72 hours.  LIVER PROFILE:No results for input(s): \"AST\", \"ALT\", \"BILIDIR\", \"BILITOT\", \"ALKPHOS\" in the last 72 hours.    Imaging Last 24 Hours:  Fluoroscopy modified barium swallow with video    Result Date: 2023  EXAMINATION: MODIFIED BARIUM SWALLOW WAS PERFORMED IN CONJUNCTION WITH SPEECH  PATHOLOGY SERVICES TECHNIQUE: Under fluoroscopic evaluation cineradiography/videoradiography recordings were performed in conjunction with the speech-language pathologist (SLP). Various liquid, solid and/or semi-solid barium preparations were used to assess swallowing function. FLUOROSCOPY DOSE AND TYPE: Radiation Exposure Index: DAP uGy*m2, 330 COMPARISON: None HISTORY: ORDERING SYSTEM PROVIDED HISTORY: Dysphagia TECHNOLOGIST PROVIDED HISTORY: Reason for exam:->Dysphagia What reading provider will be dictating this exam?->CRC FINDINGS: Barium of varying consistencies was administered orally     Please see separate speech pathology report for full discussion of findings and recommendations.        Electronically signed by PAUL Smith CNP on 12/25/23 at 10:36 AM EST

## 2023-12-25 NOTE — FLOWSHEET NOTE
Patient return from [place]. Acknowledged that she took her sinemet as ordered. Assessment completed, see flowsheet. Denies pain or any further needs at this time. Call light within reach.

## 2023-12-25 NOTE — PROGRESS NOTES
Patient alert and oriented up ambulated to bathroom with walker safety reinforced. Appetite good taking po fluids well. CPAP  on for the night .

## 2023-12-26 LAB
ANION GAP SERPL CALCULATED.3IONS-SCNC: 12 MEQ/L (ref 9–15)
BASOPHILS # BLD: 0 K/UL (ref 0–0.2)
BASOPHILS NFR BLD: 0.4 %
BUN SERPL-MCNC: 7 MG/DL (ref 6–20)
CALCIUM SERPL-MCNC: 9.4 MG/DL (ref 8.5–9.9)
CHLORIDE SERPL-SCNC: 104 MEQ/L (ref 95–107)
CO2 SERPL-SCNC: 26 MEQ/L (ref 20–31)
CREAT SERPL-MCNC: 0.57 MG/DL (ref 0.5–0.9)
EOSINOPHIL # BLD: 0.2 K/UL (ref 0–0.7)
EOSINOPHIL NFR BLD: 2.8 %
ERYTHROCYTE [DISTWIDTH] IN BLOOD BY AUTOMATED COUNT: 12 % (ref 11.5–14.5)
GLUCOSE SERPL-MCNC: 109 MG/DL (ref 70–99)
HCT VFR BLD AUTO: 33.6 % (ref 37–47)
HGB BLD-MCNC: 11.3 G/DL (ref 12–16)
LYMPHOCYTES # BLD: 1.5 K/UL (ref 1–4.8)
LYMPHOCYTES NFR BLD: 20.5 %
MCH RBC QN AUTO: 34 PG (ref 27–31.3)
MCHC RBC AUTO-ENTMCNC: 33.6 % (ref 33–37)
MCV RBC AUTO: 101.2 FL (ref 79.4–94.8)
MONOCYTES # BLD: 0.7 K/UL (ref 0.2–0.8)
MONOCYTES NFR BLD: 9.9 %
NEUTROPHILS # BLD: 4.8 K/UL (ref 1.4–6.5)
NEUTS SEG NFR BLD: 65.8 %
PLATELET # BLD AUTO: 276 K/UL (ref 130–400)
POTASSIUM SERPL-SCNC: 3.9 MEQ/L (ref 3.4–4.9)
RBC # BLD AUTO: 3.32 M/UL (ref 4.2–5.4)
SODIUM SERPL-SCNC: 142 MEQ/L (ref 135–144)
WBC # BLD AUTO: 7.3 K/UL (ref 4.8–10.8)

## 2023-12-26 PROCEDURE — 6370000000 HC RX 637 (ALT 250 FOR IP): Performed by: PHYSICAL MEDICINE & REHABILITATION

## 2023-12-26 PROCEDURE — 97535 SELF CARE MNGMENT TRAINING: CPT

## 2023-12-26 PROCEDURE — 92526 ORAL FUNCTION THERAPY: CPT

## 2023-12-26 PROCEDURE — 92507 TX SP LANG VOICE COMM INDIV: CPT

## 2023-12-26 PROCEDURE — 97112 NEUROMUSCULAR REEDUCATION: CPT

## 2023-12-26 PROCEDURE — 36415 COLL VENOUS BLD VENIPUNCTURE: CPT

## 2023-12-26 PROCEDURE — 6370000000 HC RX 637 (ALT 250 FOR IP): Performed by: NURSE PRACTITIONER

## 2023-12-26 PROCEDURE — 99232 SBSQ HOSP IP/OBS MODERATE 35: CPT | Performed by: PHYSICAL MEDICINE & REHABILITATION

## 2023-12-26 PROCEDURE — 97116 GAIT TRAINING THERAPY: CPT

## 2023-12-26 PROCEDURE — 85025 COMPLETE CBC W/AUTO DIFF WBC: CPT

## 2023-12-26 PROCEDURE — 1180000000 HC REHAB R&B

## 2023-12-26 PROCEDURE — 80048 BASIC METABOLIC PNL TOTAL CA: CPT

## 2023-12-26 PROCEDURE — 6370000000 HC RX 637 (ALT 250 FOR IP): Performed by: FAMILY MEDICINE

## 2023-12-26 PROCEDURE — 99231 SBSQ HOSP IP/OBS SF/LOW 25: CPT | Performed by: NURSE PRACTITIONER

## 2023-12-26 PROCEDURE — 6360000002 HC RX W HCPCS: Performed by: FAMILY MEDICINE

## 2023-12-26 PROCEDURE — 6370000000 HC RX 637 (ALT 250 FOR IP): Performed by: PSYCHIATRY & NEUROLOGY

## 2023-12-26 RX ADMIN — FAMOTIDINE 20 MG: 20 TABLET ORAL at 08:53

## 2023-12-26 RX ADMIN — Medication 10 MG: at 21:22

## 2023-12-26 RX ADMIN — ACETAMINOPHEN 325 MG: 325 TABLET ORAL at 21:21

## 2023-12-26 RX ADMIN — Medication: at 21:22

## 2023-12-26 RX ADMIN — ENOXAPARIN SODIUM 40 MG: 100 INJECTION SUBCUTANEOUS at 08:52

## 2023-12-26 RX ADMIN — OXYBUTYNIN CHLORIDE 5 MG: 5 TABLET, EXTENDED RELEASE ORAL at 08:52

## 2023-12-26 RX ADMIN — CARBIDOPA AND LEVODOPA 1 TABLET: 25; 100 TABLET ORAL at 05:55

## 2023-12-26 RX ADMIN — FAMOTIDINE 20 MG: 20 TABLET ORAL at 21:22

## 2023-12-26 RX ADMIN — CARBIDOPA AND LEVODOPA 1 TABLET: 25; 100 TABLET ORAL at 17:19

## 2023-12-26 RX ADMIN — Medication: at 08:53

## 2023-12-26 RX ADMIN — Medication 100 MG: at 08:53

## 2023-12-26 RX ADMIN — PANTOPRAZOLE SODIUM 40 MG: 40 TABLET, DELAYED RELEASE ORAL at 08:52

## 2023-12-26 RX ADMIN — Medication 2000 UNITS: at 17:19

## 2023-12-26 RX ADMIN — ATORVASTATIN CALCIUM 10 MG: 10 TABLET, FILM COATED ORAL at 21:22

## 2023-12-26 RX ADMIN — CARBIDOPA AND LEVODOPA 1 TABLET: 25; 100 TABLET ORAL at 13:57

## 2023-12-26 RX ADMIN — ZOLPIDEM TARTRATE 5 MG: 5 TABLET ORAL at 21:22

## 2023-12-26 RX ADMIN — Medication 100 MCG: at 08:52

## 2023-12-26 RX ADMIN — SENNOSIDES 8.6 MG: 8.6 TABLET, FILM COATED ORAL at 21:22

## 2023-12-26 ASSESSMENT — PAIN SCALES - GENERAL
PAINLEVEL_OUTOF10: 2
PAINLEVEL_OUTOF10: 0

## 2023-12-26 ASSESSMENT — PAIN DESCRIPTION - LOCATION: LOCATION: BUTTOCKS

## 2023-12-26 ASSESSMENT — PAIN DESCRIPTION - DESCRIPTORS: DESCRIPTORS: ACHING;DISCOMFORT

## 2023-12-26 ASSESSMENT — PAIN DESCRIPTION - ORIENTATION: ORIENTATION: RIGHT;LEFT

## 2023-12-26 NOTE — PROGRESS NOTES
Subjective:  The patient complains of severe acute on chronic progressive fatigue and  gait ataxia and quadriparesis partially relieved by rest, medications, PT,  OT,   SLP and rest and exacerbated by recent illness.   Patient presented through the emergency room with falls and dizziness.  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  A CTA of the neck showed noncalcified plaque with posterior wall ulceration of the origin of the right ICA without stenosis.     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  It was felt to be an aftermath of patient's underlying alcoholism and hyponatremia with a sodium as low as 105 with correction.  It was felt that nutritional demyelination was also likely comorbidity.  Oropharyngeal dysphagia was monitored for period    I am concerned about patient’s medical complexities and barriers to advancing in rehab goals including  complex medical and social issues.      She is trialing Sinemet for parkinsonian syndrome which seems to be helping     I reviewed current care and plans for further care with other rehab providers including nursing and case management.  According to recent   note, \"  C/o 2/10 lower back pain. Up to chair at this time, states she isn't ready for bed yet. Chair alarm engaged with bedside table and call light within reach.    Patient standby assist with walker to bathroom, incontinent episode. New bed linen applied. Patient wanted to set up in chair afterwards. Morning medications given.    Patient on light to go back to bathroom to try and have a BM. Standby assist with walker to bathroom.\".    Arminda Phan RN  Registered Nurse  Nursing     Flowsheet Note      Signed     Date of Service: 12/25/2023  5:50 PM     Signed         Patient return from [place].  family physician after discharge. Complex Active General Medical Issues that complicate care Assess & Plan:      Anxiety-emotional support provided daily, vitamin B12, encourage participation in rehabilitation support group and recreational therapy, adjust/add medications. Hyperlipidemia,  Hypertension-Acute rehab to monitor heart rate and rhythm with the option of telemetry and the effects of chronotropic medication with respect to increasing physical activity and exercise in PT, OT, ADLs with medication titration to lowest effective dosing. Continue blood signs every shift focusing on heart rate, rhythm and blood pressure checks with orthostatic checks-monitoring the effect of exercise, therapy and posture. Consult hospitalist for backup medical and adjust/add medications. Monitor heart rate and blood pressure as well as medications effects on vital signs before during and after therapy with especial focus on preventing orthostasis and falls risk. Sleep apnea-monitor overnight pulse ox provide supplemental O2 as needed    Hyponatremia-avoid excessive fluids avoid alcohol monitor sodium level  Parkinsonian gait with recurrent falls,   Ataxic gait-focus on balance and therapy-neurology consult in titrating Sinemet    Current smoker-NicoDerm patch    Chronic pain-topicals and lowest effective dose of pain medication    Overactive bladder-check postvoid residuals    Vitamin D deficiency-  high-dose vitamin D, recheck vitamin D level out after discharge    Osmotic demyelination syndrome,   Central pontine myelinolysis     Alcohol abuse-avoid alcohol use provide emotional support encouraged12-step program  Sore throat-check for strep treat symptomatically. Focus on emotional health and caregiver involvement in care.      GFR above 60  , hct 37  Day pass went well    Jamie Martins D.O., PM&R     Attending    [address] [place] [address]

## 2023-12-26 NOTE — PLAN OF CARE
Problem: Pain  Goal: Verbalizes/displays adequate comfort level or baseline comfort level  Outcome: Progressing   Patient denies pain at this moment

## 2023-12-26 NOTE — PROGRESS NOTES
OCCUPATIONAL THERAPY  INPATIENT REHAB TREATMENT NOTE  [facility]      NAME: Erna Sutton  : 1970 (52 y.o.)  MRN: [id number]  CODE STATUS: Full Code  Room: W352/N578-03    Date of Service: 2023    Referring Physician: Dr. 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 female presented with recurrent falls and inability to care for self. Recent history of electrolyte abnormalities. MRI of the brain showed results suggestive of osmotic demyelination syndrome otherwise no acute intracranial abnormality. 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      Restrictions  Restrictions/Precautions  Restrictions/Precautions: Fall Risk                 Patient's date of birth confirmed: Yes    SAFETY:  Safety Devices  Safety Devices in place: Yes  Type of devices: All fall risk precautions in place    SUBJECTIVE:       Pain at start of treatment: No    Pain at end of treatment: No      COGNITION:  Orientation  Overall Orientation Status: Within Normal Limits  Orientation Level: Oriented X4  Cognition  Overall Cognitive Status: WFL      Pt's current cognitive status is:  Comprehension: Independent  Expression: Independent  Social Interaction: Independent  Problem Solving: Supervision  Memory: Mod I    OBJECTIVE:         Feeding  Assistance Level: Set-up  Grooming/Oral Hygiene  Assistance Level: Supervision  Upper Extremity Bathing  Assistance Level: Modified independent  Lower Extremity Bathing  Assistance Level: Supervision  Upper Extremity Dressing  Assistance Level: Modified independent  Lower Extremity Dressing  Assistance Level: Supervision  Putting On/Taking Off Footwear  Assistance Level: Modified independent  Toileting  Assistance Level: Supervision  Toilet Transfers  Technique: Stand step  Equipment: Grab bars;Standard toilet  Additional Factors:  With

## 2023-12-26 NOTE — PROGRESS NOTES
Physical Therapy Rehab Treatment Note  Facility/Department: [facility]  Room: R2/R247-01       NAME: Nancy Jean  : 1970 (53 y.o.)  MRN: [id number]  CODE STATUS: Full Code    Date of Service: 2023       Restrictions:  Restrictions/Precautions: Fall Risk  Position Activity Restriction  Other position/activity restrictions: Pt can have thin liquids-sips. Pt cannot have mixed food/liquid such as cold cereal and milk, chicken noodle soup       SUBJECTIVE:   Subjective: Pt reports her [place] was \"okay\", and that she had a good time with her family.    Pain  Pain: no pain reported at start or end of session.      OBJECTIVE:            Transfers  Surface: To chair without arms;From chair without arms;Wheelchair  Additional Factors: Increased time to complete  Device: Walker  Sit to Stand  Assistance Level: Modified independent  Skilled Clinical Factors: improved ability to stand from chair without arms. good technique and efficiency  Stand to Sit  Assistance Level: Supervision  Skilled Clinical Factors: improved approach to chair, less shuffling noted. controls descent with BUEs    Ambulation  Surface: Level surface;Uneven surface;Carpet;Ramp  Device: Rolling walker  Distance: 225', 125', 2 x40', 200'  Activity: Within Unit (hallways)  Activity Comments: Increased shuffling pattern with negotiation of turns and over thresholds. Cues for increasing step length and promoting heel strike  Additional Factors: Verbal cues;Increased time to complete  Assistance Level: Supervision;Modified independent  Gait Deviations: Slow felix;Decreased step length bilateral;Decreased weight shift bilateral;Decreased heel strike right;Decreased heel strike left;Wide base of support  Skilled Clinical Factors: improving carry over with step length and height. supervision for safety over thresholds and uneven surfaces, occasional cuing for ronald foot clearance    Stairs  Stair Height: 8''  Device: Bilateral handrails  Number of  Goals?: Yes  Long term goal 6: Pt to complete TUG within 20sec  Patient Goals   Patient Goals : I want to be able to go home and be independent. PLAN OF CARE/Safety:   Safety Devices  Type of Devices: All fall risk precautions in place; Chair alarm in place; Left in chair      Therapy Time:   Individual   Time In 930   Time Out 1030   Minutes 60      Minutes:60  Transfer/Bed mobility training: 15  Gait trainin  Neuro re education: 15  Therapeutic ex: 0      CORAZON AGUILERA PTA, 23 at 11:19 AM

## 2023-12-26 NOTE — PROGRESS NOTES
[facility]  Facility/Department: [place] campus  Speech Language Pathology   Treatment Note          Breana Leak  1970  [id number]  [x]   confirmed    Date: 2023      Restrictions/Precautions: Fall Risk  Position Activity Restriction  Other position/activity restrictions: Pt can have thin liquids-sips. 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 Goal 4: Pt will use abdominal breathing and appropritate intonation in a conversation to support phonation in 80% with min verbal cues. Pt read aloud sentences, placing emphasis on key word, with appropriate intonation 90%x and adequate voicing 70%x. Pt was noted to use fair-good diaphragmatic breath prior to initial sentence but only fair prior to sentences that followed in that section, which impacted vocal quality. Goal 5: Pt will use breath and speech together appropriately during structured activities in 80% of opportunities with min verbal cues. Goal 6: Goal met    Goal 1: Patient will tolerate regular diet with thin liquids with no overt s/s of difficulty or aspiration. Pt reported she had a \"choking\" episode at home yesterday, when she was away on a day pass for [employer]. Pt reported it was on a sub sandwich.   Pt

## 2023-12-26 NOTE — PROGRESS NOTES
Comprehensive Nutrition Assessment    Type and Reason for Visit:  Reassess    Nutrition Recommendations/Plan:   Continue regular diet: No mixed consisitencies per SLP   Continue standard oral supplement BID (no chocolate)      Malnutrition Assessment:  Malnutrition Status:  Insufficient data (12/14/23 1311)    Context:  Social/Environmental Circumstances     Findings of the 6 clinical characteristics of malnutrition:  Energy Intake:  Less than 75% estimated energy requirements for 3 months or longer  Weight Loss:  Unable to assess (unable to confirm weights)     Body Fat Loss:  No significant body fat loss     Muscle Mass Loss:  No significant muscle mass loss    Fluid Accumulation:  No significant fluid accumulation     Strength:  Not Performed    Nutrition Assessment:    Pt progressing towards nutrition related goals, generally eating > 50% of meals.  Continue current nutrition POC    Nutrition Related Findings:    PMH-htn, hld, GERD, etoh abuse; admitted to Rehab for 'severe acute on chronic progressive fatigue and gait ataxia and quadriparesis'. Labs/meds reviewed. Pt receiving pepcid, ppi. BSE 12/13-regular consistancy with mildly thick liquids; advanced to regular liquids 12/14 per SLP. Last BM noted 12/24. No edema noted. Wound Type: None       Current Nutrition Intake & Therapies:    Average Meal Intake: 51-75%  Average Supplements Intake: 26-50%  ADULT DIET; Regular  ADULT ORAL NUTRITION SUPPLEMENT; Dinner, Lunch; Standard High Calorie/High Protein Oral Supplement    Anthropometric Measures:  Height: 172.7 cm (5' 7.99\")  Ideal Body Weight (IBW): 140 lbs (64 kg)    Admission Body Weight: 72.6 kg (160 lb) (stated 12/8)  Current Body Weight: 73.9 kg (163 lb) (12/25),   IBW. Weight Source: Bed Scale  Current BMI (kg/m2): 24.8  Usual Body Weight: 86.2 kg (190 lb) ((6/2019 KH); 172lb (8/17/2023 stated); 162lb (1/12/2023 stated);)                       BMI Categories: Normal Weight (BMI 18.5-24.9)    Estimated  Daily Nutrient Needs:  Energy Requirements Based On: Kcal/kg  Weight Used for Energy Requirements: Ideal  Energy (kcal/day): 1704-2405 (kg x 25-27)  Weight Used for Protein Requirements: Admission  Protein (g/day): 76-82 (kg x 1.2-1.3)  Method Used for Fluid Requirements: 1 ml/kcal  Fluid (ml/day): ~1900    Nutrition Diagnosis:   Inadequate oral intake (improving) related to  (c/o decreased appetite) as evidenced by poor intake prior to admission, weight loss    Nutrition Interventions:   Food and/or Nutrient Delivery: Continue Current Diet, Modify Oral Nutrition Supplement (Continue regular diet:No mixed consisitencies per SLP  Continue diabetic oral supplement BID (no chocolate))  Nutrition Education/Counseling: No recommendation at this time  Coordination of Nutrition Care: Continue to monitor while inpatient       Goals:  Previous Goal Met: Progressing toward Goal(s)  Goals: PO intake 75% or greater (stable weight)       Nutrition Monitoring and Evaluation:      Food/Nutrient Intake Outcomes: Food and Nutrient Intake, Supplement Intake  Physical Signs/Symptoms Outcomes: Weight    Discharge Planning:     Too soon to determine     Tori Byrne RD, LD

## 2023-12-26 NOTE — PROGRESS NOTES
Physical Therapy Rehab Treatment Note  Facility/Department: [facility]  Room: R247/R247-01       NAME: Luli Gipson  : 1970 (49 y.o.)  MRN: [id number]  CODE STATUS: Full Code    Date of Service: 2023       Restrictions:  Restrictions/Precautions: Fall Risk  Position Activity Restriction  Other position/activity restrictions: Pt can have thin liquids-sips. Pt cannot have mixed food/liquid such as cold cereal and milk, chicken noodle soup       SUBJECTIVE:   Subjective: \"I'm overdue for my medication\" - notified nursing    Pain  Pain: no pain reported at start or end of session. OBJECTIVE:         Bed Mobility  Overall Assistance Level: Independent  Additional Factors: Verbal cues; Increased time to complete; Head of bed flat; Without handrails; With handrails  Roll Left  Assistance Level: Independent  Skilled Clinical Factors: increased time and effort without handrail  Roll Right  Assistance Level: Independent  Skilled Clinical Factors: increased time and effort without handrail  Sit to Supine  Assistance Level: Independent  Supine to Sit  Assistance Level: Modified independent  Skilled Clinical Factors: uses handrail for completion  Scooting  Assistance Level: Independent    Transfers  Surface: To bed;From bed;Standard toilet; Wheelchair  Additional Factors: Increased time to complete  Device: Walker  Sit to Stand  Assistance Level: Modified independent  Skilled Clinical Factors: improved ability to stand from chair without arms. good technique and efficiency  Stand to Sit  Assistance Level: Supervision  Skilled Clinical Factors: improved approach to chair, less shuffling noted. controls descent with BUEs  Bed To/From Chair  Technique: Stand step  Assistance Level: Supervision  Skilled Clinical Factors: supervision without walker, increased time but demonstrates safety    Ambulation  Surface: Level surface  Device: Rolling walker  Distance: 300', 15'  Activity: Within Unit; Within Room education:0  Therapeutic ex:0      CORAZON AGUILERA, PTA, 12/26/23 at 2:10 PM

## 2023-12-26 NOTE — PROGRESS NOTES
Aide       [x]  RN                    Equipment:  WW,        At D/C their function is goaled at:   PT:Long Term Goal 1: Pt to complete bed mobility with indep  Long Term Goal 2: Pt to complete transfers with indep  Long Term Goal 3: Pt to ambulate 150ft with LRD and indep  Long Term Goal 4: Pt to manage 3 steps with HR and indep  Long Term Goal 5: Pt to complete HEP with indep  OT:Eating  Assistance Needed: Setup or clean-up assistance  CARE Score: 5  Discharge Goal: Independent, Oral Hygiene  Assistance Needed: Supervision or touching assistance  CARE Score: 4  Discharge Goal: Independent, Toileting Hygiene  Assistance needed: Substantial/maximal assistance  CARE Score: 2  Discharge Goal: Supervision or touching assistance, Shower/Bathe Self  Assistance Needed: Partial/moderate assistance  CARE Score: 3  Discharge Goal: Supervision or touching assistance  Upper Body Dressing  Assistance Needed: Partial/moderate assistance  CARE Score: 3  Discharge Goal: Independent, Lower Body Dressing  Assistance Needed: Substantial/maximal assistance  CARE Score: 2  Discharge Goal: Supervision or touching assistance, Putting On/Taking Off Footwear  Assistance Needed: Substantial/maximal assistance  CARE Score: 2  Discharge Goal: Supervision or touching assistance, Toilet Transfer  Assistance needed: Partial/moderate assistance  CARE Score: 3  Discharge Goal: Supervision or touching assistance  SP:Long Term Goals  Time Frame for Long Term Goals: 2-3 weeks  Goal 1: GOal met  Goal 2: Pt will improve her Voice production to supervised assist for effective communication of wants, needs, feelings, ideas, and medical/safety information with familiar and unfamiliar listeners.  Goal 3: Goal met  Long-term Goals  Timeframe for Long-term Goals: 2-3 weeks  Goal 1: Patient will tolerate LRD without overt s/s of aspiration with all po trials.           From a cognitive standpoint they will need:        24 hr vs daily transitioning to  supervision  -->progress to occasional             Significant problems/ barriers to functional progress include: Pt is at a high risk for functional loss,      []  Acute infection/UTI    []  Low BP's     []  COPD flare-up   []  Uncontrolled blood sugar     []  Progressive anemia     [x]  poor endurance    []  Severe pain     []  Impaired mental status    []  Urinary incontinence    []  Bowel incontinence           Plan to correct barriers to functional progress: Add scheduled rest breaks, CoQ 10 and Vit B 12, control pain by using ice Lidoderm rest and massage as well as pain medications prior to therapy. Spread therapy of 15 hours out over a 7 day window to accommodate rest breaks and medical interventions. Patient seems to be making fair to good response to these interventions. Based on a comprehensive evaluation of the above, the individualized therapy and Discharge plan will be:    -Times stated are an average that will be varied based on the patient's daily need. PT   1 1/2  hrs/day 5-7 days per wk      OT   1 1/2 hrs per day 5-7 days per wk     ST  1/2    hrs /day 3-5 days per week       Estimated LOS   1-2 week(s)    -Overall functional prognosis:     [x]  Good    []  Fair    []  Poor     -Medical Prognosis:   [x]  Good    []  Fair    []  Poor    This patient was made aware of the discussion of Plan of Care, their projected dicharge date and their projected function at discharge.          Tish Escamilla DO

## 2023-12-26 NOTE — PROGRESS NOTES
OCCUPATIONAL THERAPY  INPATIENT REHAB TREATMENT NOTE  [facility]      NAME: Nancy Jean  : 1970 (53 y.o.)  MRN: [id number]  CODE STATUS: Full Code  Room: R2/R247-01    Date of Service: 2023    Referring Physician: Dr. 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 female presented with recurrent falls and inability to care for self. Recent history of electrolyte abnormalities. MRI of the brain showed results suggestive of osmotic demyelination syndrome otherwise no acute intracranial abnormality. 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      Restrictions  Restrictions/Precautions  Restrictions/Precautions: Fall Risk                 Patient's date of birth confirmed: Yes    SAFETY:  Safety Devices  Safety Devices in place: Yes  Type of devices: All fall risk precautions in place    SUBJECTIVE:       Pain at start of treatment: No    Pain at end of treatment: No    COGNITION:  Orientation  Overall Orientation Status: Within Normal Limits  Orientation Level: Oriented X4  Cognition  Overall Cognitive Status: WFL          OBJECTIVE:     Provided pt with kitchen mobility item/retrieval task.  Pt completed kitchen mobility with 2 w/w device with SBA.  Provided instruction with step by step vc's to pt on proper placement of walker/body positioning during item retrieval from overhead cupboards, drawers from waist to knee height, and moving items into/out of fridge/freezer, microwave and oven.  Patient was able to complete the task with sequencing vc's prn for walker placement for safety of item retrieval. Pt demo good follow through on tasks.  Provided demo of walker basket/tray and showed pt a couple of options on [employer] with pt taking interest in one tray.  Pt completed all tasks to improve safety and independence with mobility to complete functional  ADL/IADL tasks. Education:   Education on safe placement of body/walker placement for item retrieval/placement to decrease risk of falls. ASSESSMENT:  Assessment: Pt cooperative, continues to demo shuffling with ambulation but improved foot clearance with 2 w/w. Activity Tolerance: Patient tolerated treatment well      PLAN OF CARE:  Strengthening, Balance training, Functional mobility training, Endurance training, Safety education & training, Patient/Caregiver education & training, Equipment evaluation, education, & procurement, Neuromuscular re-education, Positioning, Home management training, Self-Care / ADL, Coordination training  Pt to continue OT POC     Patient goals : \"get my strength and dexterity back\"  Time Frame for Long Term Goals : Within 2 weeks, pt to demonstrate progress in the following areas below to achieve specific LTGs as stated in the initial evaluation  Long Term Goal 1: Increase functional endurance  Long Term Goal 2: increase strength  Long Term Goal 3: increase ADL status        Therapy Time:   Individual Group Co-Treat   Time In 1400       Time Out 1430         Minutes 30                   ADL/IADL trainin minutes     Electronically signed by:     MICHELA Jackson,   2023, 3:50 PM

## 2023-12-27 PROCEDURE — 92526 ORAL FUNCTION THERAPY: CPT

## 2023-12-27 PROCEDURE — 97530 THERAPEUTIC ACTIVITIES: CPT

## 2023-12-27 PROCEDURE — 97110 THERAPEUTIC EXERCISES: CPT

## 2023-12-27 PROCEDURE — 97535 SELF CARE MNGMENT TRAINING: CPT

## 2023-12-27 PROCEDURE — 6370000000 HC RX 637 (ALT 250 FOR IP): Performed by: PHYSICAL MEDICINE & REHABILITATION

## 2023-12-27 PROCEDURE — 6370000000 HC RX 637 (ALT 250 FOR IP): Performed by: FAMILY MEDICINE

## 2023-12-27 PROCEDURE — 6370000000 HC RX 637 (ALT 250 FOR IP): Performed by: PSYCHIATRY & NEUROLOGY

## 2023-12-27 PROCEDURE — 6360000002 HC RX W HCPCS: Performed by: FAMILY MEDICINE

## 2023-12-27 PROCEDURE — 97112 NEUROMUSCULAR REEDUCATION: CPT

## 2023-12-27 PROCEDURE — 6370000000 HC RX 637 (ALT 250 FOR IP): Performed by: NURSE PRACTITIONER

## 2023-12-27 PROCEDURE — 97116 GAIT TRAINING THERAPY: CPT

## 2023-12-27 PROCEDURE — 92507 TX SP LANG VOICE COMM INDIV: CPT

## 2023-12-27 PROCEDURE — 1180000000 HC REHAB R&B

## 2023-12-27 PROCEDURE — 99232 SBSQ HOSP IP/OBS MODERATE 35: CPT | Performed by: PHYSICAL MEDICINE & REHABILITATION

## 2023-12-27 RX ADMIN — Medication 2000 UNITS: at 17:05

## 2023-12-27 RX ADMIN — PANTOPRAZOLE SODIUM 40 MG: 40 TABLET, DELAYED RELEASE ORAL at 05:47

## 2023-12-27 RX ADMIN — OXYBUTYNIN CHLORIDE 5 MG: 5 TABLET, EXTENDED RELEASE ORAL at 08:19

## 2023-12-27 RX ADMIN — CARBIDOPA AND LEVODOPA 1 TABLET: 25; 100 TABLET ORAL at 05:47

## 2023-12-27 RX ADMIN — CARBIDOPA AND LEVODOPA 1 TABLET: 25; 100 TABLET ORAL at 12:36

## 2023-12-27 RX ADMIN — ATORVASTATIN CALCIUM 10 MG: 10 TABLET, FILM COATED ORAL at 21:02

## 2023-12-27 RX ADMIN — SENNOSIDES 8.6 MG: 8.6 TABLET, FILM COATED ORAL at 21:13

## 2023-12-27 RX ADMIN — FAMOTIDINE 20 MG: 20 TABLET ORAL at 08:18

## 2023-12-27 RX ADMIN — Medication: at 21:03

## 2023-12-27 RX ADMIN — Medication: at 08:19

## 2023-12-27 RX ADMIN — BENZOCAINE AND MENTHOL 1 LOZENGE: 15; 3.6 LOZENGE ORAL at 12:58

## 2023-12-27 RX ADMIN — Medication 100 MG: at 08:18

## 2023-12-27 RX ADMIN — FAMOTIDINE 20 MG: 20 TABLET ORAL at 21:13

## 2023-12-27 RX ADMIN — ZOLPIDEM TARTRATE 5 MG: 5 TABLET ORAL at 21:02

## 2023-12-27 RX ADMIN — CARBIDOPA AND LEVODOPA 1 TABLET: 25; 100 TABLET ORAL at 17:05

## 2023-12-27 RX ADMIN — ENOXAPARIN SODIUM 40 MG: 100 INJECTION SUBCUTANEOUS at 08:24

## 2023-12-27 RX ADMIN — Medication 100 MCG: at 08:18

## 2023-12-27 RX ADMIN — Medication 10 MG: at 21:02

## 2023-12-27 ASSESSMENT — PAIN SCALES - GENERAL: PAINLEVEL_OUTOF10: 0

## 2023-12-27 NOTE — CARE COORDINATION
LORRAINE called [address] to speak to [place] regarding a fax we received yesterday. Spoke with a representative and she took my name and number and will have [place] call me back.  Electronically signed by Anna Prescott RN on 12/27/2023 at 9:47 AM

## 2023-12-27 NOTE — PROGRESS NOTES
OCCUPATIONAL THERAPY  INPATIENT REHAB TREATMENT NOTE  [facility]      NAME: Emily Bryan  : 1970 (52 y.o.)  MRN: [id number]  CODE STATUS: Full Code  Room: B615/X691-92    Date of Service: 2023    Referring Physician: Dr. 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 female presented with recurrent falls and inability to care for self. Recent history of electrolyte abnormalities. MRI of the brain showed results suggestive of osmotic demyelination syndrome otherwise no acute intracranial abnormality. 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      Restrictions  Restrictions/Precautions  Restrictions/Precautions: Fall Risk                 Patient's date of birth confirmed: Yes    SAFETY:  Safety Devices  Safety Devices in place: Yes  Type of devices: All fall risk precautions in place    SUBJECTIVE:       Pain at start of treatment: No    Pain at end of treatment: No    COGNITION:  Orientation  Overall Orientation Status: Within Normal Limits  Orientation Level: Oriented X4  Cognition  Overall Cognitive Status: WFL      OBJECTIVE:          Functional Mobility  Device: Rolling walker  Assistance Level: Stand by assist  Skilled Clinical Factors: from standard high back chair to w/c  Supine to Sit  Assistance Level: Modified independent  Scooting  Assistance Level: Modified independent  Sit to Stand  Assistance Level: Modified independent  Stand to Sit  Assistance Level: Modified independent     Provided pt with clothes pin/ruler activity seated with Sup. Provided pt instruction to place clothes pins on ruler switching back and forth between BUE.'\  Ruler was placed horizontal on table top, and pt used oppositional movements between 1st digit and 2nd-5th digits. Pt doffed clothes pins in the same manner as they were donned.   Pt completed task to

## 2023-12-27 NOTE — FLOWSHEET NOTE
Patient assessment complete. Patient is resting in bed at this time with some complaints of pain to buttocks rated 2/10. Repositioned patient in bed and offered to apply ET mix to buttocks/patient declined. Applied Lac-hydrin to BLE. Patient BLE are dry/flaky with 1+ pitting edema noted. Medicated patient with Tylenol 325 MG PO PRN for pain and Ambien 5 MG PO PRN for sleep per patient request with the rest of her evening medications. Patient was able to take all evening medications at once with sips of water without difficulty. Patient bilateral heels elevated on pillow. Patient states she wears a pull up depends for stress incontinence. Patient refused HS snack. Patient applied CPAP machine herself, respiratory therapist offered to help and she declined. Patient verbalized no further needs at this time. Bed alarm engaged and call light within reach. Patient request that her door is shut at night to decrease noise.

## 2023-12-27 NOTE — PROGRESS NOTES
Subjective:  The patient complains of severe acute on chronic progressive fatigue and  gait ataxia and quadriparesis partially relieved by rest, medications, PT,  OT,   SLP and rest and exacerbated by recent illness.   Patient presented through the emergency room with falls and dizziness.  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  A CTA of the neck showed noncalcified plaque with posterior wall ulceration of the origin of the right ICA without stenosis.     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  It was felt to be an aftermath of patient's underlying alcoholism and hyponatremia with a sodium as low as 105 with correction.  It was felt that nutritional demyelination was also likely comorbidity.  Oropharyngeal dysphagia was monitored for period    I am concerned about patient’s medical complexities and barriers to advancing in rehab goals including  complex medical and social issues.      She is trialing Sinemet for parkinsonian syndrome which seems to be helping     I reviewed current care and plans for further care with other rehab providers including nursing and case management.  According to recent   note, \" Patient is resting in bed at this time with some complaints of pain to buttocks rated 2/10. Repositioned patient in bed and offered to apply ET mix to buttocks/patient declined. Applied Lac-hydrin to BLE. Patient BLE are dry/flaky with 1+ pitting edema noted. Medicated patient with Tylenol 325 MG PO PRN for pain and Ambien 5 MG PO PRN for sleep per patient request with the rest of her evening medications. Patient was able to take all evening medications at once with sips of water without difficulty. Patient bilateral heels elevated on pillow. Patient states she wears a pull up depends for stress  progress. The patient is to follow-up with their family physician after discharge. The patient will have a wheelchair at discharge due to mobility limitations that significantly impair her ability to participate in ADL's including dressing, her mobility limitations cannot be sufficiently and safely resolved by the use of a cane or walker but can be sufficiently resolved by the use of a manual wheelchair; the patient or caregiver is able to safely use a manual wheelchair; the wheelchair will be used regularly in the home. Complex Active General Medical Issues that complicate care Assess & Plan:      Anxiety-emotional support provided daily, vitamin B12, encourage participation in rehabilitation support group and recreational therapy, adjust/add medications. Hyperlipidemia,  Hypertension-Acute rehab to monitor heart rate and rhythm with the option of telemetry and the effects of chronotropic medication with respect to increasing physical activity and exercise in PT, OT, ADLs with medication titration to lowest effective dosing. Continue blood signs every shift focusing on heart rate, rhythm and blood pressure checks with orthostatic checks-monitoring the effect of exercise, therapy and posture. Consult hospitalist for backup medical and adjust/add medications. Monitor heart rate and blood pressure as well as medications effects on vital signs before during and after therapy with especial focus on preventing orthostasis and falls risk.     Sleep apnea-monitor overnight pulse ox provide supplemental O2 as needed    Hyponatremia-avoid excessive fluids avoid alcohol monitor sodium level  Parkinsonian gait with recurrent falls,   Ataxic gait-focus on balance and therapy-neurology consult in titrating Sinemet    Current smoker-NicoDerm patch    Chronic pain-topicals and lowest effective dose of pain medication    Overactive bladder-check postvoid residuals    Vitamin D deficiency-  high-dose vitamin D, recheck

## 2023-12-27 NOTE — PROGRESS NOTES
Physical Therapy Rehab Treatment Note  [facility]  [facility]       NAME: Rei Smith  : 1970 (76 y.o.)  MRN: [id number]  CODE STATUS: Full Code    Date of Service: 2023       Restrictions:  Restrictions/Precautions: Fall Risk  Position Activity Restriction  Other position/activity restrictions: Pt can have thin liquids-sips. Pt cannot have mixed food/liquid such as cold cereal and milk, chicken noodle soup       SUBJECTIVE:   Subjective: Pt with no new reports, agreeable to tx. Pain  Pain: no pain reported at start or end of session. OBJECTIVE:         Sit to Supine  Assistance Level: Independent  Scooting  Assistance Level: Independent  Transfers  Surface: Wheelchair;Standard toilet; To bed  Additional Factors: Increased time to complete  Device: Walker  Sit to Stand  Assistance Level: Modified independent  Skilled Clinical Factors: overall technique has improved, good hand placement  Stand to Sit  Assistance Level: Modified independent;Supervision  Skilled Clinical Factors: progressing to Hermes, occasional supervision for safety when shuffling increases as pt fatigues      Ambulation  Surface: Level surface  Device: Rolling walker  Distance: 300', 20'  Activity: Within Unit; Within Room (hallways)  Activity Comments: decreased shuffling pattern with negotiation of turns and over thresholds with ww. Additional Factors: Verbal cues; Increased time to complete  Assistance Level: Supervision;Modified independent  Gait Deviations: Slow felix;Decreased step length bilateral;Decreased weight shift bilateral;Decreased heel strike right;Decreased heel strike left; Wide base of support  Skilled Clinical Factors: improving carry over with step length and height.  supervision for safety over thresholds and uneven surfaces, occasional cuing for ronald foot clearance and to promote heel strike        PT Exercises   Resistive Exercises: 2# ankle weights: march, LAQ x15ea ronald; RTB: HS curl, hip abd x10ea ronald  Functional Mobility Circuit Training: multiple STS from wc - good overall technique and efficiency     Activity Tolerance  Activity Tolerance: Patient tolerated treatment well       ASSESSMENT/PROGRESS TOWARDS GOALS:   Assessment  Assessment: Pt reporting increased fatigue this afternoon, requesting to use ww during ambulation and demonstrated decreased rnoald step length compared to morning session. Requested to return to bed following session to rest, demonstrating independence when ambulating around hospital room to/from restroom with ww.  Activity Tolerance: Patient tolerated treatment well    Goals:  Long Term Goals  Long Term Goal 1: Pt to complete bed mobility with indep  Long Term Goal 2: Pt to complete transfers with indep  Long Term Goal 3: Pt to ambulate 150ft with LRD and indep  Long Term Goal 4: Pt to manage 3 steps with HR and indep  Long Term Goal 5: Pt to complete HEP with indep  Additional Goals?: Yes  Long term goal 6: Pt to complete TUG within 20sec  Patient Goals   Patient Goals : I want to be able to go home and be independent.    PLAN OF CARE/Safety:   Safety Devices  Type of Devices: All fall risk precautions in place;Bed alarm in place;Call light within reach;Left in bed      Therapy Time:   Individual   Time In 1330   Time Out 1400   Minutes 30      Minutes:30  Transfer/Bed mobility trainin  Gait training: 15  Neuro re education:0  Therapeutic ex:10      CORAZON AGUILERA PTA, 23 at 2:23 PM

## 2023-12-27 NOTE — PROGRESS NOTES
Physical Therapy Rehab Treatment Note  [facility]  [id number]       NAME: Donna Harvey  : 1970 (69 y.o.)  MRN: [id number]  CODE STATUS: Full Code    Date of Service: 2023       Restrictions:  Restrictions/Precautions: Fall Risk  Position Activity Restriction  Other position/activity restrictions: Pt can have thin liquids-sips. Pt cannot have mixed food/liquid such as cold cereal and milk, chicken noodle soup       SUBJECTIVE:   Subjective: \"I'm pretty good\"    Pain  Pain: no pain reported at start or end of session. OBJECTIVE:            Transfers  Surface: Wheelchair  Additional Factors: Increased time to complete  Device: Walker  Sit to Stand  Assistance Level: Modified independent  Skilled Clinical Factors: overall technique has improved, good hand placement  Stand to Sit  Assistance Level: Modified independent;Supervision  Skilled Clinical Factors: progressing to Hermes, occasional supervision for safety when shuffling increases as pt fatigues  Car Transfer  Assistance Level: Independent  Skilled Clinical Factors: no safety concerns, good technique and efficiency    Ambulation  Surface: Level surface; Uneven surface; Carpet; Ramp  Device: Rolling walker;Cane  Distance: 300', 2 x50', 200', 100'  Activity: Within Unit (hallways)  Activity Comments: decreased shuffling pattern with negotiation of turns and over thresholds with ww. Additional Factors: Verbal cues; Increased time to complete  Assistance Level: Supervision;Modified independent  Gait Deviations: Slow felix;Decreased step length bilateral;Decreased weight shift bilateral;Decreased heel strike right;Decreased heel strike left; Wide base of support  Skilled Clinical Factors: improving carry over with step length and height.  supervision for safety over thresholds and uneven surfaces, occasional cuing for ronald foot clearance and to promote heel strike    Stairs  Stair Height: 8''  Device: One handrail;Bilateral

## 2023-12-27 NOTE — CARE COORDINATION
transitions. Freezing noted requiring physical assist and step by step verbal cues. (12/13/23 1256)  Bed Mobility  Overall Assistance Level: Independent (12/26/23 1345)  Additional Factors: Verbal cues;Increased time to complete;Head of bed flat;Without handrails;With handrails (12/26/23 1345)  Overall Assistance Level: Independent (12/26/23 1345)  Additional Factors: Verbal cues;Increased time to complete;Head of bed flat;Without handrails;With handrails (12/26/23 1345)  Roll Left  Assistance Level: Independent (12/26/23 1345)  Skilled Clinical Factors: increased time and effort without handrail (12/26/23 1345)  Roll Right  Assistance Level: Independent (12/26/23 1345)  Skilled Clinical Factors: increased time and effort without handrail (12/26/23 1345)  Sit to Supine  Assistance Level: Independent (12/27/23 1340)  Skilled Clinical Factors: increased time and effort, though improving (12/22/23 1434)  Supine to Sit  Assistance Level: Modified independent (12/26/23 1345)  Skilled Clinical Factors: uses handrail for completion (12/26/23 1345)  Scooting  Assistance Level: Independent (12/27/23 1340)  Skilled Clinical Factors: increased time and effort for completion (12/22/23 1434)  Gait:   Ambulation  Surface: Level tile (12/13/23 1258)  Device: Rolling Walker (12/13/23 1258)  Assistance:  (touching) (12/13/23 1258)  Quality of Gait: Shuffling steps. coaching pt on visual imagery of striking target with B Les during swing phase with some noted improvement in step length and continuity of stride. (12/13/23 1258)  Distance: 50ft X 2 (12/13/23 1258)  Ambulation  Surface: Level surface (12/27/23 1339)  Device: Rolling walker (12/27/23 1339)  Distance: 300', 20' (12/27/23 1339)  Activity: Within Unit;Within Room (hallways) (12/27/23 1339)  Activity Comments: decreased shuffling pattern with negotiation of turns and over thresholds with ww. (12/27/23 1339)  Additional Factors: Verbal cues;Increased time to complete  Supervision or touching assistance  OT Treatment Time: 1.5 hrs      SPEECH THERAPY       Comprehension: Within Functional Limits  Verbal Expression: Within functional limits      Diet/Swallow:        Dysphagia Outcome Severity Scale: Level 5: Mild dysphagia- Distant supervision. May need one diet consistency restricted    Compensatory Swallowing Strategies : Alternate solids and liquids, Small bites/sips, Eat/Feed slowly  Therapeutic Interventions: Bolus control exercises, Diet tolerance monitoring, Patient/Family education, Oral motor exercises      LTG:  Long Term Goals  Time Frame for Long Term Goals: 2-3 weeks  Goal 1: GOal met  Goal 2: Pt will improve her Voice production to supervised assist for effective communication of wants, needs, feelings, ideas, and medical/safety information with familiar and unfamiliar listeners. Goal 3: Goal met  Long-term Goals  Timeframe for Long-term Goals: 2-3 weeks  Goal 1: Patient will tolerate LRD without overt s/s of aspiration with all po trials. COGNITION  OT: Cognition  Overall Cognitive Status: WFL (12/27/23 0905)  Cognition Comment: slower processing (12/16/23 1203)          Orientation  Overall Orientation Status: Within Normal Limits (12/27/23 0905)  Orientation Level: Oriented X4 (12/27/23 0905)  SP:        RECREATIONAL THERAPY  Attendance to recreational therapy programs:    []  Pet Therapy  [] Music Therapy  [] Art Therapy    [] Recreation Therapy Group [] Support Group           Patient social interaction (mood, participation): good    Patient strengths: independent prior    Patients goal: go to mom's house    Problems/Barriers: difficulty with recall at times,parkinsonian features        1. Safety:          - Intervention / Plan:    [x]  falls protocol     [x]  PT/OT    [x]  SP        - Results:         2. Potential DME needs:         - Intervention / Plan:  [x]  PT/OT     [x]  Assess equipment needs/access       - Results:         3.   Weakness:

## 2023-12-27 NOTE — PROGRESS NOTES
[facility] Neurology Daily Progress Note  Name: Nancy Jean  Age: 53 y.o.  Gender: female  CodeStatus: Full Code  Allergies: Hydrochlorothiazide    Chief Complaint:No chief complaint on file.    Primary Care Provider: Dexter Mccain MD  InpatientTreatment Team: Treatment Team: Attending Provider: Chikis Cain DO; Consulting Physician: Nirmal Gimenez MD; Consulting Physician: Goldberg, Zev, PhD; Consulting Physician: Gopal Saleh MD; Occupational Therapist: Quincy Cruz OT; Registered Nurse: Catia Romano RN  Admission Date: 12/12/2023      HPI   Pt seen and examined on rehab for neurology follow-up.  Alert and oriented x 3. No reported hallucinations.  Sodium normal today at 142.  No seizure activity reported.  Patient has shown improvement in gait and bradykinesia with Sinemet.  Denies lightheadedness or dizziness.  No mental status changes or hallucinations.  BP stable.       Vitals:    12/26/23 2010   BP: 118/80   Pulse: 76   Resp: 17   Temp: 97.9 °F (36.6 °C)   SpO2: 98%        Review of Systems   Constitutional:  Negative for appetite change, chills, fatigue and fever.   HENT:  Negative for hearing loss and trouble swallowing.    Eyes:  Negative for visual disturbance.   Respiratory:  Negative for cough, chest tightness, shortness of breath and wheezing.    Cardiovascular:  Negative for chest pain, palpitations and leg swelling.   Gastrointestinal:  Negative for nausea and vomiting.   Musculoskeletal:  Positive for gait problem.   Skin:  Negative for color change and rash.   Neurological:  Positive for weakness. Negative for dizziness, tremors, seizures, syncope, facial asymmetry, speech difficulty, light-headedness, numbness and headaches.   Psychiatric/Behavioral:  Negative for agitation, behavioral problems, confusion and hallucinations. The patient is not nervous/anxious.          Physical Exam  Vitals and nursing note reviewed.   Constitutional:       General: She is not in acute distress.      reading provider will be dictating this exam?->CRC    FINDINGS:  Central hypoattenuation is noted within the ishan on image 40 and image 13 of  indeterminate age or etiology. This would be an unexpected finding in a  patient of this age. No prior studies are available for comparison. There  is no evidence for hydrocephalus. The basilar cisterns are patent. The  visualized paranasal sinuses and mastoid air cells are normally aerated. Atheromatous plaque affects the carotid siphons. Mild bifrontal parenchymal  volume loss is identified. Impression  Age indeterminate abnormality within the ishan. MRI of the brain is  recommended for further evaluation. No prior studies are available for  comparison. The findings were called to the emergency department at the time  of dictation. No results found for this or any previous visit. No results found for this or any previous visit. Carotid duplex: No results found for this or any previous visit. No results found for this or any previous visit. No results found for this or any previous visit. Echo No results found for this or any previous visit. Assessment/Plan:  Large central pontine myelinolysis  Recent sodium correction with sodium as low as 106, now 142  History of alcohol use  Gait ataxia  B12 normal  Decadron completed. Patient with bradykinesia, gait ataxia, dysarthria, masked facies. Features overlapping with CPM and Parkinson's disease as well as chronic alcohol use.  Bradykinesia improved on Sinemet with no adverse effects except  Blood pressure stable  Continue PT/OT                Collaborating physicians: Dr Luan Castillo    Electronically signed by PAUL Bennett CNP on 12/26/2023 at 8:34 PM

## 2023-12-27 NOTE — PROGRESS NOTES
OCCUPATIONAL THERAPY  INPATIENT REHAB TREATMENT NOTE  [facility]      NAME: Vandana Eden  : 1970 (52 y.o.)  MRN: [id number]  CODE STATUS: Full Code  Room: S369/W701-40    Date of Service: 2023    Referring Physician: Dr. 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 female presented with recurrent falls and inability to care for self. Recent history of electrolyte abnormalities. MRI of the brain showed results suggestive of osmotic demyelination syndrome otherwise no acute intracranial abnormality. 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      Restrictions  Restrictions/Precautions  Restrictions/Precautions: Fall Risk          Patient's date of birth confirmed: Yes    SAFETY:  Safety Devices  Safety Devices in place: Yes  Type of devices:  All fall risk precautions in place    SUBJECTIVE:     Pain at start of treatment: No    Pain at end of treatment: No      COGNITION:  Orientation  Overall Orientation Status: Within Normal Limits  Orientation Level: Oriented X4  Cognition  Overall Cognitive Status: WFL      OBJECTIVE:       Feeding  Assistance Level: Set-up  Grooming/Oral Hygiene  Assistance Level: Modified independent  Skilled Clinical Factors: oral care and brushing teeth seated at sink  Upper Extremity Bathing  Assistance Level: Modified independent  Skilled Clinical Factors: seated at sink for partial underarms only  Lower Extremity Bathing  Assistance Level: Stand by assist  Skilled Clinical Factors: standing at sink to completed unsupported, minimally unsteady but no LOB, nice wide NEGRO observed for balance  Upper Extremity Dressing  Assistance Level: Modified independent  Lower Extremity Dressing  Assistance Level: Minimal assistance  Skilled Clinical Factors: to don RLE d/t pants catching on ESE hose while donning  Putting On/Taking Off

## 2023-12-27 NOTE — PROGRESS NOTES
[facility]  Facility/Department: [facility]  Speech Language Pathology   Treatment Note          Nancy Jean  1970  R247/R247-01  [x]   confirmed    Date: 2023      Restrictions/Precautions: Fall Risk  Position Activity Restriction  Other position/activity restrictions: Pt can have thin liquids-sips. Pt cannot have mixed food/liquid such as cold cereal and milk, chicken noodle soup     ADULT DIET; Regular  ADULT ORAL NUTRITION SUPPLEMENT; Dinner, Lunch; Standard High Calorie/High Protein Oral Supplement     Respiratory Status: Room air  No active isolations    Rehab Diagnosis: Impaired mobility and activities of daily living dt osmotic demyelination syndrome     Subjective:  Alert and Cooperative        Interventions used this date:  Dysphagia Treatment and Voice Treatment    Objective/Assessment:  Patient progressing towards goals:  Goal 1: Goal met  Goal 2: Goal met  Goal 3: The patient will read phrases/sentences out loud using appropriate voicing in 90% of opportunities given min verbal cues to utilize voice strategies.  Goal 4: Pt will use abdominal breathing and appropritate intonation in a conversation to support phonation in 80% with min verbal cues.  Goal 5: Pt will use breath and speech together appropriately during structured activities in 80% of opportunities with min verbal cues.  Pt performed diaphragmatic breath with 2 second inhalation and 4 second exhalation, on average.  Pt unable to achieve longer duration.  Pt frustrated however provided encouragement.  Voice noted to be more hoarse and strangled this date.  Educated pt on strategy of easy onset.  Pt attempted to produce using sigh 'ha' with mod cues.  Pt frequently clearing throat this session.  Goal 6: Goal met    Goal 1: Patient will tolerate regular diet with thin liquids with no overt s/s of difficulty or aspiration.  Frequent throat clears post swallow of thin liquids this date.  Pt reports sensation of phlegm.    Goal 2:  Patient will demonstrate recommended swallow strategies for safe and efficient swallow at an independent level. Goal 3: Pt will complete labial/buccal/lingual ROM/strengthening/coordination exercises 10x/each with min cues, to promote safety and efficiency of oral phase of swallow. Goal 4: Pt will complete base of tongue exercises that promote anterior to posterior propulsion of bolus and improve tongue base retraction 10x/each with min cues in order to strengthen the muscles of the swallow and to safely handle the least restrictive diet level. Pt performed lingual extension and retraction with good performance 10x. Goal 5: Pt will tolerate thermal gustatory stimulation in 100% of opportunities to stimulate the swallow and improve swallow onset time, with no overt s/s of difficulty or aspiration. Pt tolerated thermal tactile stim with lemon swabs 6x with delayed initiation of swallow of 2-4 seconds. Treatment/Activity Tolerance:  Patient tolerated treatment well    Plan:  Continue per POC    Pain Assessment:  Patient does not c/o pain. Pain Re-assessment:  Patient does not c/o pain. Patient/Caregiver Education:  Patient educated on session and progression towards goals. Patient stated verbal understanding of directions. Safety Devices: All fall risk precautions in place      Dysphagia Outcome Severity Scale    SWALLOWING  Ratin      Speech Therapy Level of Assistance Scale    Voice Production:  Rating: Minimal Assistance        Therapy Time  SLP Individual Minutes  Time In: 1030  Time Out: 1100  Minutes: 30   (Speech: 15, swallow: 15)         Signature: Electronically signed by Rissa Sin.  LENNY Hernandez on 2023 at 2:37 PM

## 2023-12-27 NOTE — CARE COORDINATION
CM spoke with pt about W/C and that medical company needed more information to determine the need for W/C. The information was sent to [place] at [address].  Electronically signed by Mariah Simms RN on 12/27/2023 at 4:23 PM

## 2023-12-28 PROCEDURE — 1180000000 HC REHAB R&B

## 2023-12-28 PROCEDURE — 6370000000 HC RX 637 (ALT 250 FOR IP): Performed by: PHYSICAL MEDICINE & REHABILITATION

## 2023-12-28 PROCEDURE — 6370000000 HC RX 637 (ALT 250 FOR IP): Performed by: PSYCHIATRY & NEUROLOGY

## 2023-12-28 PROCEDURE — 97535 SELF CARE MNGMENT TRAINING: CPT

## 2023-12-28 PROCEDURE — 6370000000 HC RX 637 (ALT 250 FOR IP): Performed by: NURSE PRACTITIONER

## 2023-12-28 PROCEDURE — 97112 NEUROMUSCULAR REEDUCATION: CPT

## 2023-12-28 PROCEDURE — 92526 ORAL FUNCTION THERAPY: CPT

## 2023-12-28 PROCEDURE — 99233 SBSQ HOSP IP/OBS HIGH 50: CPT | Performed by: PHYSICAL MEDICINE & REHABILITATION

## 2023-12-28 PROCEDURE — 6360000002 HC RX W HCPCS: Performed by: FAMILY MEDICINE

## 2023-12-28 PROCEDURE — 92507 TX SP LANG VOICE COMM INDIV: CPT

## 2023-12-28 PROCEDURE — 97116 GAIT TRAINING THERAPY: CPT

## 2023-12-28 PROCEDURE — 6370000000 HC RX 637 (ALT 250 FOR IP): Performed by: FAMILY MEDICINE

## 2023-12-28 PROCEDURE — 97530 THERAPEUTIC ACTIVITIES: CPT

## 2023-12-28 RX ORDER — GUAIFENESIN 600 MG/1
600 TABLET, EXTENDED RELEASE ORAL 2 TIMES DAILY
Status: DISCONTINUED | OUTPATIENT
Start: 2023-12-28 | End: 2023-12-30 | Stop reason: HOSPADM

## 2023-12-28 RX ADMIN — ZOLPIDEM TARTRATE 5 MG: 5 TABLET ORAL at 21:21

## 2023-12-28 RX ADMIN — Medication: at 14:27

## 2023-12-28 RX ADMIN — ENOXAPARIN SODIUM 40 MG: 100 INJECTION SUBCUTANEOUS at 09:42

## 2023-12-28 RX ADMIN — FAMOTIDINE 20 MG: 20 TABLET ORAL at 21:22

## 2023-12-28 RX ADMIN — BENZOCAINE AND MENTHOL 1 LOZENGE: 15; 3.6 LOZENGE ORAL at 11:34

## 2023-12-28 RX ADMIN — FAMOTIDINE 20 MG: 20 TABLET ORAL at 09:43

## 2023-12-28 RX ADMIN — Medication: at 09:49

## 2023-12-28 RX ADMIN — Medication 2000 UNITS: at 16:46

## 2023-12-28 RX ADMIN — Medication 10 MG: at 21:30

## 2023-12-28 RX ADMIN — OXYBUTYNIN CHLORIDE 5 MG: 5 TABLET, EXTENDED RELEASE ORAL at 09:43

## 2023-12-28 RX ADMIN — PANTOPRAZOLE SODIUM 40 MG: 40 TABLET, DELAYED RELEASE ORAL at 05:59

## 2023-12-28 RX ADMIN — Medication: at 21:39

## 2023-12-28 RX ADMIN — CARBIDOPA AND LEVODOPA 1 TABLET: 25; 100 TABLET ORAL at 05:59

## 2023-12-28 RX ADMIN — GUAIFENESIN 600 MG: 600 TABLET, EXTENDED RELEASE ORAL at 14:39

## 2023-12-28 RX ADMIN — GUAIFENESIN 600 MG: 600 TABLET, EXTENDED RELEASE ORAL at 21:22

## 2023-12-28 RX ADMIN — CARBIDOPA AND LEVODOPA 1 TABLET: 25; 100 TABLET ORAL at 16:46

## 2023-12-28 RX ADMIN — Medication 100 MG: at 09:43

## 2023-12-28 RX ADMIN — SENNOSIDES 8.6 MG: 8.6 TABLET, FILM COATED ORAL at 21:30

## 2023-12-28 RX ADMIN — ATORVASTATIN CALCIUM 10 MG: 10 TABLET, FILM COATED ORAL at 21:21

## 2023-12-28 RX ADMIN — CARBIDOPA AND LEVODOPA 1 TABLET: 25; 100 TABLET ORAL at 11:34

## 2023-12-28 RX ADMIN — Medication 100 MCG: at 09:43

## 2023-12-28 RX ADMIN — LOSARTAN POTASSIUM 25 MG: 25 TABLET, FILM COATED ORAL at 09:43

## 2023-12-28 NOTE — PROGRESS NOTES
[facility]   Facility/Department: [facility]  Speech Language Pathology    Vanessa Amanda  1970  [id number]/[id number]    Date: 12/28/2023      Speech Therapy attempted to see Vanessa Amanda on this date for a/an:    Treatment    Pt was unable to be seen due to: Other:  Attempted to see patient for a therapeutic meal monitor however patient already consumed  lunch and was resting in bed. To re-attempt at a future date. Electronically signed by Valentine Melvin.  LENNY Rojas on 12/28/23 at 12:54 PM EST

## 2023-12-28 NOTE — FLOWSHEET NOTE
Patient alert, oriented and cooperative. Denies pain. Complains of slight congestion, mucinex given. Denies any further needs or complaints at this time. Call light within reach.

## 2023-12-28 NOTE — CARE COORDINATION
Cm received another fax from [facility] need more information . I Spoke with Angella Zhao asked if I could speak with Wu Hughes about what information is needed. Angella Zhao was going to message Andrae Caballero to call me.  Electronically signed by Therese Morris RN on 12/28/2023 at 2:37 PM

## 2023-12-28 NOTE — PROGRESS NOTES
Physical Therapy Rehab Treatment Note  Facility/Department: [facility]  Room: R247/R247-01       NAME: Dwayne Betancur  : 1970 (16 y.o.)  MRN: [id number]  CODE STATUS: Full Code    Date of Service: 2023       Restrictions:  Restrictions/Precautions: Fall Risk  Position Activity Restriction  Other position/activity restrictions: Pt can have thin liquids-sips. Pt cannot have mixed food/liquid such as cold cereal and milk, chicken noodle soup       SUBJECTIVE:   Subjective: Pt with no new reports, agreeable to tx. Pain  Pain: no pain reported at start or end of session. OBJECTIVE:            Transfers  Surface: Wheelchair  Device: Walker  Sit to [employer] Level: Modified independent  Stand to [employer] Level: Modified independent    Ambulation  Surface: Level surface; Uneven surface; Carpet; Ramp  Device: Rolling walker  Distance: 2 x350', 250'  Activity: Within Unit (hallways)  Activity Comments: decreased shuffling pattern with negotiation of turns and over thresholds with ww  Additional Factors: Verbal cues; Increased time to complete  Assistance Level: Supervision;Modified independent  Gait Deviations: Slow felix;Decreased step length bilateral;Decreased weight shift bilateral;Decreased heel strike right;Decreased heel strike left; Wide base of support  Skilled Clinical Factors: improving carry over with step length and height. supervision for safety over thresholds and uneven surfaces, occasional cuing for ronald foot clearance and to promote heel strike    Stairs  Stair Height: 8''  Device: One handrail  Number of Stairs: 12  Additional Factors: Non-reciprocal going up;Reciprocal going down;Non-reciprocal going down; Increased time to complete  Assistance Level: Modified independent;Supervision  Skilled Clinical Factors: intermittently switches between reciprocal and non-reciprocal with descending, improved ability to complete with one handrail  Curb  Curb Height: 6''  Device: Rolling

## 2023-12-28 NOTE — FLOWSHEET NOTE
Patient assessment complete. Patient is resting in bed at this time with no complaints of pain or discomfort. Patient was able to get up to side of bed and stood without assistance. Patient then ambulated with front wheeled walker and has shuffled but steady gait. Patient back to bed and did require some assistance getting legs in bed and blankets arranged. Applied Lac-hydrin to BLE and elevated legs on pillow to help with 1+pitting edema noted to BLE/Feet. Patient requested Ambien 5 MG PO PRN in addition to scheduled Melatonin 10 MG PO to help her sleep. Patient was able to take all PO medications at once with sips of water without d Patient watching television at this time and is ready to put on CPAP machine to go to sleep. Patient refused HS snack. Patient verbalized no further needs at this time. Bed alarm engaged and call light within reach.

## 2023-12-28 NOTE — CARE COORDINATION
LORRAINE spoke with Consuelo Pete from [facility] and she stated what I snt to them was fine and will order the w/c. She did need ascript for an 18 in chair cushion. The sript for cushion was filled out and faxed to her. Electronically signed by Milad Morton RN on 12/28/2023 at 4:20 PM

## 2023-12-28 NOTE — DISCHARGE INSTR - COC
Continuity of Care Form    Patient Name: Yash Sanchez   :  1970  MRN:  [id number]    Admit date:  2023  Discharge date:  ***    Code Status Order: Full Code   Advance Directives:     Admitting Physician:  Betty Sicard, DO  PCP: Amara Cruz MD    Discharging Nurse: [facility] Unit/Room#: A613/J863-68  Discharging Unit Phone Number: ***    Emergency Contact:   Extended Emergency Contact Information  Primary Emergency Contact: [facility] Phone: [phone]  Mobile Phone: [phone]  Relation: Parent  Preferred language: English   needed?  No  Secondary Emergency Contact: Erlin Jean  Home Phone: [phone]  Relation: Brother/Sister    Past Surgical History:  Past Surgical History:   Procedure Laterality Date    COLONOSCOPY N/A 2023    COLONOSCOPY DIAGNOSTIC performed by Savannah Cooney MD at [facility]     TOTAL VAGINAL HYSTERECTOMY  2007    UPPER GASTROINTESTINAL ENDOSCOPY N/A 2023    EGD DIAGNOSTIC ONLY performed by Savannah Cooney MD at [facility]       Immunization History:   Immunization History   Administered Date(s) Administered    COVID-19, MODERNA Bivalent, (age 12y+), IM, 48 mcg/0.5 mL 2022       Active Problems:  Patient Active Problem List   Diagnosis Code    Anxiety F41.9    Heartburn R12    Hyperlipidemia E78.5    Hypertension I10    Sleep apnea G47.30    Hyponatremia E87.1    Recurrent falls R29.6    Alcohol dependence in remission (HCC) F10.21    Pain in joint involving ankle and foot M25.579    Bilateral primary osteoarthritis of hip M16.0    Current smoker F17.200    Chronic pain G89.29    Dysfunctional uterine bleeding N93.8    Gastro-esophageal reflux disease without esophagitis K21.9    Instability of ankle M25.373    Alcohol dependence (HCC) F10.20    Overactive bladder N32.81    Panic disorder F41.0    Prediabetes R73.03    Spondylosis of lumbar spine M47.816    Sinusitis J32.9    Vitamin D

## 2023-12-28 NOTE — PROGRESS NOTES
OCCUPATIONAL THERAPY  INPATIENT REHAB TREATMENT NOTE  [facility]      NAME: Alfreda Amado  : 1970 (52 y.o.)  MRN: [id number]  CODE STATUS: Full Code  Room: X438/E428-49    Date of Service: 2023    Referring Physician: Dr. 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 female presented with recurrent falls and inability to care for self. Recent history of electrolyte abnormalities. MRI of the brain showed results suggestive of osmotic demyelination syndrome otherwise no acute intracranial abnormality. 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      Restrictions  Restrictions/Precautions  Restrictions/Precautions: Fall Risk          Patient's date of birth confirmed: Yes    SAFETY:  Safety Devices  Safety Devices in place: Yes  Type of devices: All fall risk precautions in place    SUBJECTIVE:       Pain at start of treatment: No    Pain at end of treatment: No    COGNITION:  Orientation  Overall Orientation Status: Within Normal Limits  Orientation Level: Oriented X4  Cognition  Overall Cognitive Status: WFL      OBJECTIVE:    Functional mobility  Bed Mobility: supine to EOB: Mod Ind needing extra time  Pt demo difficulty scooting to EOB  EOB to stand: Supervision  Stand/pivot into chair: SBA     Pt completed table top activity using talking pen activity. Pt completed task for applying pressure through pen d/t pt stating she didn't feel that she is applying enough pressure and having difficulty with writing. Pt had sound coming from pen, demo'ing that there is pressure. After that provided pt with pen and paper to write in personal information to make task more realistic. Pt demo fair legible hand writing and states it looks like it did before at [facility].   Dicussed with pt writing \"looped letters\" such as \"d and e\" d/t letters being hard to identify

## 2023-12-28 NOTE — PROGRESS NOTES
Physical Therapy Rehab Treatment Note  [facility]  [facility]       NAME: Rei Smith  : 1970 (1 y.o.)  MRN: [id number]  CODE STATUS: Full Code    Date of Service: 2023       Restrictions:  Restrictions/Precautions: Fall Risk  Position Activity Restriction  Other position/activity restrictions: Pt can have thin liquids-sips. Pt cannot have mixed food/liquid such as cold cereal and milk, chicken noodle soup       SUBJECTIVE:   Subjective: Pt with no new reports, agreeable to tx. Pain  Pain: no pain reported at start or end of session. OBJECTIVE:         Sit to Supine  Assistance Level: Independent  Scooting  Assistance Level: Independent    Transfers  Surface: Wheelchair; To bed;Standard toilet  Additional Factors: Increased time to complete  Device: Walker  Sit to Stand  Assistance Level: Modified independent  Stand to Sit  Assistance Level: Modified independent    Ambulation  Surface: Level surface  Device: Rolling walker  Distance: 350', 15'  Activity: Within Unit; Within Room (hallways)  Activity Comments: decreased shuffling pattern with negotiation of turns and over thresholds with ww  Additional Factors: Verbal cues; Increased time to complete  Assistance Level: Modified independent  Gait Deviations: Slow felix;Decreased step length bilateral;Decreased weight shift bilateral;Decreased heel strike right;Decreased heel strike left; Wide base of support  Skilled Clinical Factors: improving carry over with step length and height, self corrects shuffling steps        PT Exercises  Resistive Exercises: 2# ankle weights: march, LAQ x15ea ronald; RTB: HS curl, hip abd x10ea ronald     Activity Tolerance  Activity Tolerance: Patient tolerated treatment well       ASSESSMENT/PROGRESS TOWARDS GOALS:   Assessment  Assessment: Pt with increased fatigue this afternoon, requesting to return to bed at end of session.  Despite fatigue, demonstrated good tolerance and improved foot clearance throughout long distance ambulation trial. Pt self corrects shuffling steps and has become more efficienct with all ambulation, including turning. Pt's mother will be here at 9:30am tomorrow morning for a second family training session.  Activity Tolerance: Patient tolerated treatment well    Goals:  Long Term Goals  Long Term Goal 1: Pt to complete bed mobility with indep  Long Term Goal 2: Pt to complete transfers with indep  Long Term Goal 3: Pt to ambulate 150ft with LRD and indep  Long Term Goal 4: Pt to manage 3 steps with HR and indep  Long Term Goal 5: Pt to complete HEP with indep  Additional Goals?: Yes  Long term goal 6: Pt to complete TUG within 20sec  Patient Goals   Patient Goals : I want to be able to go home and be independent.    PLAN OF CARE/Safety:   Safety Devices  Type of Devices: All fall risk precautions in place;Bed alarm in place;Call light within reach;Left in bed      Therapy Time:   Individual   Time In 1330   Time Out 1400   Minutes 30      Minutes:30  Transfer/Bed mobility training: 10  Gait training: 10  Neuro re education:0  Therapeutic ex: Reji AGUILERA PTA, 12/28/23 at 2:17 PM

## 2023-12-28 NOTE — PROGRESS NOTES
Progress Note    Date:12/28/2023       Room:R2/R247-01  Patient Sherry Mays     YOB: 1970     Age:53 y.o. Assessment        Hospital Problems             Last Modified POA    * (Principal) Impaired mobility and activities of daily living dt osmotic demyelination syndrome 12/13/2023 Yes    Anxiety 12/13/2023 Yes    Hyperlipidemia 12/13/2023 Yes    Hypertension 12/13/2023 Yes    Sleep apnea 12/13/2023 Yes    Hyponatremia 12/13/2023 Yes    Recurrent falls 12/13/2023 Yes    Current smoker 12/13/2023 Yes    Chronic pain 12/13/2023 Yes    Overactive bladder 12/13/2023 Yes    Vitamin D deficiency 12/13/2023 Yes    Osmotic demyelination syndrome ([address]) 12/13/2023 Yes    Ataxic gait 12/13/2023 Yes    Alcohol abuse 12/13/2023 Yes    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 Devere Schaumann managing  Continue PT and OT     Osmotic demyelination syndrome, large central pontine myelosis, Parkinsonism  MRI of the brain showed a central T2 hyperintensity within the ishan with peripheral restricted diffusion with symmetric T2 hyperintensity involving the deep gray nuclei bilaterally. Constellation of findings are most suggestive of osmotic demyelination syndrome. Neurology following  On Sinemet     Essential hypertension, hyperlipidemia  BP within acceptable range, latest /77  Denies chest pain, dizziness, headache  On Lipitor, Cozaar     GERD  On Protonix, [facility] medicine managing acute needs. Patient will need to follow up with PCP for chronic disease management. Time spent evaluating and intervening patient, 25 minutes.  Greater than 70% of time spent focused exclusively on this patient, reviewing chart, reconciling medications, and answering questions and discussing treatment plan.    Subjective   Interval History Status: improved.     Patient claims that she is feeling and doing good today.  No complaints made.  She denies fever, chills, dizziness, shortness of breath, chest pain, and N/V/D.   Review of Systems   12 point review of systems reviewed with patient with pertinent positive listed in HPI, otherwise, negative.    Medications   Scheduled Meds:    carbidopa-levodopa  1 tablet Oral TID    Vitamin D  2,000 Units Oral Dinner    cyanocobalamin  1,000 mcg IntraMUSCular Weekly    coenzyme Q10  100 mg Oral Daily    lidocaine  3 patch TransDERmal Daily    nystatin, stomahesive in petrolatum   Topical 3 times per day    ammonium lactate   Topical TID    hydrocortisone   Rectal BID    senna  1 tablet Oral Nightly    atorvastatin  10 mg Oral Nightly    enoxaparin  40 mg SubCUTAneous Daily    famotidine  20 mg Oral BID    losartan  25 mg Oral Daily    melatonin  10 mg Oral Nightly    nicotine  1 patch TransDERmal Daily    oxyBUTYnin  5 mg Oral Daily    pantoprazole  40 mg Oral QAM AC    vitamin B-12  100 mcg Oral Daily     Continuous Infusions:   PRN Meds: benzocaine-menthol, magnesium hydroxide, acetaminophen, bisacodyl, sodium phosphate, zolpidem, ondansetron **OR** ondansetron, polyethylene glycol    Past History    Past Medical History:   has a past medical history of Anxiety, Heartburn, Hyperlipidemia, Hypertension, and Sleep apnea.    Social History:   reports that she has been smoking cigarettes. She started smoking about 37 years ago. She has a 18.5 pack-year smoking history. She has never used smokeless tobacco. She reports current alcohol use of about 4.0 standard drinks of alcohol per week. She reports current drug use. Drug: Marijuana (Weed).     Family History:   Family History   Problem Relation Age of Onset    Hypertension Mother     Other Father     Cancer

## 2023-12-28 NOTE — PROGRESS NOTES
Social Connections: Not on file   Intimate Partner Violence: Not on file   Housing Stability: Low Risk  (12/8/2023)    Housing Stability Vital Sign     Unable to Pay for Housing in the Last Year: No     Number of Places Lived in the Last Year: 1     Unstable Housing in the Last Year: No           THERAPY, MEDICAL AND NURSING COORDINATION:    [x]  Pain medication before therapies     [x]  Check orthostatic BP and monitor heart rate and medications effects with therapy      [x]  Ambulate to the bathroom in room    [x]  Add scheduled rest beaks     [x]  In room therapies as needed      Discharge date set for:         12/30/23            Home with: Mother (was alone)  with help from    mother            And:      Home Health Care:     [x]  PT    [x]  OT    []  ST   [x]  Aide       [x]  RN                    Equipment:  [employer],        At D/C their function is goaled at:   PT:Long Term Goal 1: Pt to complete bed mobility with indep  Long Term Goal 2: Pt to complete transfers with indep  Long Term Goal 3: Pt to ambulate 150ft with LRD and indep  Long Term Goal 4: Pt to manage 3 steps with HR and indep  Long Term Goal 5: Pt to complete HEP with indep  OT:Eating  Assistance Needed: Setup or clean-up assistance  CARE Score: 5  Discharge Goal: Independent, Oral Hygiene  Assistance Needed: Supervision or touching assistance  CARE Score: 4  Discharge Goal: Independent, [address] needed: Substantial/maximal assistance  CARE Score: 2  Discharge Goal: Supervision or touching assistance, Shower/Bathe Self  Assistance Needed: Partial/moderate assistance  CARE Score: 3  Discharge Goal: Supervision or touching assistance  Upper Body Dressing  Assistance Needed: Partial/moderate assistance  CARE Score: 3  Discharge Goal: Independent, Lower Body Dressing  Assistance Needed: Substantial/maximal assistance  CARE Score: 2  Discharge Goal: Supervision or touching assistance, Putting On/Taking Off Footwear  Assistance Needed: Substantial/maximal assistance  CARE Score: 2  Discharge Goal: Supervision or touching assistance, Toilet Transfer  Assistance needed: Partial/moderate assistance  CARE Score: 3  Discharge Goal: Supervision or touching assistance  SP:Long Term Goals  Time Frame for Long Term Goals: 2-3 weeks  Goal 1: GOal met  Goal 2: Pt will improve her Voice production to supervised assist for effective communication of wants, needs, feelings, ideas, and medical/safety information with familiar and unfamiliar listeners.  Goal 3: Goal met  Long-term Goals  Timeframe for Long-term Goals: 2-3 weeks  Goal 1: Patient will tolerate LRD without overt s/s of aspiration with all po trials.           From a cognitive standpoint they will need:        24 hr vs daily transitioning to supervision  -->progress to occasional             Significant problems/ barriers to functional progress include: Pt is at a high risk for functional loss,      []  Acute infection/UTI    []  Low BP's     []  COPD flare-up   []  Uncontrolled blood sugar     []  Progressive anemia     [x]  poor endurance    []  Severe pain     []  Impaired mental status    []  Urinary incontinence    []  Bowel incontinence           Plan to correct barriers to functional progress:   Add scheduled rest breaks, CoQ 10 and Vit B 12, control pain by using ice Lidoderm rest and massage as well as pain medications prior to therapy.  Spread therapy of 15 hours out over a 7 day window to accommodate rest breaks and medical interventions.  Patient seems to be making fair to good response to these interventions.         Based on a comprehensive evaluation of the above, the individualized therapy and Discharge plan will be:    -Times stated are an average that will be varied based on the patient's daily need.        PT   1 1/2  hrs/day 5-7 days per wk      OT   1 1/2 hrs per day 5-7 days per wk     ST  1/2    hrs /day 3-5 days per week       Estimated LOS   1-2 week(s)    -Overall

## 2023-12-28 NOTE — PROGRESS NOTES
OCCUPATIONAL THERAPY  INPATIENT REHAB TREATMENT NOTE  [facility]      NAME: Nancy Jean  : 1970 (53 y.o.)  MRN: [id number]  CODE STATUS: Full Code  Room: R2/R247-01    Date of Service: 2023    Referring Physician: Dr. 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 female presented with recurrent falls and inability to care for self. Recent history of electrolyte abnormalities. MRI of the brain showed results suggestive of osmotic demyelination syndrome otherwise no acute intracranial abnormality. 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      Restrictions  Restrictions/Precautions  Restrictions/Precautions: Fall Risk                 Patient's date of birth confirmed: Yes    SAFETY:  Safety Devices  Safety Devices in place: Yes  Type of devices: All fall risk precautions in place    SUBJECTIVE:       Pain at start of treatment: No    Pain at end of treatment: No    COGNITION:  Orientation  Overall Orientation Status: Within Normal Limits  Orientation Level: Oriented X4  Cognition  Overall Cognitive Status: WFL      Pt's current cognitive status is:  Comprehension: Independent  Expression: Independent  Social Interaction: Independent  Problem Solving: Mod I  Memory: Independent    OBJECTIVE:         Grooming/Oral Hygiene  Assistance Level: Modified independent  Skilled Clinical Factors: oral care and brushing teeth seated at sink  Upper Extremity Bathing  Assistance Level: Modified independent  Skilled Clinical Factors: seated in the shower  Lower Extremity Bathing  Assistance Level: Supervision  Skilled Clinical Factors: for safety/balance  Upper Extremity Dressing  Assistance Level: Independent  Lower Extremity Dressing  Assistance Level: Supervision  Skilled Clinical Factors: mod I seated, Sup in standing per pt balance/safety  Putting  status        Therapy Time:   Individual Group Co-Treat   Time In 830       Time Out 0930         Minutes 60                   ADL/IADL trainin minutes     Electronically signed by:     MICHELA Koo,   2023, 10:25 AM

## 2023-12-28 NOTE — CARE COORDINATION
CM called [address] to check on the status of the w/c . Iwas informed that Jean Solo would call me back .  Electronically signed by Gabe Mckeon RN on 12/28/2023 at 12:01 PM

## 2023-12-28 NOTE — PROGRESS NOTES
[facility]  Facility/Department: [facility]  Speech Language Pathology   Treatment Note          Nancy Jean  1970  R247/R247-01  [x]   confirmed    Date: 2023      Restrictions/Precautions: Fall Risk  Position Activity Restriction  Other position/activity restrictions: Pt can have thin liquids-sips. Pt cannot have mixed food/liquid such as cold cereal and milk, chicken noodle soup     ADULT DIET; Regular  ADULT ORAL NUTRITION SUPPLEMENT; Dinner, Lunch; Standard High Calorie/High Protein Oral Supplement     Respiratory Status: Room air  No active isolations    Rehab Diagnosis: Impaired mobility and activities of daily living dt osmotic demyelination syndrome     Subjective:  Alert, Cooperative, and Motivated        Interventions used this date:  Dysphagia Treatment and Voice Treatment    Objective/Assessment:  Patient progressing towards goals:  Goal 1: Goal met  Goal 2: Goal met  Goal 3: The patient will read phrases/sentences out loud using appropriate voicing in 90% of opportunities given min verbal cues to utilize voice strategies.  Goal 4: Pt will use abdominal breathing and appropritate intonation in a conversation to support phonation in 80% with min verbal cues.  Goal 5: Pt will use breath and speech together appropriately during structured activities in 80% of opportunities with min verbal cues.  Pt performed diaphragmatic breath with exhalation 6-8 seconds.  Pt transitioned to exhalation with vocal adduction with use of \"ha' with difficulty.  Pt had improved success with yawn sigh technique for easy onset x 5 trials.  Pt repeated single words beginning with /h/ with easy onset with 50% accuracy with min cues.  Vocal strain noted throughout.  Pt performed pitch scales with fair-poor performance despite attempts.    Goal 6: Goal met    Goal 1: Patient will tolerate regular diet with thin liquids with no overt s/s of difficulty or aspiration.  Goal 2: Patient will demonstrate recommended  swallow strategies for safe and efficient swallow at an independent level. Goal 3: Pt will complete labial/buccal/lingual ROM/strengthening/coordination exercises 10x/each with min cues, to promote safety and efficiency of oral phase of swallow. Pt performed labial and buccal ROM/strengthening exercises with good performance with cues to over-exaggerate movements. Goal 4: Pt will complete base of tongue exercises that promote anterior to posterior propulsion of bolus and improve tongue base retraction 10x/each with min cues in order to strengthen the muscles of the swallow and to safely handle the least restrictive diet level. Pt c/o phlegm in throat that comes and goes throughout the day. Frequently throat clearing in session. Pt performed effortful swallows with different postural changes and attempted Latasha maneuver but unsuccessful. Pt noted slight improvement. Goal 5: Pt will tolerate thermal gustatory stimulation in 100% of opportunities to stimulate the swallow and improve swallow onset time, with no overt s/s of difficulty or aspiration. Rec something to decrease phlegm  Discussed with [facility] RN      Treatment/Activity Tolerance:  Patient tolerated treatment well    Plan:  Continue per POC    Pain Assessment:  Patient does not c/o pain. Pain Re-assessment:  Patient does not c/o pain. Patient/Caregiver Education:  Patient educated on session and progression towards goals. Patient stated verbal understanding of directions. Safety Devices: All fall risk precautions in place      Dysphagia Outcome Severity Scale    SWALLOWING  Ratin      Speech Therapy Level of Assistance Scale    Voice Production:  Rating: Minimal Assistance      Therapy Time  SLP Individual Minutes  Time In: 1030  Time Out: 1100  Minutes: 30   (Speech: 15, swallow: 15)         Signature: Electronically signed by Sid Yan.  LENNY Brannon on 2023 at 11:26 AM

## 2023-12-29 LAB
ANION GAP SERPL CALCULATED.3IONS-SCNC: 14 MEQ/L (ref 9–15)
BASOPHILS # BLD: 0 K/UL (ref 0–0.2)
BASOPHILS NFR BLD: 0.4 %
BUN SERPL-MCNC: 9 MG/DL (ref 6–20)
CALCIUM SERPL-MCNC: 9.4 MG/DL (ref 8.5–9.9)
CHLORIDE SERPL-SCNC: 103 MEQ/L (ref 95–107)
CO2 SERPL-SCNC: 23 MEQ/L (ref 20–31)
CREAT SERPL-MCNC: 0.56 MG/DL (ref 0.5–0.9)
EOSINOPHIL # BLD: 0.2 K/UL (ref 0–0.7)
EOSINOPHIL NFR BLD: 2.3 %
ERYTHROCYTE [DISTWIDTH] IN BLOOD BY AUTOMATED COUNT: 12.1 % (ref 11.5–14.5)
GLUCOSE SERPL-MCNC: 110 MG/DL (ref 70–99)
HCT VFR BLD AUTO: 35.5 % (ref 37–47)
HGB BLD-MCNC: 11.8 G/DL (ref 12–16)
LYMPHOCYTES # BLD: 1.8 K/UL (ref 1–4.8)
LYMPHOCYTES NFR BLD: 19.3 %
MCH RBC QN AUTO: 33.8 PG (ref 27–31.3)
MCHC RBC AUTO-ENTMCNC: 33.2 % (ref 33–37)
MCV RBC AUTO: 101.7 FL (ref 79.4–94.8)
MONOCYTES # BLD: 0.7 K/UL (ref 0.2–0.8)
MONOCYTES NFR BLD: 7 %
NEUTROPHILS # BLD: 6.7 K/UL (ref 1.4–6.5)
NEUTS SEG NFR BLD: 70.6 %
PLATELET # BLD AUTO: 263 K/UL (ref 130–400)
POTASSIUM SERPL-SCNC: 4.6 MEQ/L (ref 3.4–4.9)
RBC # BLD AUTO: 3.49 M/UL (ref 4.2–5.4)
SODIUM SERPL-SCNC: 140 MEQ/L (ref 135–144)
WBC # BLD AUTO: 9.5 K/UL (ref 4.8–10.8)

## 2023-12-29 PROCEDURE — 97535 SELF CARE MNGMENT TRAINING: CPT

## 2023-12-29 PROCEDURE — 92526 ORAL FUNCTION THERAPY: CPT

## 2023-12-29 PROCEDURE — 80048 BASIC METABOLIC PNL TOTAL CA: CPT

## 2023-12-29 PROCEDURE — 85025 COMPLETE CBC W/AUTO DIFF WBC: CPT

## 2023-12-29 PROCEDURE — 99232 SBSQ HOSP IP/OBS MODERATE 35: CPT | Performed by: PSYCHIATRY & NEUROLOGY

## 2023-12-29 PROCEDURE — 6370000000 HC RX 637 (ALT 250 FOR IP): Performed by: PHYSICAL MEDICINE & REHABILITATION

## 2023-12-29 PROCEDURE — 6370000000 HC RX 637 (ALT 250 FOR IP): Performed by: NURSE PRACTITIONER

## 2023-12-29 PROCEDURE — 6370000000 HC RX 637 (ALT 250 FOR IP): Performed by: FAMILY MEDICINE

## 2023-12-29 PROCEDURE — 1180000000 HC REHAB R&B

## 2023-12-29 PROCEDURE — 97112 NEUROMUSCULAR REEDUCATION: CPT

## 2023-12-29 PROCEDURE — 97116 GAIT TRAINING THERAPY: CPT

## 2023-12-29 PROCEDURE — 97530 THERAPEUTIC ACTIVITIES: CPT

## 2023-12-29 PROCEDURE — 97110 THERAPEUTIC EXERCISES: CPT

## 2023-12-29 PROCEDURE — 36415 COLL VENOUS BLD VENIPUNCTURE: CPT

## 2023-12-29 PROCEDURE — 99232 SBSQ HOSP IP/OBS MODERATE 35: CPT | Performed by: PHYSICAL MEDICINE & REHABILITATION

## 2023-12-29 PROCEDURE — 6360000002 HC RX W HCPCS: Performed by: FAMILY MEDICINE

## 2023-12-29 PROCEDURE — 6370000000 HC RX 637 (ALT 250 FOR IP): Performed by: PSYCHIATRY & NEUROLOGY

## 2023-12-29 RX ORDER — MECOBALAMIN 5000 MCG
10 TABLET,DISINTEGRATING ORAL NIGHTLY
Qty: 30 TABLET | Refills: 5 | Status: SHIPPED | OUTPATIENT
Start: 2023-12-29

## 2023-12-29 RX ORDER — CHOLECALCIFEROL (VITAMIN D3) 50 MCG
2000 TABLET ORAL
Qty: 60 TABLET | Refills: 5 | Status: SHIPPED | OUTPATIENT
Start: 2023-12-29

## 2023-12-29 RX ORDER — ZOLPIDEM TARTRATE 5 MG/1
5 TABLET ORAL NIGHTLY PRN
Qty: 28 TABLET | Refills: 0 | Status: SHIPPED | OUTPATIENT
Start: 2023-12-29 | End: 2024-01-26

## 2023-12-29 RX ADMIN — Medication 100 MG: at 08:17

## 2023-12-29 RX ADMIN — CARBIDOPA AND LEVODOPA 1 TABLET: 25; 100 TABLET ORAL at 05:30

## 2023-12-29 RX ADMIN — Medication 100 MCG: at 08:17

## 2023-12-29 RX ADMIN — SENNOSIDES 8.6 MG: 8.6 TABLET, FILM COATED ORAL at 21:26

## 2023-12-29 RX ADMIN — POLYETHYLENE GLYCOL 3350 17 G: 17 POWDER, FOR SOLUTION ORAL at 12:45

## 2023-12-29 RX ADMIN — Medication: at 08:18

## 2023-12-29 RX ADMIN — Medication 2000 UNITS: at 17:27

## 2023-12-29 RX ADMIN — OXYBUTYNIN CHLORIDE 5 MG: 5 TABLET, EXTENDED RELEASE ORAL at 08:17

## 2023-12-29 RX ADMIN — FAMOTIDINE 20 MG: 20 TABLET ORAL at 08:17

## 2023-12-29 RX ADMIN — GUAIFENESIN 600 MG: 600 TABLET, EXTENDED RELEASE ORAL at 08:17

## 2023-12-29 RX ADMIN — ZOLPIDEM TARTRATE 5 MG: 5 TABLET ORAL at 21:26

## 2023-12-29 RX ADMIN — Medication: at 21:33

## 2023-12-29 RX ADMIN — Medication 10 MG: at 21:26

## 2023-12-29 RX ADMIN — GUAIFENESIN 600 MG: 600 TABLET, EXTENDED RELEASE ORAL at 21:26

## 2023-12-29 RX ADMIN — FAMOTIDINE 20 MG: 20 TABLET ORAL at 21:26

## 2023-12-29 RX ADMIN — CARBIDOPA AND LEVODOPA 1 TABLET: 25; 100 TABLET ORAL at 17:27

## 2023-12-29 RX ADMIN — ENOXAPARIN SODIUM 40 MG: 100 INJECTION SUBCUTANEOUS at 08:18

## 2023-12-29 RX ADMIN — BENZOCAINE AND MENTHOL 1 LOZENGE: 15; 3.6 LOZENGE ORAL at 21:26

## 2023-12-29 RX ADMIN — LOSARTAN POTASSIUM 25 MG: 25 TABLET, FILM COATED ORAL at 08:17

## 2023-12-29 RX ADMIN — CARBIDOPA AND LEVODOPA 1 TABLET: 25; 100 TABLET ORAL at 12:42

## 2023-12-29 RX ADMIN — PANTOPRAZOLE SODIUM 40 MG: 40 TABLET, DELAYED RELEASE ORAL at 05:30

## 2023-12-29 RX ADMIN — ATORVASTATIN CALCIUM 10 MG: 10 TABLET, FILM COATED ORAL at 21:26

## 2023-12-29 NOTE — PROGRESS NOTES
Physical Therapy Rehab Treatment Note  Facility/Department: [facility]  Room: R247/R247-01       NAME: Saul Arceo  : 1970 (63 y.o.)  MRN: [id number]  CODE STATUS: Full Code    Date of Service: 2023       Restrictions:  Restrictions/Precautions: Fall Risk       SUBJECTIVE:   Subjective: Pt states she is tired. States she has been up since 330am.    Pain  Pain: 2-3/10 back dull ache Pt declined intervention    OBJECTIVE:         Transfers  Sit to Stand: Modified independent  Stand to Sit: Modified independent  Bed to Chair: Modified independent  Car Transfer: Modified independent    Ambulation  Surface: Level tile;Uneven;Carpet  Device: Rolling Walker  Quality of Gait: Decreased heel strike  Gait Deviations: Decreased step length;Decreased step height;Shuffles  Distance: 350ft  Comments: Ambulated multiple short distances around therapy gym. VCs for tapping targets with heel with fair follow through. PT Exercises  Dynamic Standing Balance Exercises: Gait against RTB resistance at khalida rail and with [address]; Gait drills at khalida rail stepping over canes and through agility ladder Fwd/lateral with VCs to increase toe off/heel strike         ASSESSMENT/PROGRESS TOWARDS GOALS:   Assessment: Goals met except requires supervision on stairs. Goals:  Long Term Goals  Long Term Goal 1: Pt to complete bed mobility with indep-met  Long Term Goal 2: Pt to complete transfers with indep-met  Long Term Goal 3: Pt to ambulate 150ft with LRD and indep-met  Long Term Goal 4: Pt to manage 3 steps with HR and indep-supervision with 1 rail  Long Term Goal 5: Pt to complete HEP with indep-met  Additional Goals?: Yes  Long term goal 6: Pt to complete TUG within 20sec-met 16sec on   Patient Goals   Patient Goals : I want to be able to go home and be independent. PLAN OF CARE/Safety: Cont per POC.        Therapy Time:   Individual   Time In 1330   Time Out 1400   Minutes 30      Minutes:  Transfer/Bed mobility trainin  Gait training:10  Neuro re education:20  Therapeutic ex:0    Fabiana Chilel, REBECCA, 23 at 1:59 PM

## 2023-12-29 NOTE — CARE COORDINATION
CM spoke with Karolina Fernández at [employer] . She stated wheelchair and cushion would be delivered today to [facility]. Pt asked if mother could take equipment home tonight  and she was told that is fine.  Electronically signed by Deborah Novak RN on 12/29/2023 at 1:55 PM

## 2023-12-29 NOTE — PROGRESS NOTES
OCCUPATIONAL THERAPY  INPATIENT REHAB TREATMENT NOTE  [facility]      NAME: Lashell Sequeira  : 1970 (52 y.o.)  MRN: [id number]  CODE STATUS: Full Code  Room: S028/G174-03    Date of Service: 2023    Referring Physician: Dr. 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 female presented with recurrent falls and inability to care for self. Recent history of electrolyte abnormalities. MRI of the brain showed results suggestive of osmotic demyelination syndrome otherwise no acute intracranial abnormality. 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      Restrictions  Restrictions/Precautions  Restrictions/Precautions: Fall Risk                 Patient's date of birth confirmed: Yes    SAFETY:  Safety Devices  Safety Devices in place: Yes  Type of devices: All fall risk precautions in place    SUBJECTIVE:       Pain at start of treatment: No    Pain at end of treatment: No    COGNITION:  Orientation  Overall Orientation Status: Within Normal Limits  Orientation Level: Oriented X4  Cognition  Overall Cognitive Status: WFL      Pt's current cognitive status is:  Comprehension: Independent  Expression: Independent  Social Interaction: Independent  Problem Solving: Supervision  Memory: Mod I    OBJECTIVE:         Feeding  Assistance Level: Set-up  Grooming/Oral Hygiene  Assistance Level: Modified independent  Skilled Clinical Factors: oral care and brushing teeth seated at sink  Upper Extremity Bathing  Assistance Level: Modified independent  Skilled Clinical Factors: seated in the shower  Lower Extremity Bathing  Assistance Level: Modified independent  Skilled Clinical Factors: seated in shower for legs/feet and use of grab bar for standing, improved balance noted  Upper Extremity Dressing  Assistance Level:  [address]

## 2023-12-29 NOTE — PROGRESS NOTES
[facility]  Facility/Department: [facility]  Speech Language Pathology   Treatment Note          Zoey Bedoya  1970  C443/A845-27  [x]   confirmed    Date: 2023      Restrictions/Precautions: Fall Risk  Position Activity Restriction  Other position/activity restrictions: Pt can have thin liquids-sips. Pt cannot have mixed food/liquid such as cold cereal and milk, chicken noodle soup     ADULT DIET; Regular  ADULT ORAL NUTRITION SUPPLEMENT; Dinner, Lunch; Standard High Calorie/High Protein Oral Supplement     Respiratory Status: Room air  No active isolations    Rehab Diagnosis: Impaired mobility and activities of daily living dt osmotic demyelination syndrome     Subjective:  Alert and Cooperative        Interventions used this date:  Dysphagia Treatment    Objective/Assessment:  Patient progressing towards goals:  Goal 1: Goal met  Goal 2: Goal met  Goal 3: The patient will read phrases/sentences out loud using appropriate voicing in 90% of opportunities given min verbal cues to utilize voice strategies. Goal 4: Pt will use abdominal breathing and appropritate intonation in a conversation to support phonation in 80% with min verbal cues. Goal 5: Pt will use breath and speech together appropriately during structured activities in 80% of opportunities with min verbal cues. Pt provided with handouts of respiration and phonation exercises. Placed in red folder with previous handouts provided. Goal 6: Goal met    Goal 1: Patient will tolerate regular diet with thin liquids with no overt s/s of difficulty or aspiration. Goal 2: Patient will demonstrate recommended swallow strategies for safe and efficient swallow at an independent level. Goal 3: Pt will complete labial/buccal/lingual ROM/strengthening/coordination exercises 10x/each with min cues, to promote safety and efficiency of oral phase of swallow.   Goal 4: Pt will complete base of tongue exercises that promote anterior to posterior

## 2023-12-29 NOTE — CARE COORDINATION
CM gave the pt a wheeled walker for home. It is in her room. Pt and I were talking and she asked if [facility] [address] knew she was going to her mom's. CM proceeded to call [facility]  and spoke to Jelani. Mp gave Jelani the  mom's address  and cell number. Cm will call [address] later today to check on w/c status.  Electronically signed by Landy Marinelli RN on 12/29/2023 at 11:36 AM

## 2023-12-29 NOTE — PROGRESS NOTES
OCCUPATIONAL THERAPY  INPATIENT REHAB TREATMENT NOTE  [facility]      NAME: Emily Bryan  : 1970 (52 y.o.)  MRN: [id number]  CODE STATUS: Full Code  Room: Q264/W901-56    Date of Service: 2023    Referring Physician: Dr. 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 female presented with recurrent falls and inability to care for self. Recent history of electrolyte abnormalities. MRI of the brain showed results suggestive of osmotic demyelination syndrome otherwise no acute intracranial abnormality. 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      Restrictions  Restrictions/Precautions  Restrictions/Precautions: Fall Risk            Patient's date of birth confirmed: Yes    SAFETY:  Safety Devices  Safety Devices in place: Yes  Type of devices: All fall risk precautions in place    SUBJECTIVE:       Pain at start of treatment: No    Pain at end of treatment: No    COGNITION:  Orientation  Overall Orientation Status: Within Normal Limits  Orientation Level: Oriented X4  Cognition  Overall Cognitive Status: WFL      OBJECTIVE:     Pt completed horizontal dowel ann tree/ring task and clothes pin/ruler activity standing at table top height with SBA. Pt instructed to complete each side of tree with coordinating UE. Pt donned/doffed 96 rings one by one and donned clothes pins one by one, taking them off 2 by 2 (one with each hand unsupported), F/F- standing balance noted throughout activities  Upon doffing each item, each was placed back into its container. Pt stood 17:10. Pt completed task to increase strength, fine motor skills, act tolerance to continue improving levels of Ind with functional daily tasks.        Functional Mobility  Device: Rolling walker  Activity: transferring from standard high back chair to w/c with SBA without AE  Assistance

## 2023-12-29 NOTE — PROGRESS NOTES
Assessment completed. A&O x4. Denies pain at this time. Pt took most meds with water without difficulty, ad the bigger pills whole with applesauce. In chair with alarm activated. Call light in reach.  Electronically signed by Reinier Francis LPN on 13/66/4060 at 10:55 AM

## 2023-12-29 NOTE — PLAN OF CARE
Problem: Safety - Adult  Goal: Free from fall injury  Outcome: Progressing     Problem: Discharge Planning  Goal: Discharge to home or other facility with appropriate resources  Outcome: Progressing  Flowsheets (Taken 12/29/2023 [id number])  Discharge to home or other facility with appropriate resources:   Identify barriers to discharge with patient and caregiver   Identify discharge learning needs (meds, wound care, etc)     Problem: Pain  Goal: Verbalizes/displays adequate comfort level or baseline comfort level  Outcome: Progressing     Problem: Skin/Tissue Integrity  Goal: Absence of new skin breakdown  Description: 1. Monitor for areas of redness and/or skin breakdown  2. Assess vascular access sites hourly  3. Every 4-6 hours minimum:  Change oxygen saturation probe site  4. Every 4-6 hours:  If on nasal continuous positive airway pressure, respiratory therapy assess nares and determine need for appliance change or resting period.   Outcome: Progressing     Problem: Nutrition Deficit:  Goal: Optimize nutritional status  Outcome: Progressing

## 2023-12-29 NOTE — CARE COORDINATION
The wheelchair and cushion delivered to pt.  Electronically signed by Madonna Carranza RN on 12/29/2023 at 2:14 PM

## 2023-12-29 NOTE — PROGRESS NOTES
Physical Therapy Rehab Treatment Note  Facility/Department: [facility]  Room: R2/R247-01       NAME: Lucina Anaya  : 1970 (78 y.o.)  MRN: [id number]  CODE STATUS: Full Code    Date of Service: 2023     Restrictions:  Restrictions/Precautions: Fall Risk    SUBJECTIVE:   Subjective: Pt states she is tired. States she has been up since 330am.  Pt states she feels good about going home tomorrow. States she is ready. Pt's mother reports pt looks much better. Pain  Pain: no pain reported at start or end of session. OBJECTIVE:         Bed mobility  Rolling to Left: Independent  Rolling to Right: Independent  Supine to Sit: Independent  Sit to Supine: Independent    Transfers  Sit to Stand: Modified independent  Stand to Sit: Modified independent  Bed to Chair: Modified independent  Car Transfer: Modified independent    Ambulation  Surface: Level tile;Uneven;Carpet  Device: Rolling Walker  Quality of Gait: Decreased heel strike  Gait Deviations: Decreased step length;Decreased step height;Shuffles  Distance: 350ft  Comments: Ambulated multiple short distances around therapy gym. VCs for tapping targets with heel with fair follow through. Stairs/Curb  Stairs?: Yes  Stairs  # Steps : 12  Stairs Height: 8\"  Rails: Bilateral;Left ascending  Assistance: Supervision  Comment: Supervision 8in steps with 1 rail. Indep with 6in curb with [employer].        PT Exercises  A/AROM Exercises: seated: AP, LAQ, march, hip add with ball, hip abd x20ea ronald; sink ex x10 ea  Issued for HEP  Functional Mobility Circuit Training: Ambulating short distances with [employer] with external cues focusing on decreasing shuffeling. Ambulated 15ft in 16 steps initially. Decreased to 12 steps with external cues. Education Provided: Family Education;Home Exercise Program  Education  Education Given To: Patient; Family  Education Provided: Family Education;Home Exercise Program  Education Provided Comments: Pt's mother observed and

## 2023-12-29 NOTE — FLOWSHEET NOTE
Patient assessment complete. Patient is resting in bed at this time with no complaints of pain or discomfort. Patient requested applesauce with her evening medications tonight. Patient requested Ambien 5 MG PO PRN for sleep with her scheduled evening medications. Patient took pills whole a few at a time with bites of applesauce. Patient states she had some nasal drainage and a cough earlier today. Gave patient Cepacol lozenge PO PRN after she took her pills. Patient ambulated to restroom and back to bed with front wheeled walker with steady shuffling gait. Patient does not want to wear CPAP machine at this time and is watching [employer]. Patient verbalized no further needs at this time. Bed alarm engaged and call light within reach.

## 2023-12-30 VITALS
RESPIRATION RATE: 18 BRPM | WEIGHT: 163.6 LBS | BODY MASS INDEX: 24.8 KG/M2 | DIASTOLIC BLOOD PRESSURE: 75 MMHG | HEART RATE: 72 BPM | HEIGHT: 68 IN | OXYGEN SATURATION: 96 % | SYSTOLIC BLOOD PRESSURE: 102 MMHG | TEMPERATURE: 98.1 F

## 2023-12-30 PROCEDURE — 97535 SELF CARE MNGMENT TRAINING: CPT

## 2023-12-30 PROCEDURE — 6370000000 HC RX 637 (ALT 250 FOR IP): Performed by: PSYCHIATRY & NEUROLOGY

## 2023-12-30 PROCEDURE — 6370000000 HC RX 637 (ALT 250 FOR IP): Performed by: PHYSICAL MEDICINE & REHABILITATION

## 2023-12-30 PROCEDURE — 6360000002 HC RX W HCPCS: Performed by: FAMILY MEDICINE

## 2023-12-30 PROCEDURE — 6370000000 HC RX 637 (ALT 250 FOR IP): Performed by: FAMILY MEDICINE

## 2023-12-30 RX ADMIN — PANTOPRAZOLE SODIUM 40 MG: 40 TABLET, DELAYED RELEASE ORAL at 05:46

## 2023-12-30 RX ADMIN — OXYBUTYNIN CHLORIDE 5 MG: 5 TABLET, EXTENDED RELEASE ORAL at 09:10

## 2023-12-30 RX ADMIN — ENOXAPARIN SODIUM 40 MG: 100 INJECTION SUBCUTANEOUS at 12:26

## 2023-12-30 RX ADMIN — CARBIDOPA AND LEVODOPA 1 TABLET: 25; 100 TABLET ORAL at 12:27

## 2023-12-30 RX ADMIN — GUAIFENESIN 600 MG: 600 TABLET, EXTENDED RELEASE ORAL at 09:09

## 2023-12-30 RX ADMIN — Medication 100 MCG: at 09:10

## 2023-12-30 RX ADMIN — Medication 100 MG: at 09:07

## 2023-12-30 RX ADMIN — FAMOTIDINE 20 MG: 20 TABLET ORAL at 09:09

## 2023-12-30 RX ADMIN — CARBIDOPA AND LEVODOPA 1 TABLET: 25; 100 TABLET ORAL at 05:46

## 2023-12-30 RX ADMIN — LOSARTAN POTASSIUM 25 MG: 25 TABLET, FILM COATED ORAL at 09:10

## 2023-12-30 NOTE — FLOWSHEET NOTE
Patient assessment complete. Patient is resting in bed at this time with no complaints of pain or discomfort. Patient was able to sit herself up to the side of bed and stand without any hands on assistance. Patient ambulated to restroom with front-wheeled walker/stand-by assistance and then back to bed. Applied Lac-hydrin lotion to BLE per MAR. Patient took Mucinex PO pill separate with a bite of applesauce. Patient then took rest of her evening medications at once with sips of water without difficulty. Patient refused HS snack. Patient states she is happy to be discharged home tomorrow and verbalized no further needs at this time. Bed alarm engaged and call light within reach.

## 2023-12-30 NOTE — PROGRESS NOTES
Pt cleared for discharge, follow up and discharge education provided, pt aware of upcoming appts, mother at bedside to take pt home, pt denied any further needs at this time, pt transported via wheelchair.

## 2023-12-30 NOTE — PROGRESS NOTES
Followup    Patient was seen in her room this morning; she was laying in bed. Her affect was bland, but less anxious than last week. I asked her about her mood, and she said, \"My mood is okay. ..nothing to look forward to\". She is an avid sports fan, and she has been following the [facility]. She said that she had some friends in the area, and I encouraged her to contact them (although it is possible that they drink). I asked her about how she felt about being discharged. She said, \"Glad. ..anxious at the same time\". She said that the \"dry run (at her home) went well\", but she still plans to reside with her mom for now. I raised the question of individual psychotherapy, and she said, \"No, I'll figure it out\". A/P- At this session, she did not indicate that she planned on treatment for substance abuse or 12 Step meetings. Without active treatment or a support group, it is likely that she will relapse. Providers in the community should monitor her for relapse.

## 2023-12-30 NOTE — PROGRESS NOTES
OCCUPATIONAL THERAPY  INPATIENT REHAB TREATMENT NOTE  [facility]      NAME: Vanessa Amanda  : 1970 (52 y.o.)  MRN: [id number]  CODE STATUS: Full Code  Room: K499/M150-14    Date of Service: 2023    Referring Physician: Dr. 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 female presented with recurrent falls and inability to care for self. Recent history of electrolyte abnormalities. MRI of the brain showed results suggestive of osmotic demyelination syndrome otherwise no acute intracranial abnormality. 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 \"  Pain: No pain reported before or after session    Pain at start of treatment: No    Pain at end of treatment: No      OBJECTIVE:     Patient requested to change her shirt and gather items in preparation to get home. Upper Extremity Dressing  Assistance Level: Independent  Skilled Clinical Factors: patient donned and doffed tshirt and jacket/flannel         Functional Mobility  Device: Rolling walker  Activity: Retrieve items;Transport items; To/From bathroom  Assistance Level: Supervision  Skilled Clinical Factors: Patient walked around the room using a rolling walker to retrieve all items she wanted to take home. Patient required verbal cueing to remain inside of the walker while reaching outside of base of support.   Sit to Stand  Assistance Level: Modified independent  Stand to Sit  Assistance Level: Modified independent           Education:  Education  Education

## 2024-01-02 NOTE — PROGRESS NOTES
[facility]   Facility/Department: [facility]  Speech Language Pathology  Discharge Report        Patient: Nancy Jean  : 1970    Date: 2024    Initial Status:  Diet:   Regular with mildly thick liquids  Dysphagia Outcome Severity Scale:  Ratin    Speech Therapy Level of Assistance Scale:  Auditory Comprehension:  Rating: Independent  Verbal Expression:  Rating:Independent  Motor Speech:  Rating: Minimal Assistance  Problem Solving:  Rating: Independent  Memory:  Rating: Independent      Long Term Goals:  Long Term Goals  Time Frame for Long Term Goals: 2-3 weeks  Goal 1: GOal met  Goal 2: Pt will improve her Voice production to supervised assist for effective communication of wants, needs, feelings, ideas, and medical/safety information with familiar and unfamiliar listeners.  Goal 3: Goal met  Long-term Goals  Timeframe for Long-term Goals: 2-3 weeks  Goal 1: Patient will tolerate LRD without overt s/s of aspiration with all po trials.        Patient's Response to Therapy:  Patient has made excellent gains in speech therapy, which targeted swallowing and voice.  Speech therapy is recommended post discharge to continue to address diaphragmatic breathing, voice production, and dysphagia exercises.      Discharge Status:  Diet:   Regular diet with thin liquids, no mixed consistencies  Compensatory Swallowing Strategies : Alternate solids and liquids, Small bites/sips, Eat/Feed slowly, Upright as possible for all oral intake (no mixed consistencies)  Dysphagia Outcome Severity Scale:  Ratin  Voice Production:  Rating: Minimal Assistance        Functional Status at time of Discharge:    Cognition: Patient demonstrates no cognitive deficits.    Language: Patient demonstrates no language deficits.    Motor Speech: Patient demonstrates no motor speech deficits, however moderate dysphonia.    Swallow: Patient demonstrates mild dysphagia.                                 Patient is discharged to Home

## 2024-01-03 NOTE — PROGRESS NOTES
Physical Therapy  Facility/Department: [facility]  Rehabilitation Discharge note    NAME: Nancy Jean  : 1970  MRN: [id number]    Date of discharge: 23        Past Medical History:   Diagnosis Date    Anxiety     Heartburn     Hyperlipidemia     Hypertension     Sleep apnea      Past Surgical History:   Procedure Laterality Date    COLONOSCOPY N/A 2023    COLONOSCOPY DIAGNOSTIC performed by Fabrice Vazquez MD at [facility]    HERNIA REPAIR Right     TOTAL VAGINAL HYSTERECTOMY  2007    UPPER GASTROINTESTINAL ENDOSCOPY N/A 2023    EGD DIAGNOSTIC ONLY performed by Fabrice Vazquez MD at [facility]       Restrictions  Restrictions/Precautions  Restrictions/Precautions: Fall Risk  Position Activity Restriction  Other position/activity restrictions: Pt can have thin liquids-sips. Pt cannot have mixed food/liquid such as cold cereal and milk, chicken noodle soup    Objective    Bed mobility  Rolling to Left: Independent  Rolling to Right: Independent  Supine to Sit: Independent  Sit to Supine: Independent     Transfers  Sit to Stand: Modified independent  Stand to Sit: Modified independent  Bed to Chair: Modified independent  Car Transfer: Modified independent     Ambulation  Surface: Level tile;Uneven;Carpet  Device: Rolling Walker  Quality of Gait: Decreased heel strike  Gait Deviations: Decreased step length;Decreased step height;Shuffles  Distance: 350ft  Comments: Ambulated multiple short distances around therapy gym.  VCs for tapping targets with heel with fair follow through.     Stairs/Curb  Stairs?: Yes  Stairs  # Steps : 12  Stairs Height: 8\"  Rails: Bilateral;Left ascending  Assistance: Supervision  Comment: Supervision 8in steps with 1 rail.  Indep with 6in curb with WW.          Outcomes Measures:  Timed Up and Go: 16 (with ww)       Pt/ family education/training: Pt has been educated in safe mobility. She does require intermittent assistance occasionally  Discussed and

## 2024-01-04 ENCOUNTER — TELEPHONE (OUTPATIENT)
Dept: FAMILY MEDICINE CLINIC | Age: 54
End: 2024-01-04

## 2024-01-04 NOTE — TELEPHONE ENCOUNTER
Patients mom called Hematology / Oncology and stated they are cancelling appt; due to other DX found on hospital visit;     NICOL MEYERS

## 2024-01-08 ENCOUNTER — OFFICE VISIT (OUTPATIENT)
Dept: FAMILY MEDICINE CLINIC | Age: 54
End: 2024-01-08
Payer: OTHER GOVERNMENT

## 2024-01-08 VITALS
RESPIRATION RATE: 12 BRPM | HEIGHT: 67 IN | SYSTOLIC BLOOD PRESSURE: 130 MMHG | BODY MASS INDEX: 24.22 KG/M2 | HEART RATE: 80 BPM | TEMPERATURE: 97 F | WEIGHT: 154.3 LBS | DIASTOLIC BLOOD PRESSURE: 82 MMHG

## 2024-01-08 DIAGNOSIS — N39.41 URGE INCONTINENCE OF URINE: ICD-10-CM

## 2024-01-08 DIAGNOSIS — I10 HYPERTENSION, UNSPECIFIED TYPE: ICD-10-CM

## 2024-01-08 DIAGNOSIS — Z74.09 IMPAIRED MOBILITY AND ADLS: ICD-10-CM

## 2024-01-08 DIAGNOSIS — Z78.9 IMPAIRED MOBILITY AND ADLS: ICD-10-CM

## 2024-01-08 DIAGNOSIS — G37.2 CENTRAL PONTINE MYELINOLYSIS (HCC): ICD-10-CM

## 2024-01-08 DIAGNOSIS — Z78.9 IMPAIRED MOBILITY AND ACTIVITIES OF DAILY LIVING: ICD-10-CM

## 2024-01-08 DIAGNOSIS — Z23 NEEDS FLU SHOT: ICD-10-CM

## 2024-01-08 DIAGNOSIS — R29.6 RECURRENT FALLS: ICD-10-CM

## 2024-01-08 DIAGNOSIS — F10.21 ALCOHOL DEPENDENCE IN REMISSION (HCC): ICD-10-CM

## 2024-01-08 DIAGNOSIS — G37.2: Primary | ICD-10-CM

## 2024-01-08 DIAGNOSIS — Z74.09 IMPAIRED MOBILITY AND ACTIVITIES OF DAILY LIVING: ICD-10-CM

## 2024-01-08 PROBLEM — J32.9 SINUSITIS: Status: RESOLVED | Noted: 2023-12-09 | Resolved: 2024-01-08

## 2024-01-08 PROCEDURE — 3075F SYST BP GE 130 - 139MM HG: CPT | Performed by: FAMILY MEDICINE

## 2024-01-08 PROCEDURE — 90674 CCIIV4 VAC NO PRSV 0.5 ML IM: CPT | Performed by: FAMILY MEDICINE

## 2024-01-08 PROCEDURE — 3079F DIAST BP 80-89 MM HG: CPT | Performed by: FAMILY MEDICINE

## 2024-01-08 PROCEDURE — 90471 IMMUNIZATION ADMIN: CPT | Performed by: FAMILY MEDICINE

## 2024-01-08 PROCEDURE — 99214 OFFICE O/P EST MOD 30 MIN: CPT | Performed by: FAMILY MEDICINE

## 2024-01-08 RX ORDER — OXYBUTYNIN CHLORIDE 5 MG/1
5 TABLET, EXTENDED RELEASE ORAL DAILY
Qty: 90 TABLET | Refills: 1 | Status: CANCELLED | OUTPATIENT
Start: 2024-01-08

## 2024-01-08 RX ORDER — OXYBUTYNIN CHLORIDE 5 MG/1
5 TABLET, EXTENDED RELEASE ORAL DAILY
Qty: 30 TABLET | Refills: 1 | Status: SHIPPED | OUTPATIENT
Start: 2024-01-08

## 2024-01-08 ASSESSMENT — PATIENT HEALTH QUESTIONNAIRE - PHQ9
1. LITTLE INTEREST OR PLEASURE IN DOING THINGS: 0
SUM OF ALL RESPONSES TO PHQ QUESTIONS 1-9: 0
SUM OF ALL RESPONSES TO PHQ9 QUESTIONS 1 & 2: 0
SUM OF ALL RESPONSES TO PHQ QUESTIONS 1-9: 0
SUM OF ALL RESPONSES TO PHQ QUESTIONS 1-9: 0
2. FEELING DOWN, DEPRESSED OR HOPELESS: 0
SUM OF ALL RESPONSES TO PHQ QUESTIONS 1-9: 0

## 2024-01-08 NOTE — PROGRESS NOTES
After obtaining consent, and per orders of Dr. Mccain, injection of flu given in Left deltoid by Priti Muniz CMA (AAMA). Patient instructed to remain in clinic for 20 minutes afterwards, and to report any adverse reaction to me immediately.    Vaccine Information Sheet, \"Influenza - Inactivated\"  given to Nancy Jean, or parent/legal guardian of  Nancy Jean and verbalized understanding.    Patient responses:    Have you ever had a reaction to a flu vaccine? No  Are you able to eat eggs without adverse effects?  Yes  Do you have any current illness?  No  Have you ever had Guillian [place] Syndrome?  No    Flu vaccine given per order. Please see immunization tab.

## 2024-01-08 NOTE — PROGRESS NOTES
Chief Complaint   Patient presents with    Follow-Up from [facility], Physical rehab, discuss immunizations          HPI:  Nancy Jean is a 53 y.o. female     F/u from several hospital stays and rehab stay    Saw Dr. 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    Feeling \"decent\"    PT and ST with [facility]     Her speech has improved significantly per mother, who is here with her    Mobility and strength have improved as well     Patient Active Problem List   Diagnosis    Anxiety    Heartburn    Hyperlipidemia    Hypertension    Sleep apnea    Hyponatremia    Recurrent falls    Alcohol dependence in remission (HCC)    Pain in joint involving ankle and foot    Bilateral primary osteoarthritis of hip    Current smoker    Chronic pain    Dysfunctional uterine bleeding    Gastro-esophageal reflux disease without esophagitis    Instability of ankle    Alcohol dependence (HCC)    Overactive bladder    Panic disorder    Prediabetes    Spondylosis of lumbar spine    Sinusitis    Vitamin D deficiency    Impaired mobility and activities of daily living dt osmotic demyelination syndrome    Osmotic demyelination syndrome (HCC)    Ataxic gait    Dysarthria    Alcohol abuse    Central pontine myelinolysis (HCC)    Impaired mobility and ADLs    Dysphagia, oropharyngeal phase    Urinary incontinence    Parkinsonian features    Hypotension         Current Outpatient Medications   Medication Sig Dispense Refill    Handicap Placard MISC by Does not apply route Exp 5 years 1 each 0    oxyBUTYnin (DITROPAN XL) 5 MG extended release tablet Take 1 tablet by mouth daily 30 tablet 1    carbidopa-levodopa (SINEMET)  MG per tablet Take 1 tablet by mouth 3 times daily 90 tablet 1    zolpidem (AMBIEN) 5 MG tablet Take 1 tablet by mouth nightly as needed for Sleep for up to 28 days. Max Daily Amount: 5 mg 28 tablet 0    melatonin 5 MG TBDP disintegrating tablet Take 2 tablets by mouth

## 2024-01-23 ENCOUNTER — TELEPHONE (OUTPATIENT)
Dept: FAMILY MEDICINE CLINIC | Age: 54
End: 2024-01-23

## 2024-01-26 RX ORDER — TELMISARTAN 40 MG/1
40 TABLET ORAL DAILY
Qty: 90 TABLET | Refills: 1 | Status: SHIPPED | OUTPATIENT
Start: 2024-01-26

## 2024-01-26 NOTE — TELEPHONE ENCOUNTER
Comments: pt will call to schedule appt, pt is also asking for PT outpatient therapy at [facility].    Last Office Visit (last PCP visit):   1/8/2024    Next Visit Date:  Future Appointments   Date Time Provider Department Center   2/28/2024  2:00 PM Nirmal Gimenez MD [facility] Neurology -   3/5/2024 10:20 AM [facility]OM 1 [facility]       **If hasn't been seen in over a year OR hasn't followed up according to last diabetes/ADHD visit, make appointment for patient before sending refill to provider.    Rx requested:  Requested Prescriptions     Pending Prescriptions Disp Refills    telmisartan (MICARDIS) 40 MG tablet 30 tablet      Sig: Take 1 tablet by mouth daily

## 2024-01-26 NOTE — TELEPHONE ENCOUNTER
Comments:     Last Office Visit (last PCP visit):   1/8/2024    Next Visit Date:  No new appt        **If hasn't been seen in over a year OR hasn't followed up according to last diabetes/ADHD visit, make appointment for patient before sending refill to provider.    Rx requested:  Requested Prescriptions     Pending Prescriptions Disp Refills    telmisartan (MICARDIS) 40 MG tablet 30 tablet      Sig: Take 1 tablet by mouth daily

## 2024-02-08 ENCOUNTER — TELEPHONE (OUTPATIENT)
Dept: FAMILY MEDICINE CLINIC | Age: 54
End: 2024-02-08

## 2024-02-08 DIAGNOSIS — Z74.09 IMPAIRED MOBILITY AND ACTIVITIES OF DAILY LIVING: Primary | ICD-10-CM

## 2024-02-08 DIAGNOSIS — Z78.9 IMPAIRED MOBILITY AND ACTIVITIES OF DAILY LIVING: Primary | ICD-10-CM

## 2024-02-08 NOTE — TELEPHONE ENCOUNTER
Pt stopped by for a PT order for [facility] Physical Therapy please and thank you       Pt phone [phone]

## 2024-02-16 PROBLEM — R63.4 ABNORMAL WEIGHT LOSS: Status: ACTIVE | Noted: 2024-02-16

## 2024-02-20 ENCOUNTER — HOSPITAL ENCOUNTER (OUTPATIENT)
Dept: PHYSICAL THERAPY | Age: 54
Setting detail: THERAPIES SERIES
Discharge: HOME | End: 2024-02-20
Payer: OTHER GOVERNMENT

## 2024-02-20 PROCEDURE — 97162 PT EVAL MOD COMPLEX 30 MIN: CPT

## 2024-02-20 PROCEDURE — 97112 NEUROMUSCULAR REEDUCATION: CPT

## 2024-02-20 NOTE — THERAPY EVALUATION
[facility]  PHYSICAL THERAPY PLAN OF CARE                                    [facility] [place] [address]     Ph: [phone]  Fax: [phone]      [x] Certification  [] Recertification []  Plan of Care  [] Progress Note [] Discharge      Referring Provider: Dexter Mccain MD     From:  Heather Cali PT  Patient: Nacny Jean (53 y.o. female) : 1970 Date: 2024  Medical Diagnosis: Impaired mobility and activities of daily living [Z74.09, Z78.9]       Treatment Diagnosis: Impaired mobility    Progress Report Period from:  2024  to 2024    Visits to Date: 1 No Show: 0 Cancelled Appts: 0    OBJECTIVE:   Short Term Goals - Time Frame for Short Term Goals: 2 weeks    Goals Current/Discharge status  Status   Short Term Goal 1: Independent with HEP.  ongoing New     Long Term Goals - Time Frame for Long Term Goals : 6 weeks  Goals Current/ Discharge status Status   Long Term Goal 1: Improve ronald LE strength to >/= 4+/5 to improve stability with standing and walking. Strength LLE  L Hip Flexion: 4/5  L Hip ABduction: 3+/5  L Knee Flexion: 4+/5  L Knee Extension: 5/5  L Ankle Dorsiflexion: 5/5  Strength RLE  R Hip Flexion: 4+/5  R Hip ABduction: 3+/5  R Knee Flexion: 4+/5  R Knee Extension: 5/5  R Ankle Dorsiflexion: 5/5    New   Long Term Goal 2: Pt will ambulate on even and uneven surfaces >/= 250' with improved trunk rotation and push off and good stability S/I. Ambulation  Surface: Carpet  Device: No Device  Assistance: Independent  Quality of Gait: Decreased trunk rotation, decreased ronald push off  Gait Deviations: Slow Ilana, Decreased step length, Decreased step height, Decreased head and trunk rotation  Distance: 80'   New   Long Term Goal 3: Mac >/= 52/56 and DGI >/= 21/24 to reduce pt's risk for falls. Mac Balance Score: 47  Dynamic Gait Total Score: 17 New   Long Term Goal 4: Pt will ambulate with dual tasking with minimal to no deviations. NT New       Body

## 2024-02-20 NOTE — PROGRESS NOTES
Normal/bedrest/immobile 20  10  0 10   Mental Status [] Forgets limitations  [x] Oriented to own ability 15  0 0      Total:35     Based on the Assessment score: check the appropriate box.  []  No intervention needed   Low =   Score of 0-24  [x]  Use standard prevention interventions Moderate =  Score of 24-44   [x] Discuss fall prevention strategies   [x] Indicate moderate falls risk on eval  []  Use high risk prevention interventions High = Score of 45 and higher   [] Discuss fall prevention strategies   [] Provide supervision during treatment time      Minutes:  PT Individual Minutes  Time In: 0905  Time Out: 0938  Minutes: 33  Timed Code Treatment Minutes: 14 Minutes  Procedure Minutes: 19' eval     Timed Activity Minutes Units   Ther Ex 5 0   Neuro polo 9 1       Electronically signed by Heather Cali PT on 2/20/24 at 12:53 PM EST

## 2024-02-27 ENCOUNTER — HOSPITAL ENCOUNTER (OUTPATIENT)
Dept: PHYSICAL THERAPY | Age: 54
Setting detail: THERAPIES SERIES
Discharge: HOME | End: 2024-02-27
Payer: OTHER GOVERNMENT

## 2024-02-27 PROCEDURE — 97112 NEUROMUSCULAR REEDUCATION: CPT

## 2024-02-27 PROCEDURE — 97116 GAIT TRAINING THERAPY: CPT

## 2024-02-27 NOTE — PROGRESS NOTES
Prognosis: Good    Post-Pain Assessment:       Pain Rating (0-10 pain scale):   0/10   Location and pain description same as pre-treatment unless indicated.   Action: [x] NA   [] Perform HEP  [] Meds as prescribed  [] Modalities as prescribed   [] Call Physician     GOALS   Patient Goal(s):      Short Term Goals Completed by 2 weeks Goal Status   STG 1 Independent with HEP. In progress     Long Term Goals Completed by 6 weeks Goal Status   LTG 1 Improve ronald LE strength to >/= 4+/5 to improve stability with standing and walking. In progress   LTG 2 Pt will ambulate on even and uneven surfaces >/= 250' with improved trunk rotation and push off and good stability S/I. In progress   LTG 3 Mac >/= 52/56 and DGI >/= 21/24 to reduce pt's risk for falls. In progress   LTG 4 Pt will ambulate with dual tasking with minimal to no deviations. In progress     Plan:  Frequency/Duration:  Plan  Plan Frequency: 1-2  Plan weeks: 6  Current Treatment Recommendations: Strengthening, ROM, Balance training, Functional mobility training, Transfer training, Gait training, Stair training, Neuromuscular re-education, Manual, Home exercise program, Safety education & training, Patient/Caregiver education & training, Equipment evaluation, education, & procurement, Modalities, Group Therapy  Modalities: Heat/Cold, E-stim - unattended  Pt to continue current HEP.  See objective section for any therapeutic exercise changes, additions or modifications this date.    Therapy Time:      PT Individual Minutes  Time In: 1100  Time Out: 1153  Minutes: 53  Timed Code Treatment Minutes: 53 Minutes  Procedure Minutes:0  Timed Activity Minutes Units   Neuro  35 3   Manual  18 1     Electronically signed by Porsche Silverio PTA on 2/27/24 at 12:05 PM EST

## 2024-02-28 ENCOUNTER — OFFICE VISIT (OUTPATIENT)
Dept: NEUROLOGY | Age: 54
End: 2024-02-28
Payer: OTHER GOVERNMENT

## 2024-02-28 VITALS — SYSTOLIC BLOOD PRESSURE: 120 MMHG | BODY MASS INDEX: 24.43 KG/M2 | DIASTOLIC BLOOD PRESSURE: 62 MMHG | WEIGHT: 156 LBS

## 2024-02-28 DIAGNOSIS — G37.2 CENTRAL PONTINE MYELINOLYSIS (HCC): ICD-10-CM

## 2024-02-28 DIAGNOSIS — F10.21 ALCOHOL DEPENDENCE IN REMISSION (HCC): ICD-10-CM

## 2024-02-28 DIAGNOSIS — G21.8 OTHER SECONDARY PARKINSONISM (HCC): ICD-10-CM

## 2024-02-28 DIAGNOSIS — R29.6 RECURRENT FALLS: ICD-10-CM

## 2024-02-28 DIAGNOSIS — G37.2: Primary | ICD-10-CM

## 2024-02-28 PROCEDURE — 3074F SYST BP LT 130 MM HG: CPT | Performed by: PSYCHIATRY & NEUROLOGY

## 2024-02-28 PROCEDURE — 99214 OFFICE O/P EST MOD 30 MIN: CPT | Performed by: PSYCHIATRY & NEUROLOGY

## 2024-02-28 PROCEDURE — 3078F DIAST BP <80 MM HG: CPT | Performed by: PSYCHIATRY & NEUROLOGY

## 2024-02-28 ASSESSMENT — ENCOUNTER SYMPTOMS
VOMITING: 0
COLOR CHANGE: 0
TROUBLE SWALLOWING: 0
NAUSEA: 0
SHORTNESS OF BREATH: 0
CHOKING: 0
BACK PAIN: 0
PHOTOPHOBIA: 0

## 2024-03-05 ENCOUNTER — HOSPITAL ENCOUNTER (OUTPATIENT)
Dept: WOMENS IMAGING | Age: 54
Discharge: HOME | End: 2024-03-07
Payer: OTHER GOVERNMENT

## 2024-03-05 DIAGNOSIS — Z12.31 ENCOUNTER FOR SCREENING MAMMOGRAM FOR MALIGNANT NEOPLASM OF BREAST: ICD-10-CM

## 2024-03-05 PROCEDURE — 77063 BREAST TOMOSYNTHESIS BI: CPT

## 2024-03-06 ENCOUNTER — HOSPITAL ENCOUNTER (OUTPATIENT)
Dept: PHYSICAL THERAPY | Age: 54
Setting detail: THERAPIES SERIES
Discharge: HOME | End: 2024-03-06
Payer: OTHER GOVERNMENT

## 2024-03-06 PROCEDURE — 97112 NEUROMUSCULAR REEDUCATION: CPT

## 2024-03-06 PROCEDURE — 97110 THERAPEUTIC EXERCISES: CPT

## 2024-03-06 ASSESSMENT — PAIN DESCRIPTION - DESCRIPTORS: DESCRIPTORS: ACHING

## 2024-03-06 ASSESSMENT — PAIN DESCRIPTION - ORIENTATION: ORIENTATION: LOWER;RIGHT;LEFT

## 2024-03-06 ASSESSMENT — PAIN DESCRIPTION - LOCATION: LOCATION: BACK;HIP

## 2024-03-06 NOTE — PROGRESS NOTES
[facility]  Outpatient Physical Therapy    Treatment Note        Date: 3/6/2024  Patient: Nancy Jean  : 1970   Confirmed: Yes  MRN: [id number]  Referring Provider: Dexter Mccain MD    Medical Diagnosis: Impaired mobility and activities of daily living [Z74.09, Z78.9]       Treatment Diagnosis: Impaired mobility    Visit Information:  Insurance: Payor:  EAST / Plan: [id number] / Product Type: *No Product type* /   PT Visit Information  Onset Date: 23  PT Insurance Information:  East  Total # of Visits Approved:  ($50)  Total # of Visits to Date: 3  No Show: 0  Canceled Appointment: 0  Progress Note Counter: 3/6-    Subjective Information:  Subjective: Patient states she has been waking up after sleeping for a couple hours with joint pain (shoulders/hips/ankles/back). States she has to get up and stretch/move \"joints around\". She gets releif however unhappy with lack of sleep.  HEP Compliance:  [x] Good  Fair [] Poor [] Reports not doing due to:             []  Pain Screening  Patient Currently in Pain: Yes  Pain Assessment: 0-10  Pain Location: Back, Hip  Pain Orientation: Lower, Right, Left  Pain Descriptors: Aching    Treatment:  Exercises:  Exercises  Exercise 1: Single stepping forward/lateral x10 B large gauri  Exercise 2: Foam: step ups, NBOS, foam step tap on cone alt le's 0-1 intermit steadying self  Exercise 3: Rockerboard 3 way x30 ea  Exercise 4: Gait drills: cross overs 0-1 ue support  Exercise 5: resisted trink rotation YTB standing x20  Exercise 8: cable column walkouts 4 way 2 plates x3 ea  Exercise 9: 4\" step up / step down with opp march x10 B with 2# ankle weights  Exercise 10: 0-1 ue support for  marvinu lunge for balance and ankle stability x10x2 ble  Exercise 20: HEP:sidelying hip abd, single stepping, FT, seated trunk rot with reach  Treatment Reasoning  Limitations addressed: Mobility, Strength, Coordination, Balance, Proprioception, 
Posture  Therapist provided: Verbal cuing  Progressed: Repetitions, Resistance    *Indicates exercise, modality, or manual techniques to be initiated when appropriate    Objective Measures:         Hobbs 53/56   DGI 17/20     Assessment:   Body Structures, Functions, Activity Limitations Requiring Skilled Therapeutic Intervention: Decreased functional mobility , Decreased strength, Decreased balance, Decreased coordination  Assessment: Patient demonstrated progress in static and dynamic balance this session, +7 points on hobbs vs last tested and +1 point on DGI. Blocked practice for all balance tasks yeilded improved ability this session. Intermit cues for full range with resisted trunk rotation. Close sba with cable column walk out for safety. Patient would benefit from cont skilled therapy to return to plof.  Treatment Diagnosis: Impaired mobility  Therapy Prognosis: Good        Post-Pain Assessment:       Pain Rating (0-10 pain scale):  0 /10   Location and pain description same as pre-treatment unless indicated.   Action: [x] NA   [] Perform HEP  [] Meds as prescribed  [] Modalities as prescribed   [] Call Physician     GOALS   Patient Goal(s):      Short Term Goals Completed by 2 weeks Goal Status   STG 1 Independent with HEP. In progress   STG 2       STG 3       STG 4       STG 5           Long Term Goals Completed by 6 weeks Goal Status   LTG 1 Improve ronald LE strength to >/= 4+/5 to improve stability with standing and walking. In progress   LTG 2 Pt will ambulate on even and uneven surfaces >/= 250' with improved trunk rotation and push off and good stability S/I. In progress   LTG 3 Hobbs >/= 52/56 and DGI >/= 21/24 to reduce pt's risk for falls. Met   LTG 4 Pt will ambulate with dual tasking with minimal to no deviations. In progress   LTG 5       LTG 6       LTG 7       LTG 8       LTG 9       LTG 10                Plan:  Frequency/Duration:  Plan  Plan Frequency: 1-2  Plan weeks: 6  Current Treatment

## 2024-03-13 ENCOUNTER — HOSPITAL ENCOUNTER (OUTPATIENT)
Dept: PHYSICAL THERAPY | Age: 54
Setting detail: THERAPIES SERIES
Discharge: HOME | End: 2024-03-13
Payer: OTHER GOVERNMENT

## 2024-03-13 PROCEDURE — 97116 GAIT TRAINING THERAPY: CPT

## 2024-03-13 PROCEDURE — 97112 NEUROMUSCULAR REEDUCATION: CPT

## 2024-03-13 ASSESSMENT — PAIN DESCRIPTION - DESCRIPTORS: DESCRIPTORS: ACHING

## 2024-03-13 ASSESSMENT — PAIN DESCRIPTION - LOCATION: LOCATION: BACK

## 2024-03-13 ASSESSMENT — PAIN DESCRIPTION - ORIENTATION: ORIENTATION: LOWER

## 2024-03-13 ASSESSMENT — PAIN SCALES - GENERAL: PAINLEVEL_OUTOF10: 4

## 2024-03-13 NOTE — THERAPY DISCHARGE
Functions, Activity Limitations Requiring Skilled Therapeutic Intervention: Decreased functional mobility , Decreased strength, Decreased balance, Decreased coordination  Assessment: Patient nearly meeting all goals this date. States she has returned to doing all off her normal activities at home. Request to d/c due to high copay. Continued to focus on dynamic balance on/off uneven surfaces and dual tasking. Patient demonstrates 2 small LOB however was able to self correct.  Discussed continuing to work on heel strike and push off at home to assist with normalizing gait. Patient verbalizes good understanding. Feels confident to continue HEP independently.  Therapy Prognosis: Good      PLAN: [] Evaluate and Treat  Frequency/Duration:  Plan Frequency: 1-2  Plan weeks: 6  Current Treatment Recommendations: Strengthening, ROM, Balance training, Functional mobility training, Transfer training, Gait training, Stair training, Neuromuscular re-education, Manual, Home exercise program, Safety education & training, Patient/Caregiver education & training, Equipment evaluation, education, & procurement, Modalities, Group Therapy  Modalities: Heat/Cold, E-stim - unattended     Precautions:                            Patient Status:[] Continue/ Initiate plan of Care    [x] Discharge PT.  Recommend pt continue with HEP.     [] Additional visits requested, Please re-certify for additional visits:    [] Hold         Signature: Electronically signed by Porsche Silverio PTA on 3/13/24 at 11:38 AM EDT      If you have any questions or concerns, please don't hesitate to call.  Thank you for your referral.    I have reviewed this plan of care and certify a need for medically necessary rehabilitation services.    Physician Signature:__________________________________________________________  Date:  Please sign and return

## 2024-03-13 NOTE — PROGRESS NOTES
[facility]  Outpatient Physical Therapy    Treatment Note        Date: 3/13/2024  Patient: Nanyc Jean  : 1970   Confirmed: Yes  MRN: [id number]  Referring Provider: Dexter Mccain MD    Medical Diagnosis: Impaired mobility and activities of daily living [Z74.09, Z78.9]       Treatment Diagnosis: Impaired mobility    Visit Information:  Insurance: Payor:  EAST / Plan: Streak / Product Type: *No Product type* /   PT Visit Information  Onset Date: 23  PT Insurance Information:  East  Total # of Visits Approved:  ($50)  Total # of Visits to Date: 4  No Show: 0  Canceled Appointment: 0  Progress Note Counter:     Subjective Information:  Subjective: Patient reports she was able to lift 40lb bags of soil while doing yard work yesterday. Also notes she took a shower without using chair for the first time this week. Continues to c/o joint stiffness however feels her walking and balance have improved. request to D/C this date due to high copay.  HEP Compliance:  [x] Good [] Fair [] Poor [] Reports not doing due to:    Pain Screening  Patient Currently in Pain: Yes  Pain Assessment: 0-10  Pain Level: 4  Pain Location: Back  Pain Orientation: Lower  Pain Descriptors: Aching    Treatment:  Exercises:  Exercises  Exercise 1: resisted single steps forward with opp UE reach  Exercise 2: Foam: eyes closed, single steps over gauri  Exercise 4: Gait drills: cross crawls, march with opp UE reach  Exercise 5: LTR/open book 5\"x10  Exercise 6: ambulation  dribbling Pball naming colors/counting, single step with ball toss/catch  Exercise 7: resisted lateral steps over hurdlex x10 B  Exercise 8: 6\" step up with ball lift  Exercise 20: HEP:sidelying hip abd, single stepping, FT, seated trunk rot with reach  Treatment Reasoning  Limitations addressed: Mobility, Strength, Coordination, Balance, Proprioception, Posture    *Indicates exercise, modality, or manual techniques to be

## 2024-03-19 DIAGNOSIS — R92.8 ABNORMAL MAMMOGRAM: Primary | ICD-10-CM

## 2024-03-25 ENCOUNTER — HOSPITAL ENCOUNTER (OUTPATIENT)
Dept: WOMENS IMAGING | Age: 54
Discharge: HOME | End: 2024-03-27
Payer: OTHER GOVERNMENT

## 2024-03-25 ENCOUNTER — HOSPITAL ENCOUNTER (OUTPATIENT)
Dept: ULTRASOUND IMAGING | Age: 54
Discharge: HOME | End: 2024-03-27
Payer: OTHER GOVERNMENT

## 2024-03-25 DIAGNOSIS — R92.8 ABNORMAL MAMMOGRAM: ICD-10-CM

## 2024-03-25 DIAGNOSIS — R92.8 ABNORMAL FINDINGS ON DIAGNOSTIC IMAGING OF BREAST: Primary | ICD-10-CM

## 2024-03-25 PROCEDURE — G0279 TOMOSYNTHESIS, MAMMO: HCPCS

## 2024-03-25 PROCEDURE — 76642 ULTRASOUND BREAST LIMITED: CPT

## 2024-03-28 ENCOUNTER — TELEPHONE (OUTPATIENT)
Dept: WOMENS IMAGING | Age: 54
End: 2024-03-28

## 2024-03-28 DIAGNOSIS — R92.8 ABNORMAL MAMMOGRAM: Primary | ICD-10-CM

## 2024-03-28 NOTE — TELEPHONE ENCOUNTER
03/28/24  Spoke with patient on phone regarding her abnormal mammogram on 3/25/24. Reviewed results and also reviewed biopsy procedure. Scheduled biopsy of left breast for 4/4/24. Patient has no questions at this time. Encouraged to call with any questions or concerns.

## 2024-04-04 ENCOUNTER — HOSPITAL ENCOUNTER (OUTPATIENT)
Dept: WOMENS IMAGING | Age: 54
Discharge: HOME | End: 2024-04-06
Payer: OTHER GOVERNMENT

## 2024-04-04 VITALS — SYSTOLIC BLOOD PRESSURE: 97 MMHG | DIASTOLIC BLOOD PRESSURE: 61 MMHG | HEART RATE: 52 BPM | RESPIRATION RATE: 20 BRPM

## 2024-04-04 DIAGNOSIS — R92.8 ABNORMAL MAMMOGRAM: ICD-10-CM

## 2024-04-04 PROCEDURE — 88305 TISSUE EXAM BY PATHOLOGIST: CPT

## 2024-04-04 PROCEDURE — A4217 STERILE WATER/SALINE, 500 ML: HCPCS | Performed by: RADIOLOGY

## 2024-04-04 PROCEDURE — A4648 IMPLANTABLE TISSUE MARKER: HCPCS

## 2024-04-04 PROCEDURE — 2500000003 HC RX 250 WO HCPCS: Performed by: RADIOLOGY

## 2024-04-04 PROCEDURE — 2580000003 HC RX 258: Performed by: RADIOLOGY

## 2024-04-04 PROCEDURE — 76098 X-RAY EXAM SURGICAL SPECIMEN: CPT

## 2024-04-04 PROCEDURE — 77065 DX MAMMO INCL CAD UNI: CPT

## 2024-04-04 RX ORDER — LIDOCAINE HYDROCHLORIDE 20 MG/ML
20 INJECTION, SOLUTION INFILTRATION; PERINEURAL ONCE
Status: COMPLETED | OUTPATIENT
Start: 2024-04-04 | End: 2024-04-04

## 2024-04-04 RX ORDER — MAGNESIUM HYDROXIDE 1200 MG/15ML
LIQUID ORAL CONTINUOUS PRN
Status: COMPLETED | OUTPATIENT
Start: 2024-04-04 | End: 2024-04-04

## 2024-04-04 RX ORDER — LIDOCAINE HYDROCHLORIDE AND EPINEPHRINE 10; 10 MG/ML; UG/ML
20 INJECTION, SOLUTION INFILTRATION; PERINEURAL ONCE
Status: COMPLETED | OUTPATIENT
Start: 2024-04-04 | End: 2024-04-04

## 2024-04-04 RX ORDER — MAGNESIUM HYDROXIDE 1200 MG/15ML
250 LIQUID ORAL CONTINUOUS
Status: DISCONTINUED | OUTPATIENT
Start: 2024-04-04 | End: 2024-04-07 | Stop reason: HOSPADM

## 2024-04-04 RX ADMIN — SODIUM CHLORIDE 250 ML: 900 IRRIGANT IRRIGATION at 11:25

## 2024-04-04 RX ADMIN — LIDOCAINE HYDROCHLORIDE,EPINEPHRINE BITARTRATE 5 ML: 10; .01 INJECTION, SOLUTION INFILTRATION; PERINEURAL at 11:35

## 2024-04-04 RX ADMIN — LIDOCAINE HYDROCHLORIDE 4 ML: 20 INJECTION, SOLUTION INFILTRATION; PERINEURAL at 11:34

## 2024-04-04 ASSESSMENT — PAIN SCALES - GENERAL
PAINLEVEL_OUTOF10: 0
PAINLEVEL_OUTOF10: 0

## 2024-04-04 NOTE — SEDATION DOCUMENTATION
Stereotactic Percutaneous Biopsy of the Left Breast      1055 Patient ambulated, gait steady, into [facility] pre procedure room. Reviewed procedure with patient and patient verbalizes understanding.  Consent obtained. Allergies, history, and medications reviewed. Vitals signs taken, VSS.    1120 Patient assisted into mammography room and into stereotactic biopsy chair. Technologist taking images.    1127 Timeout Completed.      1127 Dr. Gates  talking with patient and looking at images. Area of concern to left  breast located.     Area cleansed with chloraprep x 3 and allowed to dry for 3 minutes.     1134 Dr. Gates injected lidocaine 2% 4ml into left breast to numb area, see eMar.     1135 Using 22G spinal needle, Dr. Gates  injected lidocaine 1% with epi  5 ml deeper into biopsy site, see eMar.     Using scalpel, small incision made at site by Dr. Gates    Needle Guide Eviva [id number] lot [id number] exp 07/19/2025 used during procedure.      Samples of tissue obtained using Eviva 0913-20 lot [id number] exp 12/21/2025    Tissue collected from left breast at 1138 and onto grid, marked left breast  Compression held to site. Images of specimen taken to check for calcifications present. Calcifications were present.    Dr. Gates  then inserted SMARK Eviva 2S-13 lot [id number] exp 09/12/2024.      Compression held to site for 5 min  minutes. Site soft, no bleeding. Steri strips, gauze, and tegaderm applied. Procedure complete. Pt tolerated well.    Post biopsy mammogram order per verbal order from Dr. Gates    Specimen orders placed per [facility] protocol.     Vitals taken, VSS.  Assisted patient out of stereotactic chair and into consultation room. Steady gait.     1210 Post bx mamm completed.    1225 Discharge instructions reviewed with patient and patient verbalizes understanding. Biopsy site soft. Dressing clean, dry, and intact. Chest area wrapped in ace wrap. Ice packs given for home. Denies any pain. Patient left [facility] with steady gait.

## 2024-04-04 NOTE — DISCHARGE INSTRUCTIONS
Post Procedure Instructions for Breast Biopsies    You have had a breast biopsy of the Left  Breast at [facility] in the [facility], which was ordered by Dr. AUBRIE Gimenez    Activity  You may return to work or other activities the day of your biopsy, providing these activites do not require any heavy lifting or strenuous activity. We do recommend that you take the rest of the day following your biopsy just to rest.    Diet  You may resume your normal diet    Medications  1. Continue taking your normal medications.  2. You may take a mild pain reliever, as needed, such as Tylenol (Acetaminophen), Advil (Ibuprofen) or Motrin    Dressing  1. May remove ace wrap in the morning and take a shower. Pat the dressing dry. Then put on snug fitting bra.  2. Keep the ice pack on for 15 minutes at least 2 times today  3. You may shower and pat the dressing dry tomorrow.   On Saturday  you may remove the dressing. The biopsy site should have started to heal at this point. At your discretion you can put a band-aid on it.    Other Instructions  1. You may have some bruising following the biopsy, that is normal because of the compression and it will heal and go away  2. For severyal days or even a couple of weeks, you may feel mild tenderness, \"twinges\", or even a small bump at the biopsy site. This is normal. Applying moist heat to the area may bring relief. This will disappear with time    If you develop any of the following symptoms, please contact your referring physician.  1. Active bleeding-hold compression to the site. If it does not stop call the radiology department  2. If you develop redness or heat at the biopsy site or a fever 5-7 days following your biopsy this could be a sign of an early infection    Physician performing exam: Dr. Gates    Please keep follow up appointment with Dr. Gimenez on April 17th at 1:45PM at the [facility] on [address]    [phone]    Your [facility]

## 2024-04-17 ENCOUNTER — OFFICE VISIT (OUTPATIENT)
Dept: SURGERY | Age: 54
End: 2024-04-17
Payer: OTHER GOVERNMENT

## 2024-04-17 VITALS
HEIGHT: 67 IN | OXYGEN SATURATION: 96 % | SYSTOLIC BLOOD PRESSURE: 106 MMHG | TEMPERATURE: 97.4 F | BODY MASS INDEX: 25.39 KG/M2 | HEART RATE: 66 BPM | WEIGHT: 161.8 LBS | RESPIRATION RATE: 12 BRPM | DIASTOLIC BLOOD PRESSURE: 62 MMHG

## 2024-04-17 DIAGNOSIS — E66.3 OVERWEIGHT (BMI 25.0-29.9): ICD-10-CM

## 2024-04-17 DIAGNOSIS — N60.12 FIBROCYSTIC BREAST CHANGES, BILATERAL: Primary | ICD-10-CM

## 2024-04-17 DIAGNOSIS — Z12.31 ENCOUNTER FOR SCREENING MAMMOGRAM FOR BREAST CANCER: ICD-10-CM

## 2024-04-17 DIAGNOSIS — N60.11 FIBROCYSTIC BREAST CHANGES, BILATERAL: Primary | ICD-10-CM

## 2024-04-17 PROCEDURE — 3074F SYST BP LT 130 MM HG: CPT | Performed by: SURGERY

## 2024-04-17 PROCEDURE — 99203 OFFICE O/P NEW LOW 30 MIN: CPT | Performed by: SURGERY

## 2024-04-17 PROCEDURE — 3078F DIAST BP <80 MM HG: CPT | Performed by: SURGERY

## 2024-04-17 ASSESSMENT — ENCOUNTER SYMPTOMS
NAUSEA: 0
SHORTNESS OF BREATH: 0
ABDOMINAL PAIN: 0
COLOR CHANGE: 0
CHEST TIGHTNESS: 0
COUGH: 0
VOMITING: 0
SORE THROAT: 0

## 2024-04-17 NOTE — PROGRESS NOTES
NEW BREAST PATIENT         SERVICE DATE: 4/17/24  SERVICE TIME:  1:36 PM EDT    REFERRED BY:  Dexter Mccain MD  REASON FOR TODAY'S VISIT:    Chief Complaint   Patient presents with    New Patient    Abnormal Mammogram     Left Breast BIRADS 4, stereotactic biopsy completed 4/4/2024, incision healed with no concerns,does self breast exams, did not feel this area prior to the biopsy, denies breast pain, nipple drainage/retraction bilateral breasts      CHAPERONE WAS OFFERED, PATIENT RESPONDED: no    HISTORY AND CHIEF COMPLAINT:  Nancy Jean is a 53 y.o.  female who is here for a New Patient and Abnormal Mammogram (Left Breast BIRADS 4, stereotactic biopsy completed 4/4/2024, incision healed with no concerns,does self breast exams, did not feel this area prior to the biopsy, denies breast pain, nipple drainage/retraction bilateral breasts)  Biopsy results were benign and concordant      BREAST HISTORY  Her past breast history (prior to this encounter) is as follows:  Abnormal mammogram:   Yes, Left Breast BIRADS 4  Abnormal Breast US:  Yes, Left Breast   Breast biopsy:    Yes, Left Breast stereotactic biopsy completed 4/4/2024  Breast cysts:    No  Breast surgery:    No  Breast cancer              No  History of Breastfeeding: No   Currently Breastfeeding: No      RISK FACTORS FOR BREAST CANCER:  Family History of Breast Cancer: Yes, significant for Maternal Grandmother Dx in her 60's .  History of ovarian cancer: no  Ashkenazi Ancestry: no  Age at the birth of first child: N/a  Age at the onset of menses: 12-13  Age at menopause: complete Hysterectomy 2007   Hormonal therapy: yes - 4 years until 2011  Postmenopausal obesity: BMI 25.34    BRA SIZE: sports style    Past Medical History:   Diagnosis Date    Anxiety     Heartburn     Hyperlipidemia     Hypertension     Sleep apnea      Past Surgical History:   Procedure Laterality Date    COLONOSCOPY N/A 01/12/2023    COLONOSCOPY DIAGNOSTIC performed by Fabrice MASON

## 2024-04-26 NOTE — PROGRESS NOTES
Patient alert  and oriented pleasant and cooperative denies any pain or discomfort at this time. Up ambulated to bathroom gait shuffled and unsteady at times safety reinforced. Appetite good taking po fluids well. c/w home Biktarvy  - dc'd atovoquone  - f/u viral load

## 2024-08-15 PROBLEM — R53.1 WEAKNESS: Status: ACTIVE | Noted: 2024-08-15

## 2024-08-15 PROBLEM — Z79.899 OTHER LONG TERM (CURRENT) DRUG THERAPY: Status: ACTIVE | Noted: 2024-08-15

## 2024-08-15 PROBLEM — Z71.82 ENCOUNTER FOR EXERCISE COUNSELING: Status: ACTIVE | Noted: 2024-08-15

## 2024-08-28 ENCOUNTER — PATIENT MESSAGE (OUTPATIENT)
Dept: NEUROLOGY | Age: 54
End: 2024-08-28

## 2024-08-28 ENCOUNTER — OFFICE VISIT (OUTPATIENT)
Dept: NEUROLOGY | Age: 54
End: 2024-08-28
Payer: OTHER GOVERNMENT

## 2024-08-28 VITALS
BODY MASS INDEX: 26.93 KG/M2 | DIASTOLIC BLOOD PRESSURE: 70 MMHG | HEART RATE: 60 BPM | WEIGHT: 172 LBS | SYSTOLIC BLOOD PRESSURE: 120 MMHG

## 2024-08-28 DIAGNOSIS — G37.2: Primary | ICD-10-CM

## 2024-08-28 DIAGNOSIS — G21.8 OTHER SECONDARY PARKINSONISM (HCC): ICD-10-CM

## 2024-08-28 DIAGNOSIS — F10.21 ALCOHOL DEPENDENCE IN REMISSION (HCC): ICD-10-CM

## 2024-08-28 DIAGNOSIS — G37.2 CENTRAL PONTINE MYELINOLYSIS (HCC): ICD-10-CM

## 2024-08-28 PROCEDURE — 3078F DIAST BP <80 MM HG: CPT | Performed by: PSYCHIATRY & NEUROLOGY

## 2024-08-28 PROCEDURE — 99213 OFFICE O/P EST LOW 20 MIN: CPT | Performed by: PSYCHIATRY & NEUROLOGY

## 2024-08-28 PROCEDURE — 3074F SYST BP LT 130 MM HG: CPT | Performed by: PSYCHIATRY & NEUROLOGY

## 2024-08-28 NOTE — PROGRESS NOTES
MCH 33.8 12/29/2023 03:16 AM    MCHC 33.2 12/29/2023 03:16 AM    RDW 12.1 12/29/2023 03:16 AM     12/29/2023 03:16 AM     Lab Results   Component Value Date/Time     12/29/2023 03:16 AM    K 4.6 12/29/2023 03:16 AM     12/29/2023 03:16 AM    CO2 23 12/29/2023 03:16 AM    BUN 9 12/29/2023 03:16 AM    CREATININE 0.56 12/29/2023 03:16 AM    GFRAA >60.0 08/17/2022 06:45 PM    LABGLOM >60.0 12/29/2023 03:16 AM    GLUCOSE 110 12/29/2023 03:16 AM    CALCIUM 9.4 12/29/2023 03:16 AM    BILITOT 0.3 12/08/2023 09:30 AM    ALKPHOS 70 12/08/2023 09:30 AM    AST 22 12/08/2023 09:30 AM    ALT 24 12/08/2023 09:30 AM     Lab Results   Component Value Date/Time    PROTIME 12.0 11/24/2023 05:26 PM    INR 0.8 11/24/2023 05:26 PM     Lab Results   Component Value Date/Time    TSH 2.500 12/04/2023 11:55 AM    YKAUDSOW72 550 12/04/2023 11:55 AM     No results found for: \"TRIG\", \"HDL\", \"LDLDIRECT\"  Lab Results   Component Value Date/Time    ETOH <10 11/24/2023 05:27 PM     No results found for: \"LITHIUM\", \"DILFRTOT\", \"VALPROATE\"    Assessment:       Diagnosis Orders   1. Osmotic demyelination syndrome (HCC)        2. Central pontine myelinolysis (HCC)        3. Other secondary parkinsonism (HCC)        4. Alcohol dependence in remission (HCC)        Osmotic demyelination syndrome with a very large CPAM when patient was almost completely paralyzed and also had parkinsonian features.  Patient was in imitation and we had a dopamine trial and she has responded to this and we have her on carbidopa levodopa 3 times a day.  She is doing much better in rehab herself very well now and is not ataxic.  She has no major memory issues either.  Patient had hyponatremia when this all occurred.    Patient would like to decrease the carbidopa levodopa which we can and see how she does.    Nirmal Gimenez MD, LELSY  Diplomate, American Board of Psychiatry & Neurology  Board Certified in Vascular Neurology  Board Certified in

## 2025-02-26 ENCOUNTER — OFFICE VISIT (OUTPATIENT)
Dept: NEUROLOGY | Age: 55
End: 2025-02-26
Payer: OTHER GOVERNMENT

## 2025-02-26 VITALS
SYSTOLIC BLOOD PRESSURE: 110 MMHG | HEART RATE: 57 BPM | WEIGHT: 187 LBS | BODY MASS INDEX: 29.28 KG/M2 | DIASTOLIC BLOOD PRESSURE: 76 MMHG

## 2025-02-26 DIAGNOSIS — R47.1 DYSARTHRIA: ICD-10-CM

## 2025-02-26 DIAGNOSIS — G21.8 OTHER SECONDARY PARKINSONISM (HCC): Primary | ICD-10-CM

## 2025-02-26 DIAGNOSIS — F10.21 ALCOHOL DEPENDENCE IN REMISSION (HCC): ICD-10-CM

## 2025-02-26 DIAGNOSIS — R26.0 ATAXIC GAIT: ICD-10-CM

## 2025-02-26 DIAGNOSIS — G37.2: ICD-10-CM

## 2025-02-26 PROCEDURE — 99214 OFFICE O/P EST MOD 30 MIN: CPT | Performed by: PSYCHIATRY & NEUROLOGY

## 2025-02-26 PROCEDURE — 3078F DIAST BP <80 MM HG: CPT | Performed by: PSYCHIATRY & NEUROLOGY

## 2025-02-26 PROCEDURE — 3074F SYST BP LT 130 MM HG: CPT | Performed by: PSYCHIATRY & NEUROLOGY

## 2025-02-26 NOTE — PROGRESS NOTES
significant osmotic demyelination syndrome from which patient has made good recovery.  She does not consume alcohol anymore.  Will see her in a few months and earlier with any concern    Nirmal Gimenez MD, LESLY  Diplomate, American Board of Psychiatry & Neurology  Board Certified in Vascular Neurology  Board Certified in Neuromuscular Medicine  Certified in Neurorehabilitation         Plan:      Orders Placed This Encounter   Procedures    Acetylcholine Receptor, Binding     Standing Status:   Future     Standing Expiration Date:   2/26/2026    Acetylcholine Receptor, Modulating     Standing Status:   Future     Standing Expiration Date:   2/26/2026    Acetylcholine Receptor, Blocking     Standing Status:   Future     Standing Expiration Date:   2/26/2026     No orders of the defined types were placed in this encounter.      No follow-ups on file.      Nirmal Gimenez MD

## 2025-02-28 LAB
ACHR BIND AB SER-SCNC: 0 NMOL/L (ref 0–0.4)
ACHR BLOCK AB/ACHR TOTAL SFR SER: 3 % (ref 0–26)
MISCELLANEOUS LAB TEST ORDER: NORMAL
WHOPPER PROMPT: NORMAL

## 2025-03-10 ENCOUNTER — HOSPITAL ENCOUNTER (OUTPATIENT)
Dept: WOMENS IMAGING | Age: 55
Discharge: HOME | End: 2025-03-12
Attending: SURGERY
Payer: OTHER GOVERNMENT

## 2025-03-10 ENCOUNTER — RESULTS FOLLOW-UP (OUTPATIENT)
Dept: WOMENS IMAGING | Age: 55
End: 2025-03-10

## 2025-03-10 DIAGNOSIS — Z12.31 ENCOUNTER FOR SCREENING MAMMOGRAM FOR BREAST CANCER: ICD-10-CM

## 2025-03-10 PROCEDURE — 77067 SCR MAMMO BI INCL CAD: CPT

## 2025-03-10 NOTE — RESULT ENCOUNTER NOTE
Mammogram and Breast Ultrasound were negative. Follow up as needed. Please see your physician for regular mammogram orders and breast exams YEARLY. Continue Self Breast Awareness. Recommend healthy diet and exercise. Recommend as needed follow up. Return to me if any new breast lump, discharge, or concerns.

## 2025-08-25 ENCOUNTER — OFFICE VISIT (OUTPATIENT)
Dept: FAMILY MEDICINE CLINIC | Age: 55
End: 2025-08-25
Payer: OTHER GOVERNMENT

## 2025-08-25 VITALS
HEIGHT: 67 IN | BODY MASS INDEX: 30.32 KG/M2 | SYSTOLIC BLOOD PRESSURE: 124 MMHG | DIASTOLIC BLOOD PRESSURE: 82 MMHG | WEIGHT: 193.2 LBS | HEART RATE: 61 BPM | OXYGEN SATURATION: 99 %

## 2025-08-25 DIAGNOSIS — E87.6 HYPOKALEMIA: ICD-10-CM

## 2025-08-25 DIAGNOSIS — F41.9 ANXIETY: ICD-10-CM

## 2025-08-25 DIAGNOSIS — G37.2 CENTRAL PONTINE MYELINOLYSIS (HCC): ICD-10-CM

## 2025-08-25 DIAGNOSIS — M62.838 MUSCLE SPASM: Primary | ICD-10-CM

## 2025-08-25 DIAGNOSIS — Z74.09 IMPAIRED MOBILITY AND ACTIVITIES OF DAILY LIVING: ICD-10-CM

## 2025-08-25 DIAGNOSIS — G37.2: ICD-10-CM

## 2025-08-25 DIAGNOSIS — Z78.9 IMPAIRED MOBILITY AND ACTIVITIES OF DAILY LIVING: ICD-10-CM

## 2025-08-25 DIAGNOSIS — I10 HYPERTENSION, UNSPECIFIED TYPE: ICD-10-CM

## 2025-08-25 PROCEDURE — 3074F SYST BP LT 130 MM HG: CPT | Performed by: FAMILY MEDICINE

## 2025-08-25 PROCEDURE — 99213 OFFICE O/P EST LOW 20 MIN: CPT | Performed by: FAMILY MEDICINE

## 2025-08-25 PROCEDURE — 3079F DIAST BP 80-89 MM HG: CPT | Performed by: FAMILY MEDICINE

## 2025-08-25 RX ORDER — CYCLOBENZAPRINE HCL 10 MG
10 TABLET ORAL NIGHTLY PRN
Qty: 30 TABLET | Refills: 5 | Status: SHIPPED | OUTPATIENT
Start: 2025-08-25

## 2025-08-25 SDOH — ECONOMIC STABILITY: FOOD INSECURITY: WITHIN THE PAST 12 MONTHS, THE FOOD YOU BOUGHT JUST DIDN'T LAST AND YOU DIDN'T HAVE MONEY TO GET MORE.: NEVER TRUE

## 2025-08-25 SDOH — ECONOMIC STABILITY: FOOD INSECURITY: WITHIN THE PAST 12 MONTHS, YOU WORRIED THAT YOUR FOOD WOULD RUN OUT BEFORE YOU GOT MONEY TO BUY MORE.: NEVER TRUE

## 2025-08-25 ASSESSMENT — PATIENT HEALTH QUESTIONNAIRE - PHQ9
SUM OF ALL RESPONSES TO PHQ QUESTIONS 1-9: 0
SUM OF ALL RESPONSES TO PHQ QUESTIONS 1-9: 0
2. FEELING DOWN, DEPRESSED OR HOPELESS: NOT AT ALL
1. LITTLE INTEREST OR PLEASURE IN DOING THINGS: NOT AT ALL
SUM OF ALL RESPONSES TO PHQ QUESTIONS 1-9: 0
SUM OF ALL RESPONSES TO PHQ QUESTIONS 1-9: 0

## 2025-08-26 ENCOUNTER — OFFICE VISIT (OUTPATIENT)
Age: 55
End: 2025-08-26
Payer: OTHER GOVERNMENT

## 2025-08-26 VITALS
HEART RATE: 65 BPM | DIASTOLIC BLOOD PRESSURE: 72 MMHG | WEIGHT: 193 LBS | BODY MASS INDEX: 30.23 KG/M2 | SYSTOLIC BLOOD PRESSURE: 108 MMHG

## 2025-08-26 DIAGNOSIS — G37.2 CENTRAL PONTINE MYELINOLYSIS (HCC): ICD-10-CM

## 2025-08-26 DIAGNOSIS — R26.0 ATAXIC GAIT: ICD-10-CM

## 2025-08-26 DIAGNOSIS — F48.2 PSEUDOBULBAR AFFECT: ICD-10-CM

## 2025-08-26 DIAGNOSIS — G37.2: Primary | ICD-10-CM

## 2025-08-26 DIAGNOSIS — G21.8 OTHER SECONDARY PARKINSONISM (HCC): ICD-10-CM

## 2025-08-26 DIAGNOSIS — F10.21 ALCOHOL DEPENDENCE IN REMISSION (HCC): ICD-10-CM

## 2025-08-26 DIAGNOSIS — G37.2: ICD-10-CM

## 2025-08-26 LAB — TSH SERPL-MCNC: 1.45 UIU/ML (ref 0.44–3.86)

## 2025-08-26 PROCEDURE — 3074F SYST BP LT 130 MM HG: CPT | Performed by: PSYCHIATRY & NEUROLOGY

## 2025-08-26 PROCEDURE — 3078F DIAST BP <80 MM HG: CPT | Performed by: PSYCHIATRY & NEUROLOGY

## 2025-08-26 PROCEDURE — 99214 OFFICE O/P EST MOD 30 MIN: CPT | Performed by: PSYCHIATRY & NEUROLOGY

## 2025-08-26 RX ORDER — DEXTROMETHORPHAN HYDROBROMIDE AND QUINIDINE SULFATE 20; 10 MG/1; MG/1
CAPSULE, GELATIN COATED ORAL
Qty: 60 CAPSULE | Refills: 3 | Status: SHIPPED | OUTPATIENT
Start: 2025-08-26

## 2025-08-26 ASSESSMENT — ENCOUNTER SYMPTOMS
PHOTOPHOBIA: 0
BACK PAIN: 0
TROUBLE SWALLOWING: 0
CHOKING: 0
VOMITING: 0
SHORTNESS OF BREATH: 0
COLOR CHANGE: 0
NAUSEA: 0

## 2025-08-27 LAB
CORTISOL COLLECTION INFO: NORMAL
CORTISOL: 8.9 UG/DL (ref 2.5–19.5)
VITAMIN B-12: 514 PG/ML (ref 232–1245)

## (undated) DEVICE — CONMED SCOPE SAVER BITE BLOCK, 20X27 MM: Brand: SCOPE SAVER

## (undated) DEVICE — GLOVE ORANGE PI 8   MSG9080

## (undated) DEVICE — SINGLE PORT MANIFOLD: Brand: NEPTUNE 2

## (undated) DEVICE — BRUSH ENDO COMBO

## (undated) DEVICE — TUBING, SUCTION, 1/4" X 10', STRAIGHT: Brand: MEDLINE

## (undated) DEVICE — Device: Brand: ENDO SMARTCAP

## (undated) DEVICE — GLOVE ORTHO 8   MSG9480

## (undated) DEVICE — TUBE SET 96 MM 64 MM H2O PERISTALTIC STD AUX CHANNEL

## (undated) DEVICE — ENDO CARRY-ON PROCEDURE KIT: Brand: ENDO CARRY-ON PROCEDURE KIT